# Patient Record
Sex: MALE | Race: WHITE | Employment: OTHER | URBAN - NONMETROPOLITAN AREA
[De-identification: names, ages, dates, MRNs, and addresses within clinical notes are randomized per-mention and may not be internally consistent; named-entity substitution may affect disease eponyms.]

---

## 2017-03-21 PROBLEM — I10 ESSENTIAL HYPERTENSION: Status: ACTIVE | Noted: 2017-03-21

## 2017-03-21 PROBLEM — Z79.01 CHRONIC ANTICOAGULATION: Status: ACTIVE | Noted: 2017-03-21

## 2018-08-02 PROBLEM — Z87.891 HISTORY OF TOBACCO ABUSE: Status: ACTIVE | Noted: 2018-08-02

## 2018-08-02 PROBLEM — G62.9 PERIPHERAL NEUROPATHY: Status: ACTIVE | Noted: 2018-08-02

## 2018-08-02 PROBLEM — G47.33 OSA (OBSTRUCTIVE SLEEP APNEA): Status: ACTIVE | Noted: 2018-08-02

## 2021-01-07 PROBLEM — Z86.0101 HISTORY OF ADENOMATOUS POLYP OF COLON: Status: ACTIVE | Noted: 2021-01-07

## 2022-01-27 PROBLEM — M25.562 CHRONIC PAIN OF LEFT KNEE: Status: ACTIVE | Noted: 2022-01-27

## 2022-01-27 PROBLEM — G89.29 CHRONIC PAIN OF LEFT KNEE: Status: ACTIVE | Noted: 2022-01-27

## 2022-01-27 PROBLEM — M25.462 EFFUSION OF LEFT KNEE JOINT: Status: ACTIVE | Noted: 2022-01-27

## 2022-05-20 PROBLEM — M17.12 PRIMARY OSTEOARTHRITIS OF LEFT KNEE: Status: ACTIVE | Noted: 2022-05-18

## 2022-08-05 PROBLEM — U07.1 COVID-19: Status: ACTIVE | Noted: 2022-08-05

## 2023-02-14 PROBLEM — I10 ESSENTIAL HYPERTENSION: Status: ACTIVE | Noted: 2023-02-14

## 2023-02-14 PROBLEM — R73.9 HYPERGLYCEMIA: Status: ACTIVE | Noted: 2023-02-14

## 2023-02-14 PROBLEM — Z79.01 ANTICOAGULATED ON COUMADIN: Status: ACTIVE | Noted: 2023-02-14

## 2023-03-08 ENCOUNTER — TELEPHONE (OUTPATIENT)
Dept: CARDIOLOGY CLINIC | Age: 83
End: 2023-03-08

## 2023-03-09 ENCOUNTER — OFFICE VISIT (OUTPATIENT)
Dept: CARDIOLOGY CLINIC | Age: 83
End: 2023-03-09
Payer: MEDICARE

## 2023-03-09 VITALS
HEIGHT: 71 IN | DIASTOLIC BLOOD PRESSURE: 82 MMHG | SYSTOLIC BLOOD PRESSURE: 128 MMHG | HEART RATE: 68 BPM | WEIGHT: 186 LBS | BODY MASS INDEX: 26.04 KG/M2

## 2023-03-09 DIAGNOSIS — G47.33 OSA (OBSTRUCTIVE SLEEP APNEA): ICD-10-CM

## 2023-03-09 DIAGNOSIS — I48.20 CHRONIC ATRIAL FIBRILLATION (HCC): Primary | ICD-10-CM

## 2023-03-09 DIAGNOSIS — I50.42 CHRONIC COMBINED SYSTOLIC AND DIASTOLIC HEART FAILURE (HCC): ICD-10-CM

## 2023-03-09 DIAGNOSIS — I35.1 MILD AORTIC INSUFFICIENCY: ICD-10-CM

## 2023-03-09 DIAGNOSIS — I05.1 RHEUMATIC MITRAL REGURGITATION: ICD-10-CM

## 2023-03-09 DIAGNOSIS — I10 PRIMARY HYPERTENSION: ICD-10-CM

## 2023-03-09 PROCEDURE — 3074F SYST BP LT 130 MM HG: CPT | Performed by: CLINICAL NURSE SPECIALIST

## 2023-03-09 PROCEDURE — 3079F DIAST BP 80-89 MM HG: CPT | Performed by: CLINICAL NURSE SPECIALIST

## 2023-03-09 PROCEDURE — 99213 OFFICE O/P EST LOW 20 MIN: CPT | Performed by: CLINICAL NURSE SPECIALIST

## 2023-03-09 PROCEDURE — 1123F ACP DISCUSS/DSCN MKR DOCD: CPT | Performed by: CLINICAL NURSE SPECIALIST

## 2023-03-09 ASSESSMENT — ENCOUNTER SYMPTOMS
VOMITING: 0
SHORTNESS OF BREATH: 1
CHEST TIGHTNESS: 0
ABDOMINAL PAIN: 0
COUGH: 0
NAUSEA: 0
FACIAL SWELLING: 0
WHEEZING: 0
EYE REDNESS: 0

## 2023-03-09 NOTE — PROGRESS NOTES
[facility], [address], [address]  [address]  Phone: [phone]  Fax: [phone]    OFFICE VISIT:  3/9/2023    Miguel Cutler - : 1940    Reason For Visit:  Cecily Rizzo is a 79 y.o. male who is here for 6 Month Follow-Up and Atrial Fibrillation       Diagnosis Orders   1. Chronic atrial fibrillation ([address]        2. Chronic combined systolic and diastolic heart failure ([address]        3. Primary hypertension        4. Rheumatic mitral regurgitation        5. Mild aortic insufficiency        6. SADE (obstructive sleep apnea)                HPI  Pt with a history of dyslipidemia, hypertension, sleep apnea, past tobacco abuse/COPD, chronic atrial fibrillation/Coumadin therapy, systolic heart failure, mitral regurgitation considered to be 2+ via DENA on 2022, and aortic insufficiency      He has some dyspnea that is chronic, but has recently been treated for pneumonia within the past few weeks and is still recovering. No complaints of chest pain, orthopnea, PND, edema, palpitations, or sudden weight gain       Aleida Olivas MD is PCP.   Miguel Cutler has the following history as recorded in [facility]:    Patient Active Problem List    Diagnosis Date Noted    COVID-19 2022    Postoperative anemia 2022    Primary osteoarthritis of left knee 2022    Chronic pain of left knee 2022    Osteoarthritis of left knee 2022    Tendinosis of left knee 2022    Effusion of left knee joint 2022    SADE (obstructive sleep apnea) 2018    Peripheral neuropathy 2018    PUD (peptic ulcer disease) 2018    BPH (benign prostatic hyperplasia) 2018    History of tobacco abuse 2018    Mild aortic insufficiency 2017    Mild mitral regurgitation by prior echocardiogram 2017    Essential hypertension 2017    Chronic anticoagulation 2017    Rheumatic fever     Chronic atrial fibrillation ([address] COPD (chronic obstructive pulmonary disease) (HCC)      Past Medical History:   Diagnosis Date    Arthritis     Bladder cancer ([facility]     BCG tx    Chronic atrial fibrillation (HCC)     Chronic cough     COPD (chronic obstructive pulmonary disease) (HCC)     asbestoes exposure    COVID-19 2022    Fatigue     Hearing deficit     Hyperlipidemia     Hypertension     MVP (mitral valve prolapse)     Osteoarthritis     Pneumonia     Prostatic hypertrophy     Rheumatic fever     Sleep apnea     CPAP    Tobacco use      Past Surgical History:   Procedure Laterality Date    APPENDECTOMY      BLADDER SURGERY      for cancer    MO EXC LESION EYELID W/O CLSR/W/SIMPLE DIR CLOSURE Bilateral 2018    BLEPHAROPLASTY BILATERAL performed by Yanelis Ernandez MD at [address] Left 2022    LEFT KNEE TOTAL ARTHROPLASTY performed by Sean Rosado MD at [facility]       Family History   Problem Relation Age of Onset    Heart Disease Mother      Social History     Tobacco Use    Smoking status: Former     Packs/day: 1.00     Years: 35.00     Pack years: 35.00     Types: Cigarettes     Quit date: 1985     Years since quittin.8    Smokeless tobacco: Never   Substance Use Topics    Alcohol use: Yes     Comment: 3-4 Beers a week      Current Outpatient Medications   Medication Sig Dispense Refill    fluticasone-salmeterol (ADVAIR DISKUS) 250-50 MCG/ACT AEPB diskus inhaler Inhale 1 puff into the lungs in the morning and 1 puff in the evening.  60 each 3    lisinopril (PRINIVIL;ZESTRIL) 10 MG tablet Take 1 tablet by mouth daily 90 tablet 3    ondansetron (ZOFRAN) 4 MG tablet Take 1 tablet by mouth every 8 hours as needed for Nausea or Vomiting 30 tablet 0    tamsulosin (FLOMAX) 0.4 MG capsule Take 0.4 mg by mouth 2 times daily       aspirin 81 MG EC tablet Take 81 mg by mouth daily      Apoaequorin (PREVAGEN EXTRA STRENGTH) 20 MG CAPS Take 20 mg by mouth daily gabapentin (NEURONTIN) 300 MG capsule Take 2 capsules by mouth nightly for 3 days. 6 capsule 0    esomeprazole (NEXIUM) 40 MG delayed release capsule TAKE 1 CAPSULE BY MOUTH EVERY MORNING BEFORE BREAKFAST (Patient taking differently: Take 40 mg by mouth every morning (before breakfast)) 90 capsule 1    warfarin (COUMADIN) 6 MG tablet Take 1 tablet by mouth daily (Patient taking differently: Take 6 mg by mouth daily EXCEPT ON WEDNESDAY'S, HE TAKES 1 1/2 TABLETS FOR A TOTAL OF 9 MG) 90 tablet 1    Misc Natural Products (OSTEO BI-FLEX JOINT SHIELD PO) Take 1 tablet by mouth daily       Multiple Vitamins-Minerals (CENTRUM SILVER) TABS Take 1 tablet by mouth daily       Ascorbic Acid (VITAMIN C) 500 MG tablet Take 500 mg by mouth daily. No current facility-administered medications for this visit. Allergies: Ciprofloxacin    Review of Systems  Review of Systems   Constitutional:  Negative for activity change, diaphoresis, fatigue, fever and unexpected weight change. HENT:  Negative for facial swelling and nosebleeds. Complains of hearing loss   Eyes:  Negative for redness and visual disturbance. Respiratory:  Positive for shortness of breath (Recent pneumonia). Negative for cough, chest tightness and wheezing. Cardiovascular:  Negative for chest pain, palpitations and leg swelling. Gastrointestinal:  Negative for abdominal pain, nausea and vomiting. Endocrine: Negative for cold intolerance and heat intolerance. Genitourinary:  Negative for dysuria and hematuria. Musculoskeletal:  Negative for arthralgias and myalgias. C/o  knee pain, improving since surgery   Skin:  Negative for pallor and rash. Neurological:  Negative for dizziness, seizures, syncope, weakness and light-headedness. Hematological:  Does not bruise/bleed easily. Psychiatric/Behavioral:  Negative for agitation. The patient is not nervous/anxious.       Objective  Vital Signs - /82   Pulse 68   Ht 5' 11\" (1.803 m)   Wt 186 lb (84.4 kg)   BMI 25.94 kg/m²   Physical Exam  Vitals and nursing note reviewed. Constitutional:       General: He is not in acute distress. Appearance: He is well-developed. He is not diaphoretic. HENT:      Head: Normocephalic and atraumatic. Right Ear: Hearing and external ear normal.      Left Ear: Hearing and external ear normal.      Ears:      Comments: Hard of hearing     Nose: Nose normal.   Eyes:      General:         Right eye: No discharge. Left eye: No discharge. Pupils: Pupils are equal, round, and reactive to light. Neck:      Thyroid: No thyromegaly. Vascular: No carotid bruit or JVD. Trachea: No tracheal deviation. Cardiovascular:      Rate and Rhythm: Normal rate. Rhythm irregular. Heart sounds: Murmur (2/6 systolic) heard. No friction rub. No gallop. Pulmonary:      Effort: Pulmonary effort is normal. No respiratory distress. Breath sounds: Rales (left base) present. No wheezing. Abdominal:      Palpations: Abdomen is soft. Tenderness: There is no abdominal tenderness. Musculoskeletal:         General: No swelling or deformity. Cervical back: Neck supple. No muscular tenderness. Right lower leg: No edema. Left lower leg: No edema. Comments: Abnormal gait due to knee pain   Skin:     General: Skin is warm and dry. Findings: No rash. Comments: Left knee incision well-healed without sign of infection   Neurological:      General: No focal deficit present. Mental Status: He is alert and oriented to person, place, and time. Cranial Nerves: No cranial nerve deficit.    Psychiatric:         Mood and Affect: Mood normal.         Behavior: Behavior normal.         Judgment: Judgment normal.       Data:  Lab Results   Component Value Date/Time    WBC 9.5 05/02/2022 01:40 PM    RBC 5.24 05/02/2022 01:40 PM    HGB 11.5 05/20/2022 02:53 AM    HCT 35.0 05/20/2022 02:53 AM     05/02/2022 01:40 PM      No results found for: CHOL, TRIG, HDL, LDLCALC  Lab Results   Component Value Date/Time     05/20/2022 02:53 AM    K 4.3 05/20/2022 02:53 AM     05/20/2022 02:53 AM    CO2 25 05/20/2022 02:53 AM    GLUCOSE 119 05/20/2022 02:53 AM    BUN 16 05/20/2022 02:53 AM    CREATININE 1.1 05/20/2022 02:53 AM    CALCIUM 8.5 05/20/2022 02:53 AM    ALT 25 05/02/2022 01:40 PM    AST 20 05/02/2022 01:40 PM     Lab Results   Component Value Date/Time    TSH 1.091 11/02/2011 09:07 AM     Transesophageal echocardiogram preliminary report 5/12/2022:     Mitral regurgitation appears mild to moderate (2+ at worst). Eccentric posteriorly directed jet with no reversal or blunting of flow in 3 pulmonary veins. EF 45 to 50% with mild to moderate left ventricular enlargement. Massive left atrial enlargement with no thrombus in LA or AROLDO. Intact interatrial septum. Moderate to severe right atrial enlargement. Mild RVE. Plan: Proceed with intermediate risk orthopedic surgery as clinically indicated. Can be monitored going forward. Medical management. Estee 3//30/22  Impression:   There is no obvious infarct or ischemia, with a calculated ejection   fraction of 51 % moderate left ventricular dilatation. Suggest: Clinical correlation is advised. Signed by Dr Bee Shoulder     Echo 3/30/22  Conclusions      Summary   LV is moderate to severely dilated with mild to moderately reduced LV   systolic function. LV ejection fraction estimated at 40 to 45%. Probable grade 3 diastolic dysfunction (rhythm is atrial fibrillation)   RV is mildly dilated with preserved RV systolic function. Massive left atrial enlargement. Moderate to severe right atrial enlargement. Aortic valve is trileaflet with mild leaflet thickening but preserved   leaflet mobility. No stenosis. Mild aortic regurgitation. Mitral valve is mildly thickened with mildly reduced leaflet mobility.    Moderate to severe eccentric posteriorly directed mitral regurgitation. No   stenosis.   Mild tricuspid regurgitation.   RVSP estimated at 30 mmHg.   No significant pericardial effusion.      Signature      ----------------------------------------------------------------   Electronically signed by Salvador Hemphill MD(Interpreting physician)   on 04/25/2022 10:33 PM   ----------------------------------------------------------------    Assessment:     Diagnosis Orders   1. Chronic atrial fibrillation (HCC)        2. Chronic combined systolic and diastolic heart failure (HCC)        3. Primary hypertension        4. Rheumatic mitral regurgitation        5. Mild aortic insufficiency        6. SADE (obstructive sleep apnea)               Chronic atrial fibrillation-rate controlled and anticoagulated with warfarin.  Patient does home INR monitoring, last INR 1.7 and coumadin adjusted.  Recent echo shows severe bilateral atrial enlargement.  No bleeding or falling issues    Chronic combined systolic and diastolic heart failure-EF 45 to 50% per recent DENA.  He appears euvolemic on current regimen with lisinopril.  Instructed to monitor weight, report sudden weight gain, low-sodium diet.  Currently not on a beta-blocker with heart rate of 68 in the office today.  Consideration for beta-blocker in the future.  Discussed monitoring weight, reporting sudden weight gain, low-sodium diet    Hypertension-stable on current regimen with lisinopril    Rheumatic mitral regurgitation- 2+ regurgitation at most per DENA.  Stable, continue to monitor    Aortic regurgitation-mild per recent echo, stable, continue to monitor    Obstructive sleep apnea-stable and wearing sleep equipment regularly    Plan    Return in about 6 months (around 9/9/2023) for APRN.     - Weigh daily every morning after urinating (this is your dry weight).   Report weight gain of 3lbs or more in 24hrs or 5lbs in one week.  - Call for increasing shortness of breath or increasing swelling in feet and  legs.    (This could mean you are retaining too much fluid)  - 2000mg low sodium diet  - Fluid restriction of 1500ml per day (about 6-8 cups (48-64oz) of fluid per day)     Call with any questionsor concerns  Follow up with Ana Milligan MD for non cardiac problems  Report any new problems  Cardiovascular Fitness-Exercise as tolerated. Strive for 15 minutes of exercise most days of the week. Cardiac / HealthyDiet  Continue current medications as directed  Continue plan of treatment  It is always recommended that you bring your medicationsbottles with you to each visit - this is for your safety!        Winsome Ames, CHRISTOPHER

## 2023-03-09 NOTE — PATIENT INSTRUCTIONS
Weigh daily every morning after urinating (this is your dry weight). Report weight gain of 3lbs or more in 24hrs or 5lbs in one week. - Call for increasing shortness of breath or increasing swelling in feet and legs.     (This could mean you are retaining too much fluid)  - 2000mg low sodium diet  - Fluid restriction of 1500ml per day (about 6-8 cups (48-64oz) of fluid per day)

## 2023-03-20 ENCOUNTER — TELEPHONE (OUTPATIENT)
Dept: CARDIOLOGY CLINIC | Age: 83
End: 2023-03-20

## 2023-03-20 NOTE — TELEPHONE ENCOUNTER
Please review patient's recent echo done at [facility] that shows a decrease in EF to 35 to 40%, previously 40 to 45% per TTE and 45 to 50% per T EE last year (to eval MR). Negative Estee scan last year    He is not symptomatic. He is not on a beta-blocker. We will get this restarted. Please advise if you recommend anything else with the decline in his LVEF.     Thank you

## 2023-03-21 NOTE — TELEPHONE ENCOUNTER
Please see Dr. Sheela Marks comments below.   Can you please get a CD with echo images from [facility] on this patient per Dr. Sheela Marks request.

## 2023-03-27 PROBLEM — G47.33 OBSTRUCTIVE SLEEP APNEA: Chronic | Status: ACTIVE | Noted: 2023-03-27

## 2023-03-27 PROBLEM — J96.11 CHRONIC RESPIRATORY FAILURE WITH HYPOXIA: Chronic | Status: ACTIVE | Noted: 2023-03-27

## 2023-03-27 PROBLEM — J96.11 CHRONIC RESPIRATORY FAILURE WITH HYPOXIA: Status: ACTIVE | Noted: 2023-03-27

## 2023-03-27 PROBLEM — R91.8 GROUND GLASS OPACITY PRESENT ON IMAGING OF LUNG: Status: ACTIVE | Noted: 2023-03-27

## 2023-03-27 PROBLEM — J47.9 BRONCHIECTASIS WITHOUT COMPLICATION: Status: ACTIVE | Noted: 2023-03-27

## 2023-03-27 PROBLEM — J47.9 BRONCHIECTASIS WITHOUT COMPLICATION: Chronic | Status: ACTIVE | Noted: 2023-03-27

## 2023-03-27 PROBLEM — J43.8 OTHER EMPHYSEMA: Status: ACTIVE | Noted: 2023-03-27

## 2023-03-27 PROBLEM — R09.02 HYPOXIA: Status: RESOLVED | Noted: 2023-02-11 | Resolved: 2023-03-27

## 2023-03-27 PROBLEM — J30.9 ALLERGIC RHINITIS: Status: ACTIVE | Noted: 2023-03-27

## 2023-03-27 PROBLEM — J30.9 ALLERGIC RHINITIS: Chronic | Status: ACTIVE | Noted: 2023-03-27

## 2023-03-27 PROBLEM — J43.8 OTHER EMPHYSEMA: Chronic | Status: ACTIVE | Noted: 2023-03-27

## 2023-03-27 PROBLEM — K21.9 GASTROESOPHAGEAL REFLUX DISEASE: Chronic | Status: ACTIVE | Noted: 2023-03-27

## 2023-03-27 PROBLEM — R59.9 REACTIVE LYMPHADENOPATHY: Status: ACTIVE | Noted: 2023-03-27

## 2023-03-27 PROBLEM — K21.9 GASTROESOPHAGEAL REFLUX DISEASE: Status: ACTIVE | Noted: 2023-03-27

## 2023-04-19 ENCOUNTER — TELEPHONE (OUTPATIENT)
Dept: CARDIOLOGY CLINIC | Age: 83
End: 2023-04-19

## 2023-04-19 NOTE — TELEPHONE ENCOUNTER
Patient missed his appointments to discuss echo findings and recommendations from Dr. Shaun Andersen. Dr Shaun Andersen recommended starting additional heart failure medications and to a further evaluate mitral valve with a DENA and possibly right left heart cath. I wanted to discuss this with the patient. Please have him either reschedule his appointment or make a telephone appointment. If he is going elsewhere for cardiology, that is fine, but want to be sure that this gets taken care of    .

## 2023-04-19 NOTE — TELEPHONE ENCOUNTER
Reached out to patient just to check and make sure everything is okay. He said that he feels his granddaughter is having him see another doctor but in unsure where. He is going to call us back and let us know if he is staying with this office or if he is switching offices.

## 2023-04-21 NOTE — TELEPHONE ENCOUNTER
Spoke with Giancarlo Trivedi patients granddaughter, she would like you to call her and discuss medication changes since patient gets very confused. He was under the intention that Dr. Maik Sandoval was retiring and they were going to proceed to [facility] if that was the issue. Her number is in the chart under other contact. Please let me know if I can help in any way.

## 2023-04-24 NOTE — PROGRESS NOTES
Spoke with patient's granddaughter, Roberto Morel who helps with his care. Discussed Dr. Gilford Hawk recommendations regarding patient's heart failure and mitral valve. We will start Entresto after a 36-hour washout and discontinue lisinopril. Follow-up in [address] [place] [address] office in a few weeks to see how he is doing.   Consideration for beta-blocker and spironolactone in future    Reyna-please see if we can work this patient in for follow-up in the next 3 to 5 weeks

## 2023-05-01 ENCOUNTER — CARE COORDINATION (OUTPATIENT)
Dept: CARE COORDINATION | Age: 83
End: 2023-05-01

## 2023-05-01 NOTE — CARE COORDINATION
ACC: Joshua Hernandez, RN    Ambulatory Care Nurse contacted the patient via telephone to perform admission intake assessment for ambulatory care management. ACM Verified name and  with family as identifiers. Provided introduction to self, and explanation of the ACM role. Patient reports he is moving providers to [address] [place] [address] were his Granddaughter is employed. ACM verified follow-up appointment on tomorrow at [address] [facility]. Patient requested assistance with canceling appointment on 23 with [facility] ([facility]). ACM verbalized understanding encouraging patient to reach out for future needs Patient verbalized understanding.        Berkley Hernández [address], [address] [place] [address]

## 2023-05-07 NOTE — PROGRESS NOTES
" Ilene Oliveros, MSN, APRN, NP-C, RONNIE, CFRN, EMT-B  [facility]  [address]            [place] [address]  Phone: [phone]  Fax: [phone]          Chief Complaint  Pneumonia    Subjective    History of Present Illness  Riley Foote presents to [facility]   History of Present Illness   The patient presents today for f/u of PNA and bronchiectasis. He is accompanied by his granddaughter.  The patient is using Trelegy, albuterol HFA/neb as needed.  The patient states that he is feeling much better since hospitalization in February.  He states that he played 18 holes of golf today.  No increasing shortness of breath, no fever, no chills, no cough with purulent sputum.    Studies from last OV 3/27/23:   CT of the chest is pending 6/12/23 to follow-up on pneumonia, groundglass opacities, and reactive lymphadenopathy.  3/27/23 alpha 1 MM  3/27/23 CBC + differential with noted metamyelocyte and thrombocytopenia, elevated IgM, decreased IgG subclass 2, CMP with mild hyperglycemia  3/27/23 IgA, IgE, IgG, IgG subclass 1, IgG subclass 3, IgG subclass 4 within normal limits  Compliance report was unable to be obtained as his PAP device is outdated.   Overnight 3/1/23 was reported to be completed on RA and showed 4.6 minutes of time less than 88%, 170 desaturations, AMBROSE 24. (the patient reports that he was wearing his PAP+2L when ON was completed).  No other aggravating or alleviating factors.      Objective   Vital Signs:   /72   Pulse 66   Ht 177.8 cm (70\")   Wt 83.5 kg (184 lb)   SpO2 97%   BMI 26.40 kg/m²     Physical Exam  Vitals reviewed.   Constitutional:       General: He is not in acute distress.     Appearance: He is well-developed and overweight.   HENT:      Head: Normocephalic and atraumatic.   Eyes:      General: No scleral icterus.     Conjunctiva/sclera: Conjunctivae normal.      Pupils: Pupils are equal, round, and " reactive to light.   Cardiovascular:      Rate and Rhythm: Normal rate.   Pulmonary:      Effort: Pulmonary effort is normal. No respiratory distress.      Breath sounds: Normal breath sounds. No wheezing or rales.   Musculoskeletal:         General: Normal range of motion.      Cervical back: Normal range of motion and neck supple.   Skin:     General: Skin is warm and dry.   Neurological:      Mental Status: He is alert and oriented to person, place, and time.   Psychiatric:         Behavior: Behavior normal.         Thought Content: Thought content normal.         Judgment: Judgment normal.        Result Review :  The following data was reviewed by: OLEG Bermeo on 05/08/2023:    Rest/Exercise Pulse Ox Values        3/27/2023    13:30   Rest/Exercise Pulse Ox Results   Rest room air SAT % 98   Exercise room air SAT % 98         Results for orders placed during the hospital encounter of 04/11/23    Pulmonary Function Test    [facility] - Pulmonary Function Test    [address] [facility]  [place]  [address]  [phone]    Patient : Riley Foote  MRN : [id number]  CSN : [id number]  Pulmonologist : Antoine Mayes MD  Date : 4/11/2023    ______________________________________________________________________    Interpretation :  1.  Spirometry is within normal limits and in fact midflows are supranormal.  2.  Lung volumes reveal a decrease in expiratory reserve volume with a coexisting increase in inspiratory capacity and otherwise are within normal limits.  3.  There is a mild diffusion impairment even when corrected for alveolar volume.      Antoine Mayes MD           Assessment and Plan  Diagnoses and all orders for this visit:    1. Pneumonia due to infectious organism, unspecified laterality, unspecified part of lung (Primary)  Overview:  CT chest 2/15/23 - Persistent patchy left lower lobe opacities with new scattered groundglass opacities with bilateral upper and  right lower lobes worrisome for multifocal pneumonia. Similar bronchiectasis with peribronchial thickening. Reactive appearing mediastinal and possibly left hilar lymph node nodes.    CXR 3/9/23 - Previously identified bibasilar pulmonary infiltrates (left greater than right) are much improved on today's exam. No new or worsening infiltrate.    Assessment & Plan:  3/27/23 CBC + differential with noted metamyelocyte and thrombocytopenia-obtain f/u CBC+diff    F/u CT chest pending     Orders:  -     CBC & Differential; Future    2. High total serum IgM  -     VICK,PE and FLC, Serum; Future    3. Chronic respiratory failure with hypoxia  Assessment & Plan:  Overnight 3/1/23 was reported to be completed on RA and showed 4.6 minutes of time less than 88%, 170 desaturations, AMBROSE 24. (the patient reports that he was wearing his PAP+2L when ON was completed).    The patient is in need of a new NIPPV as his is >5 years old. His granddaughter is concerned that he need a Trilogy device.     Obtain f/u ON pulse on current PAP and O2 liter flow. Obtain AM ABG.   Unable to obtain compliance 2' current machine is outdated.     Orders:  -     Overnight Sleep Oximetry Study; Future  -     Blood Gas, Arterial With Co-Ox; Future    4. Bronchiectasis without complication  Comments:  Continue bronchodilators  Overview:  CT chest 2/15/23 - Similar bronchiectasis with peribronchial thickening.    3/27/23 elevated IgM, decreased IgG subclass 2  3/27/23 IgA, IgE, IgG, IgG subclass 1, IgG subclass 3, IgG subclass 4 within normal limits      5. Ground glass opacity present on imaging of lung  Overview:  CT chest 2/15/23 - Persistent patchy left lower lobe opacities with new scattered groundglass opacities with bilateral upper and right lower lobes worrisome for multifocal pneumonia.    Assessment & Plan:  F/u CT chest pending       6. Obstructive sleep apnea  Assessment & Plan:  Overnight 3/1/23 was reported to be completed on RA and showed  4.6 minutes of time less than 88%, 170 desaturations, AMBROSE 24. (the patient reports that he was wearing his PAP+2L when ON was completed).    The patient is in need of a new NIPPV as his is >5 years old. His granddaughter is concerned that he need a Trilogy device.     Obtain f/u ON pulse on current PAP and O2 liter flow. Obtain AM ABG.   Unable to obtain compliance 2' current machine is outdated.         7. Gastroesophageal reflux disease, unspecified whether esophagitis present  Comments:  Continue current treatment regimen.  Overview:  Nexium       8. Reactive lymphadenopathy  Comments:  f/u CT pending   Overview:  CT chest 2/15/23 - Reactive appearing mediastinal and possibly left hilar lymph node nodes.      9. Other emphysema  Overview:  3/27/23 alpha 1 MM    Trelegy-100, albuterol HFA/neb    Assessment & Plan:  Continue current treatment regimen.        Orders:  -     albuterol sulfate  (90 Base) MCG/ACT inhaler; Inhale 2 puffs Every 4 (Four) Hours As Needed for Wheezing or Shortness of Air for up to 30 days.  Dispense: 18 g; Refill: 11      Follow Up  OLEG Bermeo  5/9/2023  16:00 CDT    Return for keep upcoming appt, CT.    Patient was given instructions and counseling regarding his condition or for health maintenance advice. Please see specific information pulled into the AVS if appropriate.     Please note that portions of this note were completed with a voice recognition program.

## 2023-05-08 ENCOUNTER — OFFICE VISIT (OUTPATIENT)
Dept: PULMONOLOGY | Facility: CLINIC | Age: 83
End: 2023-05-08
Payer: MEDICARE

## 2023-05-08 VITALS
OXYGEN SATURATION: 97 % | HEIGHT: 70 IN | WEIGHT: 184 LBS | DIASTOLIC BLOOD PRESSURE: 72 MMHG | BODY MASS INDEX: 26.34 KG/M2 | SYSTOLIC BLOOD PRESSURE: 124 MMHG | HEART RATE: 66 BPM

## 2023-05-08 DIAGNOSIS — R76.8 HIGH TOTAL SERUM IGM: ICD-10-CM

## 2023-05-08 DIAGNOSIS — J47.9 BRONCHIECTASIS WITHOUT COMPLICATION: Chronic | ICD-10-CM

## 2023-05-08 DIAGNOSIS — K21.9 GASTROESOPHAGEAL REFLUX DISEASE, UNSPECIFIED WHETHER ESOPHAGITIS PRESENT: Chronic | ICD-10-CM

## 2023-05-08 DIAGNOSIS — G47.33 OBSTRUCTIVE SLEEP APNEA: Chronic | ICD-10-CM

## 2023-05-08 DIAGNOSIS — R91.8 GROUND GLASS OPACITY PRESENT ON IMAGING OF LUNG: ICD-10-CM

## 2023-05-08 DIAGNOSIS — J43.8 OTHER EMPHYSEMA: Chronic | ICD-10-CM

## 2023-05-08 DIAGNOSIS — R59.9 REACTIVE LYMPHADENOPATHY: ICD-10-CM

## 2023-05-08 DIAGNOSIS — J96.11 CHRONIC RESPIRATORY FAILURE WITH HYPOXIA: ICD-10-CM

## 2023-05-08 DIAGNOSIS — J18.9 PNEUMONIA DUE TO INFECTIOUS ORGANISM, UNSPECIFIED LATERALITY, UNSPECIFIED PART OF LUNG: Primary | ICD-10-CM

## 2023-05-08 PROCEDURE — 1159F MED LIST DOCD IN RCRD: CPT | Performed by: NURSE PRACTITIONER

## 2023-05-08 PROCEDURE — 3078F DIAST BP <80 MM HG: CPT | Performed by: NURSE PRACTITIONER

## 2023-05-08 PROCEDURE — 1160F RVW MEDS BY RX/DR IN RCRD: CPT | Performed by: NURSE PRACTITIONER

## 2023-05-08 PROCEDURE — 3074F SYST BP LT 130 MM HG: CPT | Performed by: NURSE PRACTITIONER

## 2023-05-08 PROCEDURE — 99214 OFFICE O/P EST MOD 30 MIN: CPT | Performed by: NURSE PRACTITIONER

## 2023-05-08 RX ORDER — ALBUTEROL SULFATE 90 UG/1
2 AEROSOL, METERED RESPIRATORY (INHALATION) EVERY 4 HOURS PRN
Qty: 18 G | Refills: 11 | Status: SHIPPED | OUTPATIENT
Start: 2023-05-08 | End: 2023-06-07

## 2023-05-08 RX ORDER — ALBUTEROL SULFATE 2.5 MG/3ML
2.5 SOLUTION RESPIRATORY (INHALATION) EVERY 4 HOURS PRN
COMMUNITY

## 2023-05-09 NOTE — PATIENT INSTRUCTIONS
Bronchiectasis  Bronchiectasis is a condition in which the airways in the lungs (bronchi) are damaged and widened. The condition makes it hard for the lungs to get rid of mucus, and it causes mucus to gather in the bronchi. This condition often leads to lung infections, which can make the condition worse.  What are the causes?  You can be born with this condition, or you can develop it later in life. Common causes of this condition include:  Cystic fibrosis.  Repeated lung infections, such as pneumonia or tuberculosis.  An object or other blockage in the lungs.  Breathing in fluid, food, or other objects (aspiration).  A problem with the body's defense system (immune system) and lung structure that is present at birth (congenital).  Sometimes the cause is not known.  What are the signs or symptoms?  Common symptoms of this condition include:  A daily cough that brings up mucus and lasts for more than 3 weeks.  Lung infections that happen often.  Shortness of breath and wheezing.  Weakness and feeling tired (fatigue).  How is this diagnosed?  This condition is diagnosed with tests, such as:  Chest X-rays or CT scans. These are done to check for changes in the lungs.  Breathing tests. These are done to check how well your lungs are working.  A test of a sample of your saliva (sputum culture). This test is done to check for infection.  Blood tests and other tests. These are done to check for related diseases or causes.  How is this treated?  Treatment for this condition depends on the severity of the illness and its cause. Treatment may include:  Medicines that loosen mucus so it can be coughed up (mucolytics).  Medicines that relax the muscles of the bronchi (bronchodilators).  Antibiotic medicines to prevent or treat infection.  Physical therapy to help clear mucus from the lungs. Techniques may include:  Postural drainage. This is when you sit or lie in certain positions so that mucus can drain by gravity.  Chest  percussion. This involves tapping the chest or back with a cupped hand.  Chest vibration. For this therapy, a hand or special equipment vibrates your chest and back.  Surgery to remove the affected part of the lung. This may be done in severe cases.  Follow these instructions at home:  Medicines  Take over-the-counter and prescription medicines only as told by your health care provider.  If you were prescribed an antibiotic medicine, take it as told by your health care provider. Do not stop taking the antibiotic even if you start to feel better.  Avoid taking sedatives and antihistamines unless your health care provider tells you to take them. These medicines tend to thicken the mucus in the lungs.  Managing symptoms  Do breathing exercises or techniques to clear your lungs as told by your health care provider.  Consider using a cold steam vaporizer or humidifier in your room or home to help loosen secretions.  If you have a cough that gets worse at night, try sleeping in a semi-upright position.  General instructions  Get plenty of rest.  Drink enough fluid to keep your urine pale yellow.  Stay inside when pollution and ozone levels are high.  Stay up to date with vaccinations and immunizations.  Avoid cigarette smoke and other lung irritants.  Do not use any products that contain nicotine or tobacco. These products include cigarettes, chewing tobacco, and vaping devices, such as e-cigarettes. If you need help quitting, ask your health care provider.  Keep all follow-up visits. This is important.  Contact a health care provider if:  You cough up more sputum than before and the sputum is yellow or green in color.  You have a fever or chills.  You cannot control your cough and are losing sleep.  Get help right away if:  You cough up blood.  You have chest pain.  You have increasing shortness of breath.  You have pain that gets worse or is not controlled with medicines.  You have a fever and your symptoms suddenly get  worse.  These symptoms may be an emergency. Get help right away. Call 911.  Do not wait to see if the symptoms will go away.  Do not drive yourself to the hospital.  Summary  Bronchiectasis is a condition in which the airways in the lungs (bronchi) are damaged and widened. The condition makes it hard for the lungs to get rid of mucus, and it causes mucus to gather in the bronchi.  Treatment usually includes therapy to help clear mucus from the lungs.  Avoid cigarette smoke and other lung irritants.  Stay up to date with vaccinations and immunizations.  This information is not intended to replace advice given to you by your health care provider. Make sure you discuss any questions you have with your health care provider.  Document Revised: 08/31/2022 Document Reviewed: 07/19/2022  [employer] Patient Education © 2022 [employer]  Gastroesophageal Reflux Disease, Adult  Gastroesophageal reflux (JULIA) happens when acid from the stomach flows up into the tube that connects the mouth and the stomach (esophagus). Normally, food travels down the esophagus and stays in the stomach to be digested. With JULIA, food and stomach acid sometimes move back up into the esophagus.  You may have a disease called gastroesophageal reflux disease (GERD) if the reflux:  Happens often.  Causes frequent or very bad symptoms.  Causes problems such as damage to the esophagus.  When this happens, the esophagus becomes sore and swollen. Over time, GERD can make small holes (ulcers) in the lining of the esophagus.  What are the causes?  This condition is caused by a problem with the muscle between the esophagus and the stomach. When this muscle is weak or not normal, it does not close properly to keep food and acid from coming back up from the stomach.  The muscle can be weak because of:  Tobacco use.  Pregnancy.  Having a certain type of hernia (hiatal hernia).  Alcohol use.  Certain foods and drinks, such as coffee, chocolate, onions, and  peppermint.  What increases the risk?  Being overweight.  Having a disease that affects your connective tissue.  Taking NSAIDs, such a ibuprofen.  What are the signs or symptoms?  Heartburn.  Difficult or painful swallowing.  The feeling of having a lump in the throat.  A bitter taste in the mouth.  Bad breath.  Having a lot of saliva.  Having an upset or bloated stomach.  Burping.  Chest pain. Different conditions can cause chest pain. Make sure you see your doctor if you have chest pain.  Shortness of breath or wheezing.  A long-term cough or a cough at night.  Wearing away of the surface of teeth (tooth enamel).  Weight loss.  How is this treated?  Making changes to your diet.  Taking medicine.  Having surgery.  Treatment will depend on how bad your symptoms are.  Follow these instructions at home:  Eating and drinking    Follow a diet as told by your doctor. You may need to avoid foods and drinks such as:  Coffee and tea, with or without caffeine.  Drinks that contain alcohol.  Energy drinks and sports drinks.  Bubbly (carbonated) drinks or sodas.  Chocolate and cocoa.  Peppermint and mint flavorings.  Garlic and onions.  Horseradish.  Spicy and acidic foods. These include peppers, chili powder, moore powder, vinegar, hot sauces, and BBQ sauce.  Citrus fruit juices and citrus fruits, such as oranges, mary, and limes.  Tomato-based foods. These include red sauce, chili, salsa, and pizza with red sauce.  Fried and fatty foods. These include donuts, french fries, potato chips, and high-fat dressings.  High-fat meats. These include hot dogs, rib eye steak, sausage, ham, and yang.  High-fat dairy items, such as whole milk, butter, and cream cheese.  Eat small meals often. Avoid eating large meals.  Avoid drinking large amounts of liquid with your meals.  Avoid eating meals during the 2-3 hours before bedtime.  Avoid lying down right after you eat.  Do not exercise right after you eat.  Lifestyle    Do not smoke or  use any products that contain nicotine or tobacco. If you need help quitting, ask your doctor.  Try to lower your stress. If you need help doing this, ask your doctor.  If you are overweight, lose an amount of weight that is healthy for you. Ask your doctor about a safe weight loss goal.  General instructions  Pay attention to any changes in your symptoms.  Take over-the-counter and prescription medicines only as told by your doctor.  Do not take aspirin, ibuprofen, or other NSAIDs unless your doctor says it is okay.  Wear loose clothes. Do not wear anything tight around your waist.  Raise (elevate) the head of your bed about 6 inches (15 cm). You may need to use a wedge to do this.  Avoid bending over if this makes your symptoms worse.  Keep all follow-up visits.  Contact a doctor if:  You have new symptoms.  You lose weight and you do not know why.  You have trouble swallowing or it hurts to swallow.  You have wheezing or a cough that keeps happening.  You have a hoarse voice.  Your symptoms do not get better with treatment.  Get help right away if:  You have sudden pain in your arms, neck, jaw, teeth, or back.  You suddenly feel sweaty, dizzy, or light-headed.  You have chest pain or shortness of breath.  You vomit and the vomit is green, yellow, or black, or it looks like blood or coffee grounds.  You faint.  Your poop (stool) is red, bloody, or black.  You cannot swallow, drink, or eat.  These symptoms may represent a serious problem that is an emergency. Do not wait to see if the symptoms will go away. Get medical help right away. Call your local emergency services (911 in the [place] Do not drive yourself to the hospital.  Summary  If a person has gastroesophageal reflux disease (GERD), food and stomach acid move back up into the esophagus and cause symptoms or problems such as damage to the esophagus.  Treatment will depend on how bad your symptoms are.  Follow a diet as told by your doctor.  Take all  medicines only as told by your doctor.  This information is not intended to replace advice given to you by your health care provider. Make sure you discuss any questions you have with your health care provider.  Document Revised: 06/28/2021 Document Reviewed: 06/28/2021  [employer] Patient Education © 2022 [employer]     No

## 2023-05-09 NOTE — ASSESSMENT & PLAN NOTE
Overnight 3/1/23 was reported to be completed on RA and showed 4.6 minutes of time less than 88%, 170 desaturations, AMBROSE 24. (the patient reports that he was wearing his PAP+2L when ON was completed).    The patient is in need of a new NIPPV as his is >5 years old. His granddaughter is concerned that he need a Trilogy device.     Obtain f/u ON pulse on current PAP and O2 liter flow. Obtain AM ABG.   Unable to obtain compliance 2' current machine is outdated.

## 2023-05-09 NOTE — ASSESSMENT & PLAN NOTE
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

## 2023-05-12 ENCOUNTER — TRANSCRIBE ORDERS (OUTPATIENT)
Dept: ADMINISTRATIVE | Facility: HOSPITAL | Age: 83
End: 2023-05-12
Payer: MEDICARE

## 2023-05-12 ENCOUNTER — LAB (OUTPATIENT)
Dept: LAB | Facility: HOSPITAL | Age: 83
End: 2023-05-12
Payer: MEDICARE

## 2023-05-12 DIAGNOSIS — J18.9 PNEUMONIA DUE TO INFECTIOUS ORGANISM, UNSPECIFIED LATERALITY, UNSPECIFIED PART OF LUNG: ICD-10-CM

## 2023-05-12 DIAGNOSIS — R76.8 HIGH TOTAL SERUM IGM: ICD-10-CM

## 2023-05-12 DIAGNOSIS — R76.8 HIGH TOTAL SERUM IGM: Primary | ICD-10-CM

## 2023-05-12 DIAGNOSIS — J96.11 CHRONIC RESPIRATORY FAILURE WITH HYPOXIA: ICD-10-CM

## 2023-05-12 LAB
A-A DO2: 38.1 MMHG
ARTERIAL PATENCY WRIST A: ABNORMAL
ATMOSPHERIC PRESS: 750 MMHG
BASE EXCESS BLDA CALC-SCNC: 3 MMOL/L (ref 0–2)
BASOPHILS # BLD AUTO: 0.06 10*3/MM3 (ref 0–0.2)
BASOPHILS NFR BLD AUTO: 0.8 % (ref 0–1.5)
BDY SITE: ABNORMAL
BODY TEMPERATURE: 37 C
COHGB MFR BLD: 1.7 % (ref 0–5)
DEPRECATED RDW RBC AUTO: 44.3 FL (ref 37–54)
EOSINOPHIL # BLD AUTO: 0.28 10*3/MM3 (ref 0–0.4)
EOSINOPHIL NFR BLD AUTO: 3.6 % (ref 0.3–6.2)
ERYTHROCYTE [DISTWIDTH] IN BLOOD BY AUTOMATED COUNT: 12.9 % (ref 12.3–15.4)
HCO3 BLDA-SCNC: 27.2 MMOL/L (ref 20–26)
HCT VFR BLD AUTO: 45.6 % (ref 37.5–51)
HCT VFR BLD CALC: 43.4 % (ref 38–51)
HGB BLD-MCNC: 14.3 G/DL (ref 13–17.7)
HGB BLDA-MCNC: 14.1 G/DL (ref 14–18)
IMM GRANULOCYTES # BLD AUTO: 0.05 10*3/MM3 (ref 0–0.05)
IMM GRANULOCYTES NFR BLD AUTO: 0.6 % (ref 0–0.5)
LYMPHOCYTES # BLD AUTO: 1.32 10*3/MM3 (ref 0.7–3.1)
LYMPHOCYTES NFR BLD AUTO: 16.8 % (ref 19.6–45.3)
Lab: ABNORMAL
MCH RBC QN AUTO: 29.4 PG (ref 26.6–33)
MCHC RBC AUTO-ENTMCNC: 31.4 G/DL (ref 31.5–35.7)
MCV RBC AUTO: 93.6 FL (ref 79–97)
METHGB BLD QL: 0.5 % (ref 0–3)
MODALITY: ABNORMAL
MONOCYTES # BLD AUTO: 0.74 10*3/MM3 (ref 0.1–0.9)
MONOCYTES NFR BLD AUTO: 9.4 % (ref 5–12)
NEUTROPHILS NFR BLD AUTO: 5.41 10*3/MM3 (ref 1.7–7)
NEUTROPHILS NFR BLD AUTO: 68.8 % (ref 42.7–76)
NOTE: ABNORMAL
NRBC BLD AUTO-RTO: 0 /100 WBC (ref 0–0.2)
OXYHGB MFR BLDV: 92.6 % (ref 94–99)
PCO2 BLDA: 39.6 MM HG (ref 35–45)
PCO2 TEMP ADJ BLD: 39.6 MM HG (ref 35–45)
PH BLDA: 7.45 PH UNITS (ref 7.35–7.45)
PH, TEMP CORRECTED: 7.45 PH UNITS (ref 7.35–7.45)
PLATELET # BLD AUTO: 171 10*3/MM3 (ref 140–450)
PMV BLD AUTO: 9.8 FL (ref 6–12)
PO2 BLDA: 65 MM HG (ref 83–108)
PO2 TEMP ADJ BLD: 65 MM HG (ref 83–108)
POTASSIUM BLDA-SCNC: 4 MMOL/L (ref 3.5–5.2)
RBC # BLD AUTO: 4.87 10*6/MM3 (ref 4.14–5.8)
SAO2 % BLDCOA: 94.7 % (ref 94–99)
SODIUM BLDA-SCNC: 141 MMOL/L (ref 136–145)
VENTILATOR MODE: ABNORMAL
WBC NRBC COR # BLD: 7.86 10*3/MM3 (ref 3.4–10.8)

## 2023-05-12 PROCEDURE — 82784 ASSAY IGA/IGD/IGG/IGM EACH: CPT

## 2023-05-12 PROCEDURE — 83050 HGB METHEMOGLOBIN QUAN: CPT | Performed by: NURSE PRACTITIONER

## 2023-05-12 PROCEDURE — 36415 COLL VENOUS BLD VENIPUNCTURE: CPT

## 2023-05-12 PROCEDURE — 82375 ASSAY CARBOXYHB QUANT: CPT | Performed by: NURSE PRACTITIONER

## 2023-05-12 PROCEDURE — 83521 IG LIGHT CHAINS FREE EACH: CPT

## 2023-05-12 PROCEDURE — 84165 PROTEIN E-PHORESIS SERUM: CPT

## 2023-05-12 PROCEDURE — 82805 BLOOD GASES W/O2 SATURATION: CPT | Performed by: NURSE PRACTITIONER

## 2023-05-12 PROCEDURE — 86334 IMMUNOFIX E-PHORESIS SERUM: CPT

## 2023-05-12 PROCEDURE — 85025 COMPLETE CBC W/AUTO DIFF WBC: CPT

## 2023-05-12 PROCEDURE — 36600 WITHDRAWAL OF ARTERIAL BLOOD: CPT | Performed by: NURSE PRACTITIONER

## 2023-05-12 PROCEDURE — 84155 ASSAY OF PROTEIN SERUM: CPT

## 2023-05-15 DIAGNOSIS — J96.11 CHRONIC RESPIRATORY FAILURE WITH HYPOXIA: ICD-10-CM

## 2023-05-15 DIAGNOSIS — G47.33 OBSTRUCTIVE SLEEP APNEA: Primary | ICD-10-CM

## 2023-05-15 LAB
ALBUMIN SERPL ELPH-MCNC: 3.6 G/DL (ref 2.9–4.4)
ALBUMIN/GLOB SERPL: 1.1 {RATIO} (ref 0.7–1.7)
ALPHA1 GLOB SERPL ELPH-MCNC: 0.3 G/DL (ref 0–0.4)
ALPHA2 GLOB SERPL ELPH-MCNC: 0.8 G/DL (ref 0.4–1)
B-GLOBULIN SERPL ELPH-MCNC: 1.1 G/DL (ref 0.7–1.3)
GAMMA GLOB SERPL ELPH-MCNC: 1.3 G/DL (ref 0.4–1.8)
GLOBULIN SER-MCNC: 3.5 G/DL (ref 2.2–3.9)
IGA SERPL-MCNC: 330 MG/DL (ref 61–437)
IGG SERPL-MCNC: 1398 MG/DL (ref 603–1613)
IGM SERPL-MCNC: 140 MG/DL (ref 15–143)
INTERPRETATION SERPL IEP-IMP: ABNORMAL
KAPPA LC FREE SER-MCNC: 34.4 MG/L (ref 3.3–19.4)
KAPPA LC FREE/LAMBDA FREE SER: 1.14 {RATIO} (ref 0.26–1.65)
LABORATORY COMMENT REPORT: ABNORMAL
LAMBDA LC FREE SERPL-MCNC: 30.1 MG/L (ref 5.7–26.3)
M PROTEIN SERPL ELPH-MCNC: ABNORMAL G/DL
PROT SERPL-MCNC: 7.1 G/DL (ref 6–8.5)

## 2023-05-15 NOTE — PROGRESS NOTES
Test results discussed with patient.  Patient voiced understanding.  He would like to proceed with new device at [facility].

## 2023-05-25 ENCOUNTER — LAB (OUTPATIENT)
Dept: LAB | Facility: HOSPITAL | Age: 83
End: 2023-05-25
Payer: MEDICARE

## 2023-05-25 ENCOUNTER — OFFICE VISIT (OUTPATIENT)
Dept: CARDIOLOGY | Facility: CLINIC | Age: 83
End: 2023-05-25
Payer: MEDICARE

## 2023-05-25 VITALS
HEIGHT: 70 IN | OXYGEN SATURATION: 98 % | SYSTOLIC BLOOD PRESSURE: 132 MMHG | HEART RATE: 61 BPM | BODY MASS INDEX: 26.2 KG/M2 | DIASTOLIC BLOOD PRESSURE: 80 MMHG | WEIGHT: 183 LBS

## 2023-05-25 DIAGNOSIS — I34.0 NONRHEUMATIC MITRAL VALVE REGURGITATION: ICD-10-CM

## 2023-05-25 DIAGNOSIS — I10 ESSENTIAL HYPERTENSION: ICD-10-CM

## 2023-05-25 DIAGNOSIS — I48.21 PERMANENT ATRIAL FIBRILLATION: ICD-10-CM

## 2023-05-25 DIAGNOSIS — I50.42 CHRONIC COMBINED SYSTOLIC AND DIASTOLIC CHF (CONGESTIVE HEART FAILURE): ICD-10-CM

## 2023-05-25 DIAGNOSIS — Z79.01 CHRONIC ANTICOAGULATION: ICD-10-CM

## 2023-05-25 DIAGNOSIS — I50.42 CHRONIC COMBINED SYSTOLIC AND DIASTOLIC CHF (CONGESTIVE HEART FAILURE): Primary | ICD-10-CM

## 2023-05-25 PROBLEM — I48.91 ATRIAL FIBRILLATION: Chronic | Status: ACTIVE | Noted: 2020-06-18

## 2023-05-25 PROBLEM — I48.91 ATRIAL FIBRILLATION: Status: ACTIVE | Noted: 2020-06-18

## 2023-05-25 LAB
ALBUMIN SERPL-MCNC: 4.3 G/DL (ref 3.5–5.2)
ALBUMIN/GLOB SERPL: 1.3 G/DL
ALP SERPL-CCNC: 79 U/L (ref 39–117)
ALT SERPL W P-5'-P-CCNC: 23 U/L (ref 1–41)
ANION GAP SERPL CALCULATED.3IONS-SCNC: 10 MMOL/L (ref 5–15)
AST SERPL-CCNC: 23 U/L (ref 1–40)
BILIRUB SERPL-MCNC: 0.4 MG/DL (ref 0–1.2)
BUN SERPL-MCNC: 18 MG/DL (ref 8–23)
BUN/CREAT SERPL: 20.2 (ref 7–25)
CALCIUM SPEC-SCNC: 9.4 MG/DL (ref 8.6–10.5)
CHLORIDE SERPL-SCNC: 103 MMOL/L (ref 98–107)
CO2 SERPL-SCNC: 26 MMOL/L (ref 22–29)
CREAT SERPL-MCNC: 0.89 MG/DL (ref 0.76–1.27)
EGFRCR SERPLBLD CKD-EPI 2021: 85 ML/MIN/1.73
GLOBULIN UR ELPH-MCNC: 3.4 GM/DL
GLUCOSE SERPL-MCNC: 106 MG/DL (ref 65–99)
NT-PROBNP SERPL-MCNC: 1048 PG/ML (ref 0–1800)
POTASSIUM SERPL-SCNC: 4.1 MMOL/L (ref 3.5–5.2)
PROT SERPL-MCNC: 7.7 G/DL (ref 6–8.5)
SODIUM SERPL-SCNC: 139 MMOL/L (ref 136–145)
TSH SERPL DL<=0.05 MIU/L-ACNC: 1.76 UIU/ML (ref 0.27–4.2)

## 2023-05-25 PROCEDURE — 36415 COLL VENOUS BLD VENIPUNCTURE: CPT

## 2023-05-25 PROCEDURE — 83880 ASSAY OF NATRIURETIC PEPTIDE: CPT

## 2023-05-25 PROCEDURE — 84443 ASSAY THYROID STIM HORMONE: CPT

## 2023-05-25 PROCEDURE — 80053 COMPREHEN METABOLIC PANEL: CPT

## 2023-05-25 RX ORDER — ASPIRIN 81 MG/1
81 TABLET, CHEWABLE ORAL DAILY
COMMUNITY

## 2023-05-25 RX ORDER — AZELASTINE 1 MG/ML
2 SPRAY, METERED NASAL 2 TIMES DAILY
COMMUNITY

## 2023-05-25 RX ORDER — LANOLIN ALCOHOL/MO/W.PET/CERES
1000 CREAM (GRAM) TOPICAL DAILY
COMMUNITY

## 2023-05-25 RX ORDER — SACUBITRIL AND VALSARTAN 24; 26 MG/1; MG/1
1 TABLET, FILM COATED ORAL 2 TIMES DAILY
Qty: 28 TABLET | Refills: 0 | COMMUNITY
Start: 2023-05-25

## 2023-05-25 RX ORDER — FLUTICASONE PROPIONATE 50 MCG
2 SPRAY, SUSPENSION (ML) NASAL DAILY
COMMUNITY

## 2023-05-25 RX ORDER — SACUBITRIL AND VALSARTAN 24; 26 MG/1; MG/1
1 TABLET, FILM COATED ORAL 2 TIMES DAILY
Qty: 60 TABLET | Refills: 11 | Status: SHIPPED | OUTPATIENT
Start: 2023-05-25

## 2023-05-25 RX ORDER — GUAIFENESIN 600 MG/1
1200 TABLET, EXTENDED RELEASE ORAL 2 TIMES DAILY
COMMUNITY

## 2023-05-25 NOTE — PROGRESS NOTES
[facility] - CARDIOLOGY  New Patient Initial Outpatient Evaluation    Primary Care Physician: Cirilo Sun MD    Subjective     Chief Complaint/Reason for Referral: Transferring care from [facility] for several cardiac issues    History of Present Illness    Mr. Foote is an 82-year-old male who has previously been followed at [facility] for what they have labeled as chronic atrial fibrillation as well as mitral regurgitation. I have reviewed available records from [facility] including what appears to be the last office visit he had in that practice was 03/09/2023 with OLEG Parada. She mentions that he has also had chronic systolic, and diastolic heart failure, and a MAURISIO that was done to evaluate mitral regurgitation in 2022 prior to an orthopedic surgery showed what was reported as being mitral regurgitation that was mild to moderate at worse, though they then commented on it being an eccentric posteriorly directed jet. The EF was reported at 45 to 50%. At that time, they also noted moderate to severe right atrial enlargement, and mild right ventricular enlargement with an intact interatrial septum. Two months prior to that, a Lexiscan was done that was interpreted as no obvious infarct or ischemia with calculated EF of 51%. Most recently, he had another echocardiogram performed here on 03/10/2023. Also of note, he had been admitted to our facility briefly from 02/11/2023 until 02/16/2023 with discharge diagnoses including hypoxia, and pneumonia. Records indicate that symptoms of shortness of breath were occurring with very minimal exertion despite outpatient treatment with steroids, and nebulizers, and antibiotics. He was treated in the hospital with IV antibiotics, steroids, and breathing treatments, and sent home on a prolonged steroid taper.    Mr. Foote presents to the clinic today accompanied by his daughter who contributes to his history. He is doing well. He experiences shortness  of breath when he exerts himself, such as picking up sticks or doing something out in the yard. He reports that it is not more noticeable or bothersome to him now than it was last year. He is having phlegm come out. He has bronchiectasis. He reports that he is active now, and he is getting some exercise, and he is playing golf. He does not feel like his breathing is limited for him. He reports that he has a CPAP, but he does not have any trouble breathing. He does lie flat without the CPAP on. He reports that he does snore a little bit with that. He will stop, and then he will catch his breath while he is sleeping.    He experiences a sharp pain in his chest. He explains that he will sit up, and it goes away in 2 to 3 minutes. He describes that the pain is a sharp pressure. He reports that it does not happen when he is up moving out, playing golf, or working the yard. He notes that it wakes him up at night. He had a Zio patch done in 2020 because he was having some palpitations at nighttime. He has seen the electrophysiologist, Dr. Win. He reports that he was having some little episodes of it, and he was told that they did not think that they needed to do anything with the regimen. He was started on metoprolol, but it had dropped his heart rate down too low, so they discontinued it. He reports that he has not been seen by him since 2020.    He has atrial fibrillation, and mitral valve prolapse. He has had a heart murmur since he was 11 years old. He reports that he had rheumatic fever, and this atrial fibrillation has just come on in the last 10 years or so. He denies being told that he has congestive heart failure. He denies ever being on Lasix. He was told that his heart muscle looks weak a while back, but he did not. He has never had a heart catheterization. He is still on warfarin. He notices whenever it starts going down, he can take half a pill. He takes 6 mg the rest of the week, and on Wednesday he  "takes 9 mg, and whenever they do his blood reading on Thursday, he will turn around and take a half on Friday or Saturday.    He reports that he has not had lisinopril in a while. His daughter notes they were going to switch him over from the Entresto to the lisinopril, but when he found out that they were coming over here, he would not. He is effectively on nothing right now. He has become  \"too slow\" on beta blockers in the past, and down in the 40s, even lower than that.    He has rheumatic fever problems about 11 or 12 years old, and he was told that is what caused the heart murmur. 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 He reports that he did not want to be put on a pacemaker at that time because that is what they wanted to put a pacemaker in. He was put on Lanoxin, and he was on that for several years, and it took him off.      Review of Systems   Constitutional: Negative for malaise/fatigue.   Cardiovascular: Positive for dyspnea on exertion. Negative for chest pain, claudication, leg swelling, near-syncope, orthopnea, palpitations, paroxysmal nocturnal dyspnea and syncope.   Respiratory: Positive for cough. Negative for shortness of breath.    Hematologic/Lymphatic: Does not bruise/bleed easily.        Otherwise complete ROS reviewed and negative except as mentioned in the HPI.      Past Medical History:   Past Medical History:   Diagnosis Date    Anxiety     Arrhythmia     Arthritis     Atrial fibrillation     Bladder cancer     BPH (benign prostatic hyperplasia)     CHF (congestive heart failure)     COPD (chronic obstructive pulmonary disease)     COVID-19     GERD (gastroesophageal reflux disease)     Heart murmur     HLD (hyperlipidemia)     Hypertension     Mild aortic insufficiency     Mild mitral regurgitation by prior echocardiogram     Mitral " valve prolapse     Multinodular non-toxic goiter 12/15/2016    Peptic ulcer     Peripheral neuropathy     Pneumonia     Rheumatic fever     Sleep apnea     cpap at hs       Past Surgical History:  Past Surgical History:   Procedure Laterality Date    APPENDECTOMY      BLADDER SURGERY      COLONOSCOPY  01/26/2016    hyperplastic polyp, diverticulosis    COLONOSCOPY N/A 02/01/2021    Procedure: COLONOSCOPY WITH ANESTHESIA;  Surgeon: Frederick Adamson MD;  Location: [facility] ENDOSCOPY;  Service: Gastroenterology;  Laterality: N/A;  preop; hx of polyps  postop; diverticulosis  PCP Stefano Sun     TONSILLECTOMY      TOTAL KNEE ARTHROPLASTY Left     VASECTOMY         Family History: family history includes Heart disease in his mother; No Known Problems in his father.    Social History:  reports that he quit smoking about 41 years ago. His smoking use included cigarettes. He has a 15.00 pack-year smoking history. He has never used smokeless tobacco. He reports current alcohol use. He reports that he does not currently use drugs.    Medications:  Prior to Admission medications    Medication Sig Start Date End Date Taking? Authorizing Provider   albuterol (PROVENTIL) (2.5 MG/3ML) 0.083% nebulizer solution Take 2.5 mg by nebulization Every 4 (Four) Hours As Needed for Wheezing.   Yes Thalia Garner MD   albuterol sulfate  (90 Base) MCG/ACT inhaler Inhale 2 puffs Every 4 (Four) Hours As Needed for Wheezing or Shortness of Air for up to 30 days. 5/8/23 6/7/23 Yes DomoIlene APRN   Apoaequorin (PREVAGEN PO) Take  by mouth.   Yes Thalia Garner MD   aspirin 81 MG chewable tablet Chew 1 tablet Daily.   Yes Thalia Garner MD   azelastine (ASTELIN) 0.1 % nasal spray 2 sprays into the nostril(s) as directed by provider 2 (Two) Times a Day. Use in each nostril as directed   Yes Thalia Garner MD   esomeprazole (nexIUM) 40 MG capsule Take 1 capsule by mouth Every Morning Before Breakfast.    Yes Thalia Garner MD   FLUoxetine (PROzac) 20 MG capsule Take 1 capsule by mouth Daily.   Yes Thalia Garner MD   fluticasone (FLONASE) 50 MCG/ACT nasal spray 2 sprays into the nostril(s) as directed by provider Daily.   Yes Thalia Garner MD   Fluticasone-Umeclidin-Vilant (Trelegy Ellipta) 200-62.5-25 MCG/ACT aerosol powder  1 puff Daily.   Yes Thalia Garner MD   gabapentin (NEURONTIN) 300 MG capsule Take 2 capsules by mouth Daily. At bedtime   Yes Thalia Garner MD   guaiFENesin (MUCINEX) 600 MG 12 hr tablet Take 2 tablets by mouth 2 (Two) Times a Day.   Yes Thalia Garner MD   Misc Natural Products (OSTEO BI-FLEX ADV JOINT SHIELD PO) Take 1 tablet by mouth Daily.   Yes Thalia Garner MD   Multiple Vitamins-Minerals (CENTRUM SILVER) tablet 1 tablet Daily.   Yes Thalia Garner MD   NON FORMULARY CPAP and 02 gets this from [facility]   Yes Thalia Garner MD   tamsulosin (FLOMAX) 0.4 MG capsule 24 hr capsule Take 1 capsule by mouth 2 (Two) Times a Day.   Yes Thalia Garner MD   vitamin B-12 (CYANOCOBALAMIN) 1000 MCG tablet Take 1 tablet by mouth Daily.   Yes Thalia Garner MD   vitamin C (ASCORBIC ACID) 500 MG tablet Take 1 tablet by mouth Daily.   Yes Thalia Garner MD   warfarin (COUMADIN) 6 MG tablet Take 1 tablet by mouth Daily. 6 mg every day except Wednesday and it is 9 mg   Yes Thalia Garner MD   sacubitril-valsartan (Entresto) 24-26 MG tablet Take 1 tablet by mouth 2 (Two) Times a Day. 5/25/23   Kannan Piña MD   sacubitril-valsartan (Entresto) 24-26 MG tablet Take 1 tablet by mouth 2 (Two) Times a Day. 5/25/23   Kannan Piña MD   lisinopril (PRINIVIL,ZESTRIL) 20 MG tablet Take 1 tablet by mouth Daily.  Patient not taking: Reported on 5/25/2023 5/25/23  Thalia Garner MD     Allergies:  Allergies   Allergen Reactions    Ciprofloxacin Diarrhea       Objective     Vital Signs: /80   Pulse 61   Ht  "177.8 cm (70\")   Wt 83 kg (183 lb)   SpO2 98%   BMI 26.26 kg/m²     Vitals and nursing note reviewed.   Constitutional:       General: Not in acute distress.     Appearance: Not in distress.   Neck:      Vascular: No JVD or JVR. JVD normal.   Pulmonary:      Effort: Pulmonary effort is normal.      Breath sounds: Examination of the left-upper field reveals rales. Examination of the left-middle field reveals rales. Examination of the right-lower field reveals rales. Examination of the left-lower field reveals rales. Rales present.   Cardiovascular:      Normal rate. Irregularly irregular rhythm.      Murmurs: There is a grade 3/6 high frequency blowing holosystolic murmur at the LLSB and ULSB, radiating to the apex.      No gallop. No rub.   Pulses:     Intact distal pulses.   Skin:     General: Skin is warm and dry.   Neurological:      Mental Status: Alert, oriented to person, place, and time and oriented to person, place and time.         Results Reviewed:    Multiple records, both progress notes, and procedural imaging reports through [facility] were reviewed, from which I glean details presented above in the HPI    Results for orders placed during the hospital encounter of 03/10/23    Adult Transthoracic Echo Complete W/ Cont if Necessary Per Protocol    Interpretation Summary    Left ventricular systolic function is moderately decreased. Left ventricular ejection fraction appears to be 36 - 40%.    The left ventricular cavity is mildly dilated.    The following left ventricular wall segments are akinetic: mid anterior, apical anterior, apical septal, apex and mid anteroseptal.    Left atrial volume is severely increased.    The right atrial cavity is severely  dilated.    Mild to moderate mitral valve regurgitation is present with an eccentric jet noted    Mild pulmonary hypertension is present.      Independent review of imaging: Since the echocardiogram reported above was interpreted by another " cardiologist, I did independently review those images, with my conclusions as follows: The eccentric nature (directed posteriorly) of the mitral regurgitation technically constitutes severe mitral regurgitation. I did measure a vena contract manually of 0.6 cm. The posterior mitral valve leaflet appears to have reduced mobility compared to the anterior leaflet, though no obvious prolapse is present. The aortic valve has trace to mild insufficiency. The left ventricle itself is dilated with a diastolic diameter of 6.1 cm. The left atrium itself is severely dilated. The right ventricle appears to have normal size, and function. The right atrium is also severely enlarged. Definity contrast was used to enhance endocardial definition and those images were reviewed, which show the ventricle to be a bit weaker than it appeared on the non-contrasted images. The inferior wall itself appears more thin, and hypokinetic than the other wall segments, but there is global hypokinesis of a mild degree. Based on these images, I do agree with the interpretation provided that his EF is around 35 to 40%, probably closer to 40%.      ECG 12 Lead    Date/Time: 5/25/2023 2:47 PM  Performed by: Kannan Piña MD  Authorized by: Kannan Piña MD   Comparison: compared with previous ECG   Rhythm: atrial fibrillation  BPM: 61  Other findings comments: anterolateral infarct, age undetermined    Clinical impression: abnormal EKG              Lab Results   Component Value Date    CHOL 159 09/26/2016    TRIG 121 09/26/2016    HDL 45 09/26/2016    VLDL 24.2 09/26/2016    LDLHDL 2.00 09/26/2016     Lab Results   Component Value Date    HGBA1C 6.10 (H) 02/12/2023           Assessment / Plan        Problem List Items Addressed This Visit          Cardiac and Vasculature    Essential hypertension (Chronic)    Relevant Orders    BNP (Completed)    Comprehensive Metabolic Panel (Completed)    TSH (Completed)    Atrial fibrillation (Chronic)     Relevant Medications    sacubitril-valsartan (Entresto) 24-26 MG tablet    sacubitril-valsartan (Entresto) 24-26 MG tablet    Other Relevant Orders    BNP (Completed)    Comprehensive Metabolic Panel (Completed)    TSH (Completed)    Chronic combined systolic and diastolic CHF (congestive heart failure) - Primary (Chronic)    Relevant Medications    sacubitril-valsartan (Entresto) 24-26 MG tablet    sacubitril-valsartan (Entresto) 24-26 MG tablet    Other Relevant Orders    BNP (Completed)    Comprehensive Metabolic Panel (Completed)    TSH (Completed)    Nonrheumatic mitral valve regurgitation (Chronic)    Overview     Eccentric (posteriorly directed) and therefore severe         Relevant Medications    sacubitril-valsartan (Entresto) 24-26 MG tablet    sacubitril-valsartan (Entresto) 24-26 MG tablet    Other Relevant Orders    BNP (Completed)    Comprehensive Metabolic Panel (Completed)    TSH (Completed)       Coag and Thromboembolic    Chronic anticoagulation    Overview     Has been on coumadin for CVA prophylaxis with AF - never switched to DOAC because, as family states, he was told his AF was valvular.  This is actually not the case - as 'valvular af' implies rheumatic mitral stenosis (and not regurgitation, which is what he has)                Recommendations/plans:  I spent a great deal of time today reviewing the patient's records, and imaging as well as discussing all of them with the patient, his daughter, and his granddaughter. We reviewed the images of his most recent echocardiogram in the room. Unfortunately, his left ventricle is dilated at 6.1 cm, and appears to have moderate LV systolic dysfunction with an EF of 35 to 40%. In addition to this, he has what I would label as severe mitral regurgitation due to its eccentricity (it is directed distinctly posteriorly, which makes all quantitative assessments of severity invalid, and, by definition, eccentric jets are severe). His left atrium is also  severely dilated, and he has permanent atrial fibrillation    Given all those findings, it is rather difficult at this point in time to say which of the issues came first, and led to the others, or whether they are connected at all. Unfortunately, a principal concern of mine is that his mitral regurgitation has been under-appreciated (with regards to severity) for many years due to its eccentric nature -- his previous cardiology providers labeled it as mild to moderate, even though acknowledging at the time that it was eccentric.      If this eccentric mitral regurgitation has now been present for as long as it sounds like it has been, it certainly that could have induced LV dilatation and dysfunction, and it also could have led to the development of now permanent nature of his atrial fibrillation with elevated pulmonary pressures as well. However, he has not ever had any other sort of invasive investigation to rule out ischemic etiologies for his LV dysfunction.     At this point, of most concern given all the above is the fact that he is currently on no medical therapy for systolic heart failure. He is NYHA 2 by description, and euvolemic on exam. Therefore, I do not think he needs to be diuresed at present, but we need to aggressively implement guideline-directed medical therapy for systolic failure. We will start by checking labs today with his CMP, and a BNP, and then pending any unforeseen abnormalities there, advised that he start taking low-dose Entresto. I did provide him with 2 weeks of samples today, and sent in a prescription to his pharmacy. If we are able to start that, I would like to see him back in the office in 1 to 2 weeks for reassessment along with repeat labs, and at that point, we will likely consider starting SGLT2 inhibitor.     Of note, he has previously been on a beta-blocker in the past, but this resulted in symptomatic slow ventricular response to his atrial fibrillation, so we will  avoid beta-blocker for the time being. We will instead work on optimizing ARNI, SGLT2 inhibitor, and MRA first.      I would like to have him on optimally tolerated guideline-directed medical therapy after a series of frequent visits over the next couple of months, and plan on repeating an echocardiogram in about 3 months.  If at that point in time, his ventricular dysfunction is significantly improved -  that it would likely suggest the valve itself is not the original etiology for LV dysfunction, and if he is doing well, then no other therapy or investigation may be warranted. However, if his LV still appears essentially the same or without significant improvement, then I will recommend an invasive left and right heart catheterization with coronary angiography, and also invasive hemodynamic assessment of cardiac output and valvular dysfunction.    Lastly, we did discuss the fact that he should remain protected against stroke because of permanent atrial fibrillation.  Since he has mitral regurgitation (not stenosis), I did educate the patient, and his family that his atrial fibrillation, in fact, would be classified as nonvalvular, and therefore, other options besides warfarin would be on the table in terms of stroke protection (i.e. Eliquis or Xarelto). Valvular atrial fibrillation implies rheumatic mitral stenosis as the source, and this is not his valvular lesion. However, to his credit, he has done remarkably well with home INR monitoring, and Coumadin adjustment to keep his INR in range, so for now, in light of the need for multiple other medication changes as alluded to above, I recommended that he simply continue Coumadin, but we can certainly address this further in the future as noted above. We will plan to follow up in a few weeks to assess for response to initiation of Entresto.    69 minutes was spent on day of service.          Transcribed from ambient dictation for Kannan Piña MD by Angie  Betty.  05/25/23   19:44 CDT    Patient or patient representative verbalized consent to the visit recording.    I Kannan Piña MD have personally performed the services described in this document as scribed by the above individual, and it is both accurate and complete.   I have edited each component as needed.    Kannan Piña MD  5/28/2023  19:48 CDT

## 2023-05-26 DIAGNOSIS — I50.42 CHRONIC COMBINED SYSTOLIC AND DIASTOLIC CHF (CONGESTIVE HEART FAILURE): Primary | Chronic | ICD-10-CM

## 2023-06-08 NOTE — PROGRESS NOTES
" OLEG Granda  [facility]  [address]            [place] [address]  Phone: [phone]  Fax: [phone]          Chief Complaint  Pneumonia due to infectious organism, unspecified    Subjective    History of Present Illness  Riley Foote presents to [facility]   The patient presents today for f/u of PNA and bronchiectasis. The patient's granddaughter is on the phone per patient request.  The patient has known bronchiectasis, chronic respiratory failure with hypoxia-nocturnal, GGO, SIMÓN, and emphysema.  The patient is using Trelegy, albuterol HFA/neb as needed.  The patient reports that he has received new PAP device.  He is benefiting from it and wishes to continue it.  Compliance report shows good compliance and AHI within normal limits.  Patient also reports that he is utilizing chest percussion therapy.  He is benefiting from it and wishes to continue it.  CT of the chest was completed shortly after office visit and discussed with his granddaughter via telephone. The report shows almost complete resolution of left lower lobe pneumonia.  The patient continues to golf multiple times a week.  He denies fevers, chills, cough with purulent sputum.  He has no new pulmonary complaints today.  Objective   Vital Signs:   /82   Pulse 69   Ht 177.8 cm (70\")   Wt 82.1 kg (181 lb)   SpO2 98% Comment: RA  BMI 25.97 kg/m²     Physical Exam  Vitals reviewed.   Constitutional:       General: He is not in acute distress.     Appearance: He is well-developed.   HENT:      Head: Normocephalic and atraumatic.   Eyes:      General: No scleral icterus.     Conjunctiva/sclera: Conjunctivae normal.      Pupils: Pupils are equal, round, and reactive to light.   Cardiovascular:      Rate and Rhythm: Normal rate.   Pulmonary:      Effort: Pulmonary effort is normal. No respiratory distress.      Breath sounds: Normal breath sounds. No " wheezing or rales.   Musculoskeletal:         General: Normal range of motion.      Cervical back: Normal range of motion and neck supple.   Skin:     General: Skin is warm and dry.   Neurological:      Mental Status: He is alert and oriented to person, place, and time.   Psychiatric:         Behavior: Behavior normal.         Thought Content: Thought content normal.         Judgment: Judgment normal.      Result Review :  The following data was reviewed by: OLEG Bermeo on 06/12/2023:    CT Chest Without Contrast Diagnostic (06/12/2023 15:47)   Summary:  1. Almost complete resolution of left lower lobe pneumonia.  2. Underlying chronic lung changes.    Rest/Exercise Pulse Ox Values          3/27/2023    13:30   Rest/Exercise Pulse Ox Results   Rest room air SAT % 98   Exercise room air SAT % 98         Results for orders placed during the hospital encounter of 04/11/23    Pulmonary Function Test    [facility] - Pulmonary Function Test    [address] [place] [address]  [place]  [address]  [phone]    Patient : Riley Foote  MRN : [id number]  CSN : [id number]  Pulmonologist : Antoine Mayes MD  Date : 4/11/2023    ______________________________________________________________________    Interpretation :  1.  Spirometry is within normal limits and in fact midflows are supranormal.  2.  Lung volumes reveal a decrease in expiratory reserve volume with a coexisting increase in inspiratory capacity and otherwise are within normal limits.  3.  There is a mild diffusion impairment even when corrected for alveolar volume.      Antoine Mayes MD           Assessment and Plan  Diagnoses and all orders for this visit:    1. Pneumonia due to infectious organism, unspecified laterality, unspecified part of lung (Primary)  Overview:  CT chest 2/15/23 - Persistent patchy left lower lobe opacities with new scattered groundglass opacities with bilateral upper and right lower lobes worrisome for  multifocal pneumonia. Similar bronchiectasis with peribronchial thickening. Reactive appearing mediastinal and possibly left hilar lymph node nodes.    CXR 3/9/23 - Previously identified bibasilar pulmonary infiltrates (left greater than right) are much improved on today's exam. No new or worsening infiltrate.    CT chest 6/12/23 - Almost complete resolution of left lower lobe pneumonia.    Assessment & Plan:  Resolving per imaging. Clinically resolved.       2. Allergic rhinitis, unspecified seasonality, unspecified trigger  Overview:  Astelin, Flonase    Assessment & Plan:  Continue current treatment regimen.        3. Bronchiectasis without complication  Overview:  CT chest 2/15/23 - Similar bronchiectasis with peribronchial thickening.    3/27/23 elevated IgM, decreased IgG subclass 2  3/27/23 IgA, IgE, IgG, IgG subclass 1, IgG subclass 3, IgG subclass 4 WNL  5/12/23 VICK, PE, FLC, Serum - no M spike, Kappa/Lambda ratio WNL     Trelegy, albuterol HFA/neb    CPT, Mucinex    Assessment & Plan:  Continue current treatment regimen.        4. Chronic respiratory failure with hypoxia  Overview:  Noc O2 2L    Assessment & Plan:  The patient reports that he has received his new PAP device.  He is using his new device plus O2, benefiting from it and wishes to continue it.  Compliance report shows compliance and AHI within normal limits.      5. Obstructive sleep apnea  Overview:  PAP+2L     Assessment & Plan:  The patient reports that he has received his new PAP device.  He is using his new device plus O2, benefiting from it and wishes to continue it.  Compliance report shows compliance and AHI within normal limits.      6. Other emphysema  Overview:  3/27/23 alpha 1 MM    Trelegy-100, albuterol HFA/neb      7. Gastroesophageal reflux disease, unspecified whether esophagitis present  Overview:  Nexium     Assessment & Plan:  Continue current treatment regimen.            Follow Up  Ilene Oliveros,  APRN  6/13/2023  15:01 CDT    Return for CT, will notify patient of next appt after CT.    Patient was given instructions and counseling regarding his condition or for health maintenance advice. Please see specific information pulled into the AVS if appropriate.     Please note that portions of this note were completed with a voice recognition program.

## 2023-06-09 ENCOUNTER — LAB (OUTPATIENT)
Dept: LAB | Facility: HOSPITAL | Age: 83
End: 2023-06-09
Payer: MEDICARE

## 2023-06-09 ENCOUNTER — OFFICE VISIT (OUTPATIENT)
Dept: CARDIOLOGY | Facility: CLINIC | Age: 83
End: 2023-06-09
Payer: MEDICARE

## 2023-06-09 VITALS
HEIGHT: 70 IN | OXYGEN SATURATION: 98 % | DIASTOLIC BLOOD PRESSURE: 68 MMHG | SYSTOLIC BLOOD PRESSURE: 117 MMHG | BODY MASS INDEX: 25.77 KG/M2 | WEIGHT: 180 LBS | HEART RATE: 61 BPM

## 2023-06-09 DIAGNOSIS — I50.42 CHRONIC COMBINED SYSTOLIC AND DIASTOLIC CHF (CONGESTIVE HEART FAILURE): Chronic | ICD-10-CM

## 2023-06-09 DIAGNOSIS — Z79.01 CHRONIC ANTICOAGULATION: ICD-10-CM

## 2023-06-09 DIAGNOSIS — I50.42 CHRONIC COMBINED SYSTOLIC AND DIASTOLIC CHF (CONGESTIVE HEART FAILURE): Primary | Chronic | ICD-10-CM

## 2023-06-09 DIAGNOSIS — I48.21 PERMANENT ATRIAL FIBRILLATION: Chronic | ICD-10-CM

## 2023-06-09 DIAGNOSIS — I10 ESSENTIAL HYPERTENSION: Chronic | ICD-10-CM

## 2023-06-09 DIAGNOSIS — I34.0 NONRHEUMATIC MITRAL VALVE REGURGITATION: Chronic | ICD-10-CM

## 2023-06-09 LAB
ANION GAP SERPL CALCULATED.3IONS-SCNC: 11 MMOL/L (ref 5–15)
BUN SERPL-MCNC: 20 MG/DL (ref 8–23)
BUN/CREAT SERPL: 17.4 (ref 7–25)
CALCIUM SPEC-SCNC: 9.7 MG/DL (ref 8.6–10.5)
CHLORIDE SERPL-SCNC: 103 MMOL/L (ref 98–107)
CO2 SERPL-SCNC: 26 MMOL/L (ref 22–29)
CREAT SERPL-MCNC: 1.15 MG/DL (ref 0.76–1.27)
EGFRCR SERPLBLD CKD-EPI 2021: 63.1 ML/MIN/1.73
GLUCOSE SERPL-MCNC: 187 MG/DL (ref 65–99)
POTASSIUM SERPL-SCNC: 4.9 MMOL/L (ref 3.5–5.2)
SODIUM SERPL-SCNC: 140 MMOL/L (ref 136–145)

## 2023-06-09 PROCEDURE — 80048 BASIC METABOLIC PNL TOTAL CA: CPT

## 2023-06-09 PROCEDURE — 36415 COLL VENOUS BLD VENIPUNCTURE: CPT

## 2023-06-09 RX ORDER — DAPAGLIFLOZIN 10 MG/1
10 TABLET, FILM COATED ORAL DAILY
Qty: 30 TABLET | Refills: 11 | Status: SHIPPED | OUTPATIENT
Start: 2023-06-09

## 2023-06-09 RX ORDER — DAPAGLIFLOZIN 10 MG/1
10 TABLET, FILM COATED ORAL DAILY
Qty: 14 TABLET | Refills: 0 | COMMUNITY
Start: 2023-06-09

## 2023-06-09 NOTE — PROGRESS NOTES
Subjective:     Encounter Date:06/09/2023      Patient ID: Riley Foote is a 82 y.o. male.    Chief Complaint: Follow-up initiation of medical therapy for CHF.  History of Present Illness     Mr. Foote returns today after initially being evaluated here on 05/25/2023. Please see note there for full details regarding his prior cardiac care at [facility]. I was concerned when meeting him that his mitral regurgitation was likely more severe than ever, having previously been acknowledged, and had led to left ventricular dilation, and dysfunction with an EF of 35 to 40 percent, as well as permanent atrial fibrillation, and pulmonary hypertension. He was not on any medical therapy at the time, so we checked labs, and then have started therapy with Entresto. He returns today for re-evaluation of the above.    Mr. Foote returns to the clinic today, accompanied on the phone by his granddaughter (Dayami Luongtreet, OLEG), who contributes to his history. He is feeling well, and he can tell a difference. He started Entresto 2 times per day. He denies any issues with low blood pressures or dizziness when he stands. He feels like his energy is already a little better since starting the Entresto. He has been playing 36 holes of golf a day, and he feels good. He has not seen any concerns or issues.    Dayami explains that he has had some issues with urinary tract infections in the past because of BPH issues, but he has not had one in almost 1 year. He reports that he has a history of bladder cancer.         The following portions of the patient's history were reviewed and updated as appropriate: allergies, current medications, past family history, past medical history, past social history, past surgical history, and problem list.    Review of Systems   Constitutional: Negative for malaise/fatigue.   Cardiovascular:  Negative for chest pain, claudication, dyspnea on exertion, leg swelling, near-syncope, orthopnea,  palpitations, paroxysmal nocturnal dyspnea and syncope.   Respiratory:  Negative for shortness of breath.    Hematologic/Lymphatic: Does not bruise/bleed easily.         Current Outpatient Medications:     albuterol (PROVENTIL) (2.5 MG/3ML) 0.083% nebulizer solution, Take 2.5 mg by nebulization Every 4 (Four) Hours As Needed for Wheezing., Disp: , Rfl:     Apoaequorin (PREVAGEN PO), Take  by mouth., Disp: , Rfl:     aspirin 81 MG chewable tablet, Chew 1 tablet Daily., Disp: , Rfl:     azelastine (ASTELIN) 0.1 % nasal spray, 2 sprays into the nostril(s) as directed by provider 2 (Two) Times a Day. Use in each nostril as directed, Disp: , Rfl:     esomeprazole (nexIUM) 40 MG capsule, Take 1 capsule by mouth Every Morning Before Breakfast., Disp: , Rfl:     FLUoxetine (PROzac) 20 MG capsule, Take 1 capsule by mouth Daily., Disp: , Rfl:     fluticasone (FLONASE) 50 MCG/ACT nasal spray, 2 sprays into the nostril(s) as directed by provider Daily., Disp: , Rfl:     Fluticasone-Umeclidin-Vilant (Trelegy Ellipta) 200-62.5-25 MCG/ACT aerosol powder , 1 puff Daily., Disp: , Rfl:     gabapentin (NEURONTIN) 300 MG capsule, Take 2 capsules by mouth Daily. At bedtime, Disp: , Rfl:     guaiFENesin (MUCINEX) 600 MG 12 hr tablet, Take 2 tablets by mouth 2 (Two) Times a Day., Disp: , Rfl:     Misc Natural Products (OSTEO BI-FLEX ADV JOINT SHIELD PO), Take 1 tablet by mouth Daily., Disp: , Rfl:     Multiple Vitamins-Minerals (CENTRUM SILVER) tablet, 1 tablet Daily., Disp: , Rfl:     NON FORMULARY, CPAP and 02 gets this from [facility], Disp: , Rfl:     sacubitril-valsartan (Entresto) 24-26 MG tablet, Take 1 tablet by mouth 2 (Two) Times a Day., Disp: 60 tablet, Rfl: 11    tamsulosin (FLOMAX) 0.4 MG capsule 24 hr capsule, Take 1 capsule by mouth 2 (Two) Times a Day., Disp: , Rfl:     vitamin B-12 (CYANOCOBALAMIN) 1000 MCG tablet, Take 1 tablet by mouth Daily., Disp: , Rfl:     vitamin C (ASCORBIC ACID) 500 MG tablet, Take 1 tablet by mouth  Daily., Disp: , Rfl:     warfarin (COUMADIN) 6 MG tablet, Take 1 tablet by mouth Daily. 6 mg every day except Wednesday and it is 9 mg, Disp: , Rfl:     dapagliflozin Propanediol (Farxiga) 10 MG tablet, Take 10 mg by mouth Daily., Disp: 30 tablet, Rfl: 11    dapagliflozin Propanediol (Farxiga) 10 MG tablet, Take 10 mg by mouth Daily., Disp: 14 tablet, Rfl: 0       Objective:      Vitals:    06/09/23 1143   BP: 117/68   Pulse: 61   SpO2: 98%     Vitals and nursing note reviewed.   Constitutional:       General: Not in acute distress.     Appearance: Not in distress.   Neck:      Vascular: No JVD or JVR. JVD normal.   Pulmonary:      Effort: Pulmonary effort is normal.      Breath sounds: Normal breath sounds.   Cardiovascular:      Normal rate. Irregularly irregular rhythm.      Murmurs: There is a grade 2/6 systolic murmur.      No gallop.  No rub.   Pulses:     Intact distal pulses.   Skin:     General: Skin is warm and dry.   Neurological:      Mental Status: Alert, oriented to person, place, and time and oriented to person, place and time.       Lab Review:         ECG 12 Lead    Date/Time: 6/9/2023 11:53 AM  Performed by: Kannan Piña MD  Authorized by: Kannan Piña MD   Comparison: compared with previous ECG from 5/25/2023  Comparison to previous ECG: No  change  Rhythm: atrial fibrillation  BPM: 61  Q waves: aVL and I      Clinical impression: abnormal EKG      Basic Metabolic Panel    Sodium Sodium   Date Value Ref Range Status   06/09/2023 140 136 - 145 mmol/L Final      Potassium Potassium   Date Value Ref Range Status   06/09/2023 4.9 3.5 - 5.2 mmol/L Final      Chloride Chloride   Date Value Ref Range Status   06/09/2023 103 98 - 107 mmol/L Final      Bicarbonate No results found for: PLASMABICARB   BUN BUN   Date Value Ref Range Status   06/09/2023 20 8 - 23 mg/dL Final      Creatinine Creatinine   Date Value Ref Range Status   06/09/2023 1.15 0.76 - 1.27 mg/dL Final      Calcium Calcium   Date  Value Ref Range Status   06/09/2023 9.7 8.6 - 10.5 mg/dL Final      Glucose      No components found for: GLUCOSE.*         Assessment/Plan:     Problem List Items Addressed This Visit          Cardiac and Vasculature    Essential hypertension (Chronic)    Atrial fibrillation (Chronic): Permanent, remains rate controlled and otherwise asymptomatic.  Appropriately anticoagulated (though previously discussed that this in fact is nonvalvular atrial fibrillation, and he could be considered to transition to NOAC instead of Coumadin)    Chronic combined systolic and diastolic CHF (congestive heart failure) - Primary (Chronic)    Nonrheumatic mitral valve regurgitation (Chronic)    Overview     Eccentric (posteriorly directed) and therefore severe            Coag and Thromboembolic    Chronic anticoagulation    Overview     Has been on coumadin for CVA prophylaxis with AF - never switched to DOAC because, as family states, he was told his AF was valvular.  This is actually not the case - as 'valvular af' implies rheumatic mitral stenosis (and not regurgitation, which is what he has)              Recommendations/plans:    Thus far, Mr. Foote symptomatically tolerated the addition of Entresto very well, and actually noted some improved energy. His vitals look great today, and his blood work also looks fine. Therefore, we will continue our previously stated plans of implementing guideline directed medical therapy by adding Farxiga 10 mg daily. I provided him with 2 weeks of samples, and we will send a prescription to his pharmacy once Epic will allow me to do so. We also need to recheck a BMP in 1 week, and see him back in office for follow-up thereafter.    Otherwise, we will continue frequent follow-ups and medication adjustments as we attempt a 3-month window of initiation and optimization of medical therapy for congestive heart failure, before re-analyzing his LV function.     Transcribed from ambient dictation for Kannan  CANDIS Piña MD by Angie Hernández.  06/09/23   13:10 CDT    Patient or patient representative verbalized consent to the visit recording.    I Kannan Piña MD have personally performed the services described in this document as scribed by the above individual, and it is both accurate and complete.   I have edited each component as needed.    Kannan Piña MD  6/9/2023  23:18 CDT

## 2023-06-12 ENCOUNTER — OFFICE VISIT (OUTPATIENT)
Dept: PULMONOLOGY | Facility: CLINIC | Age: 83
End: 2023-06-12
Payer: MEDICARE

## 2023-06-12 ENCOUNTER — HOSPITAL ENCOUNTER (OUTPATIENT)
Dept: CT IMAGING | Facility: HOSPITAL | Age: 83
Discharge: HOME | End: 2023-06-12
Admitting: NURSE PRACTITIONER
Payer: MEDICARE

## 2023-06-12 VITALS
BODY MASS INDEX: 25.91 KG/M2 | HEIGHT: 70 IN | HEART RATE: 69 BPM | OXYGEN SATURATION: 98 % | DIASTOLIC BLOOD PRESSURE: 82 MMHG | WEIGHT: 181 LBS | SYSTOLIC BLOOD PRESSURE: 136 MMHG

## 2023-06-12 DIAGNOSIS — R59.9 REACTIVE LYMPHADENOPATHY: ICD-10-CM

## 2023-06-12 DIAGNOSIS — J30.9 ALLERGIC RHINITIS, UNSPECIFIED SEASONALITY, UNSPECIFIED TRIGGER: Chronic | ICD-10-CM

## 2023-06-12 DIAGNOSIS — J43.8 OTHER EMPHYSEMA: Chronic | ICD-10-CM

## 2023-06-12 DIAGNOSIS — J18.9 PNEUMONIA DUE TO INFECTIOUS ORGANISM, UNSPECIFIED LATERALITY, UNSPECIFIED PART OF LUNG: Primary | ICD-10-CM

## 2023-06-12 DIAGNOSIS — J47.9 BRONCHIECTASIS WITHOUT COMPLICATION: Chronic | ICD-10-CM

## 2023-06-12 DIAGNOSIS — J96.11 CHRONIC RESPIRATORY FAILURE WITH HYPOXIA: Chronic | ICD-10-CM

## 2023-06-12 DIAGNOSIS — J18.9 PNEUMONIA DUE TO INFECTIOUS ORGANISM, UNSPECIFIED LATERALITY, UNSPECIFIED PART OF LUNG: ICD-10-CM

## 2023-06-12 DIAGNOSIS — K21.9 GASTROESOPHAGEAL REFLUX DISEASE, UNSPECIFIED WHETHER ESOPHAGITIS PRESENT: Chronic | ICD-10-CM

## 2023-06-12 DIAGNOSIS — G47.33 OBSTRUCTIVE SLEEP APNEA: Chronic | ICD-10-CM

## 2023-06-12 DIAGNOSIS — R91.8 GROUND GLASS OPACITY PRESENT ON IMAGING OF LUNG: ICD-10-CM

## 2023-06-12 PROCEDURE — 1160F RVW MEDS BY RX/DR IN RCRD: CPT | Performed by: NURSE PRACTITIONER

## 2023-06-12 PROCEDURE — 3079F DIAST BP 80-89 MM HG: CPT | Performed by: NURSE PRACTITIONER

## 2023-06-12 PROCEDURE — 99214 OFFICE O/P EST MOD 30 MIN: CPT | Performed by: NURSE PRACTITIONER

## 2023-06-12 PROCEDURE — 71250 CT THORAX DX C-: CPT

## 2023-06-12 PROCEDURE — 3075F SYST BP GE 130 - 139MM HG: CPT | Performed by: NURSE PRACTITIONER

## 2023-06-12 PROCEDURE — 1159F MED LIST DOCD IN RCRD: CPT | Performed by: NURSE PRACTITIONER

## 2023-06-13 ENCOUNTER — TELEPHONE (OUTPATIENT)
Dept: PULMONOLOGY | Facility: CLINIC | Age: 83
End: 2023-06-13
Payer: MEDICARE

## 2023-06-13 PROBLEM — R59.9 REACTIVE LYMPHADENOPATHY: Status: RESOLVED | Noted: 2023-03-27 | Resolved: 2023-06-13

## 2023-06-13 PROBLEM — R91.8 GROUND GLASS OPACITY PRESENT ON IMAGING OF LUNG: Status: RESOLVED | Noted: 2023-03-27 | Resolved: 2023-06-13

## 2023-06-13 NOTE — PATIENT INSTRUCTIONS
Bronchiectasis  Bronchiectasis is a condition in which the airways in the lungs (bronchi) are damaged and widened. The condition makes it hard for the lungs to get rid of mucus, and it causes mucus to gather in the bronchi. This condition often leads to lung infections, which can make the condition worse.  What are the causes?  You can be born with this condition, or you can develop it later in life. Common causes of this condition include:  Cystic fibrosis.  Repeated lung infections, such as pneumonia or tuberculosis.  An object or other blockage in the lungs.  Breathing in fluid, food, or other objects (aspiration).  A problem with the body's defense system (immune system) and lung structure that is present at birth (congenital).  Sometimes the cause is not known.  What are the signs or symptoms?  Common symptoms of this condition include:  A daily cough that brings up mucus and lasts for more than 3 weeks.  Lung infections that happen often.  Shortness of breath and wheezing.  Weakness and feeling tired (fatigue).  How is this diagnosed?  This condition is diagnosed with tests, such as:  Chest X-rays or CT scans. These are done to check for changes in the lungs.  Breathing tests. These are done to check how well your lungs are working.  A test of a sample of your saliva (sputum culture). This test is done to check for infection.  Blood tests and other tests. These are done to check for related diseases or causes.  How is this treated?  Treatment for this condition depends on the severity of the illness and its cause. Treatment may include:  Medicines that loosen mucus so it can be coughed up (mucolytics).  Medicines that relax the muscles of the bronchi (bronchodilators).  Antibiotic medicines to prevent or treat infection.  Physical therapy to help clear mucus from the lungs. Techniques may include:  Postural drainage. This is when you sit or lie in certain positions so that mucus can drain by gravity.  Chest  percussion. This involves tapping the chest or back with a cupped hand.  Chest vibration. For this therapy, a hand or special equipment vibrates your chest and back.  Surgery to remove the affected part of the lung. This may be done in severe cases.  Follow these instructions at home:  Medicines  Take over-the-counter and prescription medicines only as told by your health care provider.  If you were prescribed an antibiotic medicine, take it as told by your health care provider. Do not stop taking the antibiotic even if you start to feel better.  Avoid taking sedatives and antihistamines unless your health care provider tells you to take them. These medicines tend to thicken the mucus in the lungs.  Managing symptoms  Do breathing exercises or techniques to clear your lungs as told by your health care provider.  Consider using a cold steam vaporizer or humidifier in your room or home to help loosen secretions.  If you have a cough that gets worse at night, try sleeping in a semi-upright position.  General instructions  Get plenty of rest.  Drink enough fluid to keep your urine pale yellow.  Stay inside when pollution and ozone levels are high.  Stay up to date with vaccinations and immunizations.  Avoid cigarette smoke and other lung irritants.  Do not use any products that contain nicotine or tobacco. These products include cigarettes, chewing tobacco, and vaping devices, such as e-cigarettes. If you need help quitting, ask your health care provider.  Keep all follow-up visits. This is important.  Contact a health care provider if:  You cough up more sputum than before and the sputum is yellow or green in color.  You have a fever or chills.  You cannot control your cough and are losing sleep.  Get help right away if:  You cough up blood.  You have chest pain.  You have increasing shortness of breath.  You have pain that gets worse or is not controlled with medicines.  You have a fever and your symptoms suddenly get  worse.  These symptoms may be an emergency. Get help right away. Call 911.  Do not wait to see if the symptoms will go away.  Do not drive yourself to the hospital.  Summary  Bronchiectasis is a condition in which the airways in the lungs (bronchi) are damaged and widened. The condition makes it hard for the lungs to get rid of mucus, and it causes mucus to gather in the bronchi.  Treatment usually includes therapy to help clear mucus from the lungs.  Avoid cigarette smoke and other lung irritants.  Stay up to date with vaccinations and immunizations.  This information is not intended to replace advice given to you by your health care provider. Make sure you discuss any questions you have with your health care provider.  Document Revised: 08/31/2022 Document Reviewed: 07/19/2022  [employer]) Patient Education © 2022 [employer]  Gastroesophageal Reflux Disease, Adult  Gastroesophageal reflux (JULIA) happens when acid from the stomach flows up into the tube that connects the mouth and the stomach (esophagus). Normally, food travels down the esophagus and stays in the stomach to be digested. With JULIA, food and stomach acid sometimes move back up into the esophagus.  You may have a disease called gastroesophageal reflux disease (GERD) if the reflux:  Happens often.  Causes frequent or very bad symptoms.  Causes problems such as damage to the esophagus.  When this happens, the esophagus becomes sore and swollen. Over time, GERD can make small holes (ulcers) in the lining of the esophagus.  What are the causes?  This condition is caused by a problem with the muscle between the esophagus and the stomach. When this muscle is weak or not normal, it does not close properly to keep food and acid from coming back up from the stomach.  The muscle can be weak because of:  Tobacco use.  Pregnancy.  Having a certain type of hernia (hiatal hernia).  Alcohol use.  Certain foods and drinks, such as coffee, chocolate, onions, and  peppermint.  What increases the risk?  Being overweight.  Having a disease that affects your connective tissue.  Taking NSAIDs, such a ibuprofen.  What are the signs or symptoms?  Heartburn.  Difficult or painful swallowing.  The feeling of having a lump in the throat.  A bitter taste in the mouth.  Bad breath.  Having a lot of saliva.  Having an upset or bloated stomach.  Burping.  Chest pain. Different conditions can cause chest pain. Make sure you see your doctor if you have chest pain.  Shortness of breath or wheezing.  A long-term cough or a cough at night.  Wearing away of the surface of teeth (tooth enamel).  Weight loss.  How is this treated?  Making changes to your diet.  Taking medicine.  Having surgery.  Treatment will depend on how bad your symptoms are.  Follow these instructions at home:  Eating and drinking    Follow a diet as told by your doctor. You may need to avoid foods and drinks such as:  Coffee and tea, with or without caffeine.  Drinks that contain alcohol.  Energy drinks and sports drinks.  Bubbly (carbonated) drinks or sodas.  Chocolate and cocoa.  Peppermint and mint flavorings.  Garlic and onions.  Horseradish.  Spicy and acidic foods. These include peppers, chili powder, moore powder, vinegar, hot sauces, and BBQ sauce.  Citrus fruit juices and citrus fruits, such as oranges, mary, and limes.  Tomato-based foods. These include red sauce, chili, salsa, and pizza with red sauce.  Fried and fatty foods. These include donuts, french fries, potato chips, and high-fat dressings.  High-fat meats. These include hot dogs, rib eye steak, sausage, ham, and yang.  High-fat dairy items, such as whole milk, butter, and cream cheese.  Eat small meals often. Avoid eating large meals.  Avoid drinking large amounts of liquid with your meals.  Avoid eating meals during the 2-3 hours before bedtime.  Avoid lying down right after you eat.  Do not exercise right after you eat.  Lifestyle    Do not smoke or  use any products that contain nicotine or tobacco. If you need help quitting, ask your doctor.  Try to lower your stress. If you need help doing this, ask your doctor.  If you are overweight, lose an amount of weight that is healthy for you. Ask your doctor about a safe weight loss goal.  General instructions  Pay attention to any changes in your symptoms.  Take over-the-counter and prescription medicines only as told by your doctor.  Do not take aspirin, ibuprofen, or other NSAIDs unless your doctor says it is okay.  Wear loose clothes. Do not wear anything tight around your waist.  Raise (elevate) the head of your bed about 6 inches (15 cm). You may need to use a wedge to do this.  Avoid bending over if this makes your symptoms worse.  Keep all follow-up visits.  Contact a doctor if:  You have new symptoms.  You lose weight and you do not know why.  You have trouble swallowing or it hurts to swallow.  You have wheezing or a cough that keeps happening.  You have a hoarse voice.  Your symptoms do not get better with treatment.  Get help right away if:  You have sudden pain in your arms, neck, jaw, teeth, or back.  You suddenly feel sweaty, dizzy, or light-headed.  You have chest pain or shortness of breath.  You vomit and the vomit is green, yellow, or black, or it looks like blood or coffee grounds.  You faint.  Your poop (stool) is red, bloody, or black.  You cannot swallow, drink, or eat.  These symptoms may represent a serious problem that is an emergency. Do not wait to see if the symptoms will go away. Get medical help right away. Call your local emergency services (911 in the [place] Do not drive yourself to the hospital.  Summary  If a person has gastroesophageal reflux disease (GERD), food and stomach acid move back up into the esophagus and cause symptoms or problems such as damage to the esophagus.  Treatment will depend on how bad your symptoms are.  Follow a diet as told by your doctor.  Take all  medicines only as told by your doctor.  This information is not intended to replace advice given to you by your health care provider. Make sure you discuss any questions you have with your health care provider.  Document Revised: 06/28/2021 Document Reviewed: 06/28/2021  Elsevier Patient Education © 2022 [employer]  Allergic Rhinitis, Adult  Allergic rhinitis is an allergic reaction that affects the mucous membrane inside the nose. The mucous membrane is the tissue that produces mucus.  There are two types of allergic rhinitis:  Seasonal. This type is also called hay fever and happens only during certain seasons.  Perennial. This type can happen at any time of the year.  Allergic rhinitis cannot be spread from person to person. This condition can be mild, moderate, or severe. It can develop at any age and may be outgrown.  What are the causes?  This condition is caused by allergens. These are things that can cause an allergic reaction. Allergens may differ for seasonal allergic rhinitis and perennial allergic rhinitis.  Seasonal allergic rhinitis is triggered by pollen. Pollen can come from grasses, trees, and weeds.  Perennial allergic rhinitis may be triggered by:  Dust mites.  Proteins in a pet's urine, saliva, or dander. Dander is dead skin cells from a pet.  Smoke, mold, or car fumes.  What increases the risk?  You are more likely to develop this condition if you have a family history of allergies or other conditions related to allergies, including:  Allergic conjunctivitis. This is inflammation of parts of the eyes and eyelids.  Asthma. This condition affects the lungs and makes it hard to breathe.  Atopic dermatitis or eczema. This is long term (chronic) inflammation of the skin.  Food allergies.  What are the signs or symptoms?  Symptoms of this condition include:  Sneezing or coughing.  A stuffy nose (nasal congestion), itchy nose, or nasal discharge.  Itchy eyes and tearing of the eyes.  A feeling of  mucus dripping down the back of your throat (postnasal drip).  Trouble sleeping.  Tiredness or fatigue.  Headache.  Sore throat.  How is this diagnosed?  This condition may be diagnosed with your symptoms, medical history, and physical exam. Your health care provider may check for related conditions, such as:  Asthma.  Pink eye. This is eye inflammation caused by infection (conjunctivitis).  Ear infection.  Upper respiratory infection. This is an infection in the nose, throat, or upper airways.  You may also have tests to find out which allergens trigger your symptoms. These may include skin tests or blood tests.  How is this treated?  There is no cure for this condition, but treatment can help control symptoms. Treatment may include:  Taking medicines that block allergy symptoms, such as corticosteroids and antihistamines. Medicine may be given as a shot, nasal spray, or pill.  Avoiding any allergens.  Being exposed again and again to tiny amounts of allergens to help you build a defense against allergens (immunotherapy). This is done if other treatments have not helped. It may include:  Allergy shots. These are injected medicines that have small amounts of allergen in them.  Sublingual immunotherapy. This involves taking small doses of a medicine with allergen in it under your tongue.  If these treatments do not work, your health care provider may prescribe newer, stronger medicines.  Follow these instructions at home:  Avoiding allergens  Find out what you are allergic to and avoid those allergens. These are some things you can do to help avoid allergens:  If you have perennial allergies:  Replace carpet with wood, tile, or vinyl filomena. Carpet can trap dander and dust.  Do not smoke. Do not allow smoking in your home.  Change your heating and air conditioning filters at least once a month.  If you have seasonal allergies, take these steps during allergy season:  Keep windows closed as much as possible.  Plan  outdoor activities when pollen counts are lowest. Check pollen counts before you plan outdoor activities.  When coming indoors, change clothing and shower before sitting on furniture or bedding.  If you have a pet in the house that produces allergens:  Keep the pet out of the bedroom.  Vacuum, sweep, and dust regularly.  General instructions  Take over-the-counter and prescription medicines only as told by your health care provider.  Drink enough fluid to keep your urine pale yellow.  Keep all follow-up visits as told by your health care provider. This is important.  Where to find more information  American Academy of Allergy, Asthma & Immunology: www.aaaai.org  Contact a health care provider if:  You have a fever.  You develop a cough that does not go away.  You make whistling sounds when you breathe (wheeze).  Your symptoms slow you down or stop you from doing your normal activities each day.  Get help right away if:  You have shortness of breath.  This symptom may represent a serious problem that is an emergency. Do not wait to see if the symptom will go away. Get medical help right away. Call your local emergency services (911 in the [place] Do not drive yourself to the hospital.  Summary  Allergic rhinitis may be managed by taking medicines as directed and avoiding allergens.  If you have seasonal allergies, keep windows closed as much as possible during allergy season.  Contact your health care provider if you develop a fever or a cough that does not go away.  This information is not intended to replace advice given to you by your health care provider. Make sure you discuss any questions you have with your health care provider.  Document Revised: 02/05/2021 Document Reviewed: 12/15/2020  Elsevier Patient Education © 2022 [employer]

## 2023-06-13 NOTE — ASSESSMENT & PLAN NOTE
The patient reports that he has received his new PAP device.  He is using his new device plus O2, benefiting from it and wishes to continue it.  Compliance report shows compliance and AHI within normal limits.

## 2023-06-13 NOTE — TELEPHONE ENCOUNTER
----- Message from OLEG Bermeo sent at 6/12/2023  5:38 PM CDT -----  Notified patients granddaughter of results.      Kika, will you please call patient and scheduled f/u appt in 4 months.

## 2023-08-14 ENCOUNTER — LAB (OUTPATIENT)
Dept: LAB | Facility: HOSPITAL | Age: 83
End: 2023-08-14
Payer: MEDICARE

## 2023-08-14 ENCOUNTER — OFFICE VISIT (OUTPATIENT)
Dept: CARDIOLOGY | Facility: CLINIC | Age: 83
End: 2023-08-14
Payer: MEDICARE

## 2023-08-14 VITALS
HEART RATE: 75 BPM | WEIGHT: 175 LBS | BODY MASS INDEX: 25.05 KG/M2 | DIASTOLIC BLOOD PRESSURE: 88 MMHG | OXYGEN SATURATION: 96 % | HEIGHT: 70 IN | SYSTOLIC BLOOD PRESSURE: 106 MMHG

## 2023-08-14 DIAGNOSIS — E87.5 HYPERKALEMIA: Primary | ICD-10-CM

## 2023-08-14 DIAGNOSIS — I48.21 PERMANENT ATRIAL FIBRILLATION: Chronic | ICD-10-CM

## 2023-08-14 DIAGNOSIS — I50.42 CHRONIC COMBINED SYSTOLIC AND DIASTOLIC CHF (CONGESTIVE HEART FAILURE): Chronic | ICD-10-CM

## 2023-08-14 DIAGNOSIS — I34.0 NONRHEUMATIC MITRAL VALVE REGURGITATION: Chronic | ICD-10-CM

## 2023-08-14 DIAGNOSIS — I10 ESSENTIAL HYPERTENSION: Primary | Chronic | ICD-10-CM

## 2023-08-14 LAB
ANION GAP SERPL CALCULATED.3IONS-SCNC: 10 MMOL/L (ref 5–15)
BUN SERPL-MCNC: 16 MG/DL (ref 8–23)
BUN/CREAT SERPL: 14.3 (ref 7–25)
CALCIUM SPEC-SCNC: 10 MG/DL (ref 8.6–10.5)
CHLORIDE SERPL-SCNC: 103 MMOL/L (ref 98–107)
CO2 SERPL-SCNC: 26 MMOL/L (ref 22–29)
CREAT SERPL-MCNC: 1.12 MG/DL (ref 0.76–1.27)
EGFRCR SERPLBLD CKD-EPI 2021: 65.2 ML/MIN/1.73
GLUCOSE SERPL-MCNC: 96 MG/DL (ref 65–99)
POTASSIUM SERPL-SCNC: 5.4 MMOL/L (ref 3.5–5.2)
SODIUM SERPL-SCNC: 139 MMOL/L (ref 136–145)

## 2023-08-14 PROCEDURE — 99214 OFFICE O/P EST MOD 30 MIN: CPT | Performed by: NURSE PRACTITIONER

## 2023-08-14 PROCEDURE — 93000 ELECTROCARDIOGRAM COMPLETE: CPT | Performed by: NURSE PRACTITIONER

## 2023-08-14 PROCEDURE — 1160F RVW MEDS BY RX/DR IN RCRD: CPT | Performed by: NURSE PRACTITIONER

## 2023-08-14 PROCEDURE — 1159F MED LIST DOCD IN RCRD: CPT | Performed by: NURSE PRACTITIONER

## 2023-08-14 PROCEDURE — 3079F DIAST BP 80-89 MM HG: CPT | Performed by: NURSE PRACTITIONER

## 2023-08-14 PROCEDURE — 80048 BASIC METABOLIC PNL TOTAL CA: CPT

## 2023-08-14 PROCEDURE — 3074F SYST BP LT 130 MM HG: CPT | Performed by: NURSE PRACTITIONER

## 2023-08-14 PROCEDURE — 36415 COLL VENOUS BLD VENIPUNCTURE: CPT

## 2023-08-14 NOTE — PROGRESS NOTES
Subjective:     Encounter Date: 8/14/2023      Patient ID: Riley Foote is a 82 y.o. male.    Chief Complaint:  Follow-up CHF, and mitral regurgitation    History of Present Illness    The patient presents to follow-up regarding his chronic systolic CHF and mitral regurgitation.  At his last several visits his guideline directed medical therapy has been titrated for his cardiomyopathy.  He initially had improvement in his energy level with Entresto.  He was playing 36 holes of golf a day and feeling well.  He later Farxiga was added.  He did well with this as well.  He had mild dizziness when standing up that had been occurring for years.  He had further slight improvement in his energy level.    By way of review, he was initially referred in May 2023 after deciding to transfer his care from [facility].  Echocardiogram prior to that revealed an LVEF of 36 to 40%, left ventricular dilatation and mitral regurgitation that was felt to be more severe than had been reported as did was an eccentric jet and therefore likely severe.  He also has permanent atrial fibrillation and is anticoagulated with warfarin.    Following his visit on 6/29 labs were checked and Aldactone was added to his medical therapy.    Today the patient reports he has been feeling very well.  It appears he has lost approximately 8 pounds since his last visit.  He believes this was a combination of fluid and due to making better diet choices.  He continues to play golf regularly without any shortness of breath.  He denies chest pain, orthopnea, PND.  He believes his breathing and energy level have improved further since his last visit.  He also believes he has been having less palpitations since his last visit.  He denies syncope.  He continues to have transient dizziness when he first stands up that has not necessarily worsened.        The following portions of the patient's history were reviewed and updated as appropriate: allergies, current  medications, past family history, past medical history, past social history, past surgical history, and problem list.    Review of Systems   Constitutional: Positive for weight loss. Negative for malaise/fatigue.   Cardiovascular:  Negative for chest pain, claudication, dyspnea on exertion, leg swelling, near-syncope, orthopnea, palpitations, paroxysmal nocturnal dyspnea and syncope.   Respiratory:  Negative for cough and shortness of breath.    Hematologic/Lymphatic: Does not bruise/bleed easily.   Musculoskeletal:  Negative for falls.   Gastrointestinal:  Negative for bloating.   Neurological:  Negative for dizziness, light-headedness and weakness.       Current Outpatient Medications:     albuterol (PROVENTIL) (2.5 MG/3ML) 0.083% nebulizer solution, Take 2.5 mg by nebulization Every 4 (Four) Hours As Needed for Wheezing., Disp: , Rfl:     Apoaequorin (PREVAGEN PO), Take  by mouth., Disp: , Rfl:     aspirin 81 MG chewable tablet, Chew 1 tablet Daily., Disp: , Rfl:     azelastine (ASTELIN) 0.1 % nasal spray, 2 sprays into the nostril(s) as directed by provider 2 (Two) Times a Day. Use in each nostril as directed, Disp: , Rfl:     dapagliflozin Propanediol (Farxiga) 10 MG tablet, Take 10 mg by mouth Daily., Disp: 30 tablet, Rfl: 11    esomeprazole (nexIUM) 40 MG capsule, Take 1 capsule by mouth Every Morning Before Breakfast., Disp: , Rfl:     FLUoxetine (PROzac) 20 MG capsule, Take 1 capsule by mouth Daily., Disp: , Rfl:     fluticasone (FLONASE) 50 MCG/ACT nasal spray, 2 sprays into the nostril(s) as directed by provider Daily., Disp: , Rfl:     gabapentin (NEURONTIN) 300 MG capsule, Take 2 capsules by mouth Daily. At bedtime, Disp: , Rfl:     guaiFENesin (MUCINEX) 600 MG 12 hr tablet, Take 2 tablets by mouth 2 (Two) Times a Day., Disp: , Rfl:     Misc Natural Products (OSTEO BI-FLEX ADV JOINT SHIELD PO), Take 1 tablet by mouth Daily., Disp: , Rfl:     Multiple Vitamins-Minerals (CENTRUM SILVER) tablet, 1 tablet  Daily., Disp: , Rfl:     NON FORMULARY, CPAP and 02 gets this from [facility], Disp: , Rfl:     sacubitril-valsartan (Entresto) 24-26 MG tablet, Take 1 tablet by mouth 2 (Two) Times a Day., Disp: 60 tablet, Rfl: 11    spironolactone (ALDACTONE) 25 MG tablet, Take 1 tablet by mouth Daily., Disp: 30 tablet, Rfl: 11    tamsulosin (FLOMAX) 0.4 MG capsule 24 hr capsule, Take 1 capsule by mouth 2 (Two) Times a Day., Disp: , Rfl:     vitamin B-12 (CYANOCOBALAMIN) 1000 MCG tablet, Take 1 tablet by mouth Daily., Disp: , Rfl:     vitamin C (ASCORBIC ACID) 500 MG tablet, Take 1 tablet by mouth Daily., Disp: , Rfl:     warfarin (COUMADIN) 6 MG tablet, Take 1 tablet by mouth Daily. 6 mg every day except Wednesday and it is 9 mg, Disp: , Rfl:     Fluticasone-Umeclidin-Vilant (Trelegy Ellipta) 200-62.5-25 MCG/ACT aerosol powder , 1 puff Daily. (Patient not taking: Reported on 8/14/2023), Disp: , Rfl:        Objective:      Vitals:    08/14/23 1417   BP: 106/88   Pulse: 75   SpO2: 96%   Weight - 175 lbs      Vitals and nursing note reviewed.   Constitutional:       General: Not in acute distress.     Appearance: Not in distress.   Neck:      Vascular: No JVD or JVR. JVD normal.   Pulmonary:      Breath sounds: Normal breath sounds.   Cardiovascular:      Normal rate. Irregularly irregular rhythm.      Murmurs: There is a grade 2/6 systolic murmur.      No gallop.    Edema:     Peripheral edema absent.   Skin:     General: Skin is warm and dry.   Neurological:      Mental Status: Alert, oriented to person, place, and time and oriented to person, place and time.       Lab Review:       Lab Results   Component Value Date    GLUCOSE 100 (H) 06/29/2023    BUN 16 06/29/2023    CREATININE 1.02 06/29/2023    EGFRRESULT 77 03/27/2023    EGFR 72.9 06/29/2023    BCR 15.7 06/29/2023    K 4.4 06/29/2023    CO2 25.0 06/29/2023    CALCIUM 8.7 06/29/2023    PROTENTOTREF 7.1 05/12/2023    ALBUMIN 4.1 06/29/2023    BILITOT 0.7 06/29/2023    AST 20  06/29/2023    ALT 22 06/29/2023      Lab Results   Component Value Date    CHOL 159 09/26/2016    TRIG 121 09/26/2016    HDL 45 09/26/2016    LDL 90 09/26/2016        Lab Results   Component Value Date    HGBA1C 6.10 (H) 02/12/2023            ECG 12 Lead    Date/Time: 8/14/2023 2:46 PM  Performed by: Blank Russo APRN  Authorized by: Blank Russo APRN   Comparison: compared with previous ECG from 6/29/2023  Similar to previous ECG  Comparison to previous ECG: Q waves V1, V2, I, aVL  Rhythm: atrial fibrillation  Ectopy: unifocal PVCs  BPM: 85          Assessment/Plan:     Problem List Items Addressed This Visit (all established)         Cardiac and Vasculature    Essential hypertension (Chronic): Stable/well-controlled    Atrial fibrillation (Chronic): Stable, remains rate controlled    Chronic combined systolic and diastolic CHF (congestive heart failure) - Primary (Chronic): Improving.  Etiology still unclear (could be valvular, versus ischemic-though less likely)    Relevant Orders    Comprehensive Metabolic Panel (Completed)    BNP (Completed)    Nonrheumatic mitral valve regurgitation (Chronic): Stable    Overview     Eccentric (posteriorly directed) and therefore severe              Recommendations/plans:    He continues to do well with gradual titration of nondirected medical therapy for cardiomyopathy.  Continue Farxiga, Aldactone and low-dose Entresto.  He will be kept off of a beta-blocker as previously noted by Dr. Piña due to his ventricular response tending to run in the lower 60s and history of symptomatic bradycardia on beta-blockers in the past.    He has not yet had his repeat BMP after starting Aldactone a few weeks ago.  He will go downstairs and have this completed today.  Pending results, we may uptitrate his Entresto to middle dose with follow-up labs 1 to 2 weeks later.    Repeat echocardiogram later this month.  If ejection fraction has improved significantly, no further testing.  If  ejection fraction is still reduced, will pursue ischemic evaluation and ultimate consideration for valvular intervention as previously outlined by Dr. Piña.    Continue warfarin for anticoagulation for stroke prophylaxis in the setting of permanent atrial fibrillation.  His PCP monitors his PT/INR    Follow-up 6 weeks; we will be in touch sooner pending lab results and echo results    I spent 33 minutes caring for Riley on this date of service. This time includes time spent by me in the following activities: preparing for the visit, reviewing tests, obtaining and/or reviewing a separately obtained history, performing a medically appropriate examination and/or evaluation, counseling and educating the patient/family/caregiver, and documenting information in the medical record

## 2023-08-17 ENCOUNTER — TELEPHONE (OUTPATIENT)
Dept: CARDIOLOGY | Facility: CLINIC | Age: 83
End: 2023-08-17
Payer: MEDICARE

## 2023-08-17 NOTE — TELEPHONE ENCOUNTER
Please see note under result notes.  Patient is contacted and informed.  He will get his repeat labs done in a couple of weeks.

## 2023-08-17 NOTE — TELEPHONE ENCOUNTER
----- Message from OLEG Benítez sent at 8/14/2023  3:55 PM CDT -----  Please let him know I reviewed his labs and everything looks good with the exception of his potassium being mildly elevated at 5.4.  Therefore, no changes at this time but please advise him he should avoid any potassium supplements and foods high in potassium and we should recheck his labs in a couple of weeks.  Please save this note to the chart.  Thank you.

## 2023-08-24 ENCOUNTER — HOSPITAL ENCOUNTER (OUTPATIENT)
Dept: CARDIOLOGY | Facility: HOSPITAL | Age: 83
Discharge: HOME | End: 2023-08-24
Admitting: NURSE PRACTITIONER
Payer: MEDICARE

## 2023-08-24 VITALS
WEIGHT: 175 LBS | SYSTOLIC BLOOD PRESSURE: 106 MMHG | BODY MASS INDEX: 25.05 KG/M2 | HEIGHT: 70 IN | DIASTOLIC BLOOD PRESSURE: 88 MMHG

## 2023-08-24 DIAGNOSIS — I50.42 CHRONIC COMBINED SYSTOLIC AND DIASTOLIC CHF (CONGESTIVE HEART FAILURE): Chronic | ICD-10-CM

## 2023-08-24 DIAGNOSIS — I48.21 PERMANENT ATRIAL FIBRILLATION: Chronic | ICD-10-CM

## 2023-08-24 PROCEDURE — 93306 TTE W/DOPPLER COMPLETE: CPT

## 2023-08-25 LAB
AV VENA CONTRACTA: 0.38 CM
BH CV ECHO MEAS - AO MAX PG: 9.6 MMHG
BH CV ECHO MEAS - AO MEAN PG: 4 MMHG
BH CV ECHO MEAS - AO ROOT DIAM: 3.3 CM
BH CV ECHO MEAS - AO V2 MAX: 155 CM/SEC
BH CV ECHO MEAS - AO V2 VTI: 26.4 CM
BH CV ECHO MEAS - AVA(I,D): 1.63 CM2
BH CV ECHO MEAS - EDV(CUBED): 216 ML
BH CV ECHO MEAS - EDV(MOD-SP2): 129 ML
BH CV ECHO MEAS - EDV(MOD-SP4): 121 ML
BH CV ECHO MEAS - EF(MOD-BP): 37.2 %
BH CV ECHO MEAS - EF(MOD-SP2): 44.7 %
BH CV ECHO MEAS - EF(MOD-SP4): 26.8 %
BH CV ECHO MEAS - ESV(CUBED): 85.2 ML
BH CV ECHO MEAS - ESV(MOD-SP2): 71.4 ML
BH CV ECHO MEAS - ESV(MOD-SP4): 88.6 ML
BH CV ECHO MEAS - FS: 26.7 %
BH CV ECHO MEAS - IVS/LVPW: 1.1 CM
BH CV ECHO MEAS - IVSD: 1.1 CM
BH CV ECHO MEAS - LA DIMENSION: 6.4 CM
BH CV ECHO MEAS - LAT PEAK E' VEL: 9.1 CM/SEC
BH CV ECHO MEAS - LV DIASTOLIC VOL/BSA (35-75): 61.4 CM2
BH CV ECHO MEAS - LV MASS(C)D: 263 GRAMS
BH CV ECHO MEAS - LV MAX PG: 1.69 MMHG
BH CV ECHO MEAS - LV MEAN PG: 1 MMHG
BH CV ECHO MEAS - LV SYSTOLIC VOL/BSA (12-30): 44.9 CM2
BH CV ECHO MEAS - LV V1 MAX: 65 CM/SEC
BH CV ECHO MEAS - LV V1 VTI: 13.7 CM
BH CV ECHO MEAS - LVIDD: 6 CM
BH CV ECHO MEAS - LVIDS: 4.4 CM
BH CV ECHO MEAS - LVOT AREA: 3.1 CM2
BH CV ECHO MEAS - LVOT DIAM: 2 CM
BH CV ECHO MEAS - LVPWD: 1 CM
BH CV ECHO MEAS - MED PEAK E' VEL: 6 CM/SEC
BH CV ECHO MEAS - MR MAX PG: 110.3 MMHG
BH CV ECHO MEAS - MR MAX VEL: 525 CM/SEC
BH CV ECHO MEAS - MR MEAN PG: 74 MMHG
BH CV ECHO MEAS - MR MEAN VEL: 409 CM/SEC
BH CV ECHO MEAS - MR VTI: 174 CM
BH CV ECHO MEAS - MV DEC SLOPE: 518 CM/SEC2
BH CV ECHO MEAS - MV DEC TIME: 0.23 MSEC
BH CV ECHO MEAS - MV E MAX VEL: 117.5 CM/SEC
BH CV ECHO MEAS - MV MAX PG: 8.4 MMHG
BH CV ECHO MEAS - MV MEAN PG: 2 MMHG
BH CV ECHO MEAS - MV V2 VTI: 29.8 CM
BH CV ECHO MEAS - MVA(VTI): 1.44 CM2
BH CV ECHO MEAS - PA V2 MAX: 94.6 CM/SEC
BH CV ECHO MEAS - RAP SYSTOLE: 5 MMHG
BH CV ECHO MEAS - RV MAX PG: 2.23 MMHG
BH CV ECHO MEAS - RV V1 MAX: 74.6 CM/SEC
BH CV ECHO MEAS - RV V1 VTI: 14.3 CM
BH CV ECHO MEAS - RVDD: 3.8 CM
BH CV ECHO MEAS - RVSP: 26 MMHG
BH CV ECHO MEAS - SI(MOD-SP2): 29.2 ML/M2
BH CV ECHO MEAS - SI(MOD-SP4): 16.4 ML/M2
BH CV ECHO MEAS - SV(LVOT): 43 ML
BH CV ECHO MEAS - SV(MOD-SP2): 57.6 ML
BH CV ECHO MEAS - SV(MOD-SP4): 32.4 ML
BH CV ECHO MEAS - TAPSE (>1.6): 1.71 CM
BH CV ECHO MEAS - TR MAX PG: 21 MMHG
BH CV ECHO MEAS - TR MAX VEL: 229 CM/SEC
BH CV ECHO MEASUREMENTS AVERAGE E/E' RATIO: 15.56
BH CV XLRA - TDI S': 8.2 CM/SEC
LEFT ATRIUM VOLUME INDEX: 137.6 ML/M2
LEFT ATRIUM VOLUME: 289 ML
MV VENA CONTRACTA: 0.6 CM

## 2023-09-12 ENCOUNTER — OFFICE VISIT (OUTPATIENT)
Dept: CARDIOLOGY CLINIC | Age: 83
End: 2023-09-12
Payer: MEDICARE

## 2023-09-12 VITALS
HEART RATE: 53 BPM | BODY MASS INDEX: 25.62 KG/M2 | WEIGHT: 179 LBS | DIASTOLIC BLOOD PRESSURE: 68 MMHG | SYSTOLIC BLOOD PRESSURE: 106 MMHG | HEIGHT: 70 IN

## 2023-09-12 DIAGNOSIS — I48.20 CHRONIC ATRIAL FIBRILLATION (HCC): Primary | ICD-10-CM

## 2023-09-12 PROCEDURE — 93000 ELECTROCARDIOGRAM COMPLETE: CPT | Performed by: CLINICAL NURSE SPECIALIST

## 2023-09-12 PROCEDURE — 99214 OFFICE O/P EST MOD 30 MIN: CPT | Performed by: CLINICAL NURSE SPECIALIST

## 2023-09-12 ASSESSMENT — ENCOUNTER SYMPTOMS
NAUSEA: 0
EYE REDNESS: 0
FACIAL SWELLING: 0
VOMITING: 0
COUGH: 0
SHORTNESS OF BREATH: 1
WHEEZING: 0
ABDOMINAL PAIN: 0
CHEST TIGHTNESS: 0

## 2023-09-12 NOTE — PROGRESS NOTES
[facility], [address] [place] [address]  Phone: [phone]  Fax: [phone]    OFFICE VISIT:  2023    Shaun Newman - : 1940    Reason For Visit:  Clarice Damon is a 79 y.o. male who is here for Follow-up       Diagnosis Orders   1. Chronic atrial fibrillation (HCC)  EKG 12 lead              HPI  Pt with a history of dyslipidemia, hypertension, sleep apnea, past tobacco abuse/COPD, chronic atrial fibrillation/Coumadin therapy, systolic heart failure, mitral regurgitation considered to be 2+ via [facility] on 2022, and aortic insufficiency      He has some dyspnea that is chronic, but has recently been treated for pneumonia within the past few weeks and is still recovering. No complaints of chest pain, orthopnea, PND, edema, palpitations, or sudden weight gain       Concepción Gutierrez MD is PCP.   Shaun Lakerow has the following history as recorded in [facility]:    Patient Active Problem List    Diagnosis Date Noted    COVID-19 2022    Postoperative anemia 2022    Primary osteoarthritis of left knee 2022    Chronic pain of left knee 2022    Osteoarthritis of left knee 2022    Tendinosis of left knee 2022    Effusion of left knee joint 2022    SADE (obstructive sleep apnea) 2018    Peripheral neuropathy 2018    PUD (peptic ulcer disease) 2018    BPH (benign prostatic hyperplasia) 2018    History of tobacco abuse 2018    Mild aortic insufficiency 2017    Mild mitral regurgitation by prior echocardiogram 2017    Essential hypertension 2017    Chronic anticoagulation 2017    Rheumatic fever     Chronic atrial fibrillation (HCC)     COPD (chronic obstructive pulmonary disease) ([address])      Past Medical History:   Diagnosis Date    Arthritis     Bladder cancer ([address])     BCG tx    Chronic atrial fibrillation (HCC)     Chronic cough     COPD (chronic obstructive pulmonary

## 2023-09-12 NOTE — PROGRESS NOTES
Patient is now being seen at [facility] cardiology and did not cancel his further appointments at our office. This was confirmed by his granddaughter who oversees his care.   Further appointments at [facility] cardiology will be canceled

## 2023-09-20 ENCOUNTER — LAB (OUTPATIENT)
Dept: LAB | Facility: HOSPITAL | Age: 83
End: 2023-09-20
Payer: MEDICARE

## 2023-09-20 ENCOUNTER — OFFICE VISIT (OUTPATIENT)
Dept: CARDIOLOGY | Facility: CLINIC | Age: 83
End: 2023-09-20
Payer: MEDICARE

## 2023-09-20 VITALS
HEART RATE: 77 BPM | WEIGHT: 180 LBS | DIASTOLIC BLOOD PRESSURE: 70 MMHG | SYSTOLIC BLOOD PRESSURE: 120 MMHG | HEIGHT: 70 IN | BODY MASS INDEX: 25.77 KG/M2 | OXYGEN SATURATION: 98 %

## 2023-09-20 DIAGNOSIS — E87.5 HYPERKALEMIA: ICD-10-CM

## 2023-09-20 DIAGNOSIS — I48.21 PERMANENT ATRIAL FIBRILLATION: Chronic | ICD-10-CM

## 2023-09-20 DIAGNOSIS — I50.42 CHRONIC COMBINED SYSTOLIC AND DIASTOLIC CHF (CONGESTIVE HEART FAILURE): Chronic | ICD-10-CM

## 2023-09-20 DIAGNOSIS — I50.42 CHRONIC COMBINED SYSTOLIC AND DIASTOLIC CHF (CONGESTIVE HEART FAILURE): Primary | Chronic | ICD-10-CM

## 2023-09-20 DIAGNOSIS — I34.0 NONRHEUMATIC MITRAL VALVE REGURGITATION: Chronic | ICD-10-CM

## 2023-09-20 DIAGNOSIS — I10 ESSENTIAL HYPERTENSION: Chronic | ICD-10-CM

## 2023-09-20 LAB
ANION GAP SERPL CALCULATED.3IONS-SCNC: 9 MMOL/L (ref 5–15)
BUN SERPL-MCNC: 19 MG/DL (ref 8–23)
BUN/CREAT SERPL: 19.6 (ref 7–25)
CALCIUM SPEC-SCNC: 9.5 MG/DL (ref 8.6–10.5)
CHLORIDE SERPL-SCNC: 103 MMOL/L (ref 98–107)
CO2 SERPL-SCNC: 26 MMOL/L (ref 22–29)
CREAT SERPL-MCNC: 0.97 MG/DL (ref 0.76–1.27)
EGFRCR SERPLBLD CKD-EPI 2021: 77.5 ML/MIN/1.73
GLUCOSE SERPL-MCNC: 97 MG/DL (ref 65–99)
NT-PROBNP SERPL-MCNC: 288.1 PG/ML (ref 0–1800)
POTASSIUM SERPL-SCNC: 4.6 MMOL/L (ref 3.5–5.2)
SODIUM SERPL-SCNC: 138 MMOL/L (ref 136–145)

## 2023-09-20 PROCEDURE — 36415 COLL VENOUS BLD VENIPUNCTURE: CPT

## 2023-09-20 PROCEDURE — 80048 BASIC METABOLIC PNL TOTAL CA: CPT

## 2023-09-20 PROCEDURE — 83880 ASSAY OF NATRIURETIC PEPTIDE: CPT

## 2023-09-20 NOTE — H&P (VIEW-ONLY)
Subjective:     Encounter Date:09/20/2023      Patient ID: Riley Foote is a 82 y.o. male.    Chief Complaint: Follow-up mitral regurgitation, chronic systolic heart failure, and atrial fibrillation    History of Present Illness    Mr. Foote was last seen here on 08/14/2023 by OLEG Eddy. We have been working on initiating, and adjusting guideline directed medical therapy for chronic systolic heart failure. He was noted to have severe mitral regurgitation, prompting his initial referral to us. We have got him on Farxiga, Aldactone, and low-dose Entresto, but had refrained from beta blockers because he has noted bradycardia on these in the past. When he saw OLEG Eddy, she ordered a repeat echocardiogram, and repeated labs. The echocardiogram unfortunately did not show any improvement in his LV function, though clinically, he has responded wonderfully. He returns today to discuss further work-up.    The patient comes to the clinic today accompanied by an adult female who contributes to his history. He reports that he is doing well. He denies any changes in how he has been feeling. He reports that he has been golfing all summer. The patient denies feeling slowed up or limited by anything. The adult female reports that she has noticed that he occasionally has to catch his balance.    The patient denies having heart catheterization in the past. The adult female reports that he had a MAURISIO in 05/2022 at [facility] with Dr. Booth.    He denies chest pain, tightness, or pressure. He reports that he can tell when he is in atrial fibrillation, but he is not bothered by it.      The following portions of the patient's history were reviewed and updated as appropriate: allergies, current medications, past family history, past medical history, past social history, past surgical history, and problem list.    Review of Systems   Constitutional: Negative for malaise/fatigue.   Cardiovascular:  Negative for chest pain,  claudication, dyspnea on exertion, leg swelling, near-syncope, orthopnea, palpitations, paroxysmal nocturnal dyspnea and syncope.   Respiratory:  Negative for shortness of breath.    Hematologic/Lymphatic: Does not bruise/bleed easily.         Current Outpatient Medications:     albuterol (PROVENTIL) (2.5 MG/3ML) 0.083% nebulizer solution, Take 2.5 mg by nebulization Every 4 (Four) Hours As Needed for Wheezing., Disp: , Rfl:     Apoaequorin (PREVAGEN PO), Take  by mouth., Disp: , Rfl:     aspirin 81 MG chewable tablet, Chew 1 tablet Daily., Disp: , Rfl:     azelastine (ASTELIN) 0.1 % nasal spray, 2 sprays into the nostril(s) as directed by provider 2 (Two) Times a Day. Use in each nostril as directed, Disp: , Rfl:     dapagliflozin Propanediol (Farxiga) 10 MG tablet, Take 10 mg by mouth Daily., Disp: 30 tablet, Rfl: 11    esomeprazole (nexIUM) 40 MG capsule, Take 1 capsule by mouth Every Morning Before Breakfast., Disp: , Rfl:     FLUoxetine (PROzac) 20 MG capsule, Take 1 capsule by mouth Daily., Disp: , Rfl:     fluticasone (FLONASE) 50 MCG/ACT nasal spray, 2 sprays into the nostril(s) as directed by provider Daily., Disp: , Rfl:     Fluticasone-Umeclidin-Vilant (Trelegy Ellipta) 200-62.5-25 MCG/ACT aerosol powder , 1 puff Daily., Disp: , Rfl:     gabapentin (NEURONTIN) 300 MG capsule, Take 2 capsules by mouth Daily. At bedtime, Disp: , Rfl:     guaiFENesin (MUCINEX) 600 MG 12 hr tablet, Take 2 tablets by mouth 2 (Two) Times a Day., Disp: , Rfl:     Misc Natural Products (OSTEO BI-FLEX ADV JOINT SHIELD PO), Take 1 tablet by mouth Daily., Disp: , Rfl:     Multiple Vitamins-Minerals (CENTRUM SILVER) tablet, 1 tablet Daily., Disp: , Rfl:     NON FORMULARY, CPAP and 02 gets this from [facility], Disp: , Rfl:     sacubitril-valsartan (Entresto) 24-26 MG tablet, Take 1 tablet by mouth 2 (Two) Times a Day., Disp: 60 tablet, Rfl: 11    spironolactone (ALDACTONE) 25 MG tablet, Take 1 tablet by mouth Daily., Disp: 30 tablet,  Rfl: 11    tamsulosin (FLOMAX) 0.4 MG capsule 24 hr capsule, Take 1 capsule by mouth 2 (Two) Times a Day., Disp: , Rfl:     vitamin B-12 (CYANOCOBALAMIN) 1000 MCG tablet, Take 1 tablet by mouth Daily., Disp: , Rfl:     vitamin C (ASCORBIC ACID) 500 MG tablet, Take 1 tablet by mouth Daily., Disp: , Rfl:     warfarin (COUMADIN) 6 MG tablet, Take 1 tablet by mouth Daily. 6 mg every day except Wednesday and it is 9 mg, Disp: , Rfl:        Objective:      Vitals:    09/20/23 1549   BP: 120/70   Pulse: 77   SpO2: 98%     Vitals and nursing note reviewed.   Constitutional:       General: Not in acute distress.     Appearance: Not in distress.   Neck:      Vascular: No JVD or JVR. JVD normal.   Pulmonary:      Effort: Pulmonary effort is normal.      Breath sounds: Normal breath sounds.   Cardiovascular:      Normal rate. Irregularly irregular rhythm.      Murmurs: There is a grade 3/6 high frequency blowing holosystolic murmur at the apex.      No gallop.  No rub.   Pulses:     Intact distal pulses.   Edema:     Peripheral edema absent.   Skin:     General: Skin is warm and dry.   Neurological:      Mental Status: Alert, oriented to person, place, and time and oriented to person, place and time.     Lab Review:         ECG 12 Lead    Date/Time: 9/20/2023 4:01 PM  Performed by: Kannan Piña MD  Authorized by: Kannan Piña MD   Comparison: compared with previous ECG from 8/14/2023  Similar to previous ECG  Rhythm: atrial fibrillation  Rhythm comments: premature ventricular or aberrantly conducted complexes  Conduction: left anterior fascicular block  Other findings: non-specific ST-T wave changes    Clinical impression: abnormal EKG        Results for orders placed during the hospital encounter of 08/24/23    Adult Transthoracic Echo Complete w/ Color, Spectral and Contrast if necessary per protocol    Interpretation Summary    Left ventricular systolic function is moderately decreased. Left ventricular ejection  fraction appears to be 36 - 40%.    The left ventricular cavity is mildly dilated (LVIDd 6.0cm).    Severe mitral valve regurgitation is present with a posteriorly-directed jet noted.    Moderate aortic valve regurgitation is present.    Estimated right ventricular systolic pressure from tricuspid regurgitation is normal (<35 mmHg).    Normal size right ventricle, with mildly reduced systolic function noted.    Compared to prior exam from 3/10/2023, no significant change.  The mitral regurgitation, by definition, severe due to its eccentric nature (posteriorly directed).        Assessment/Plan:     Problem List Items Addressed This Visit (all established, stable)         Cardiac and Vasculature    Essential hypertension (Chronic)    Atrial fibrillation (Chronic)    Chronic combined systolic and diastolic CHF (congestive heart failure) - Primary (Chronic)    Relevant Orders    BNP (Completed)    Basic Metabolic Panel    Nonrheumatic mitral valve regurgitation (Chronic)    Overview     Eccentric (posteriorly directed) and therefore severe              Recommendations/plans:    Mr. Foote continues to do exceptionally well given his LV systolic dysfunction, and severe mitral regurgitation. He has tolerated the implementation of 3 classes of guideline directed therapies, though he is still only on low-dose Entresto, and starting dose spironolactone because potassium was elevated at last lab check. The lab appears to have been hemolyzed, so we will start by having him recheck those today on his way out. If, in fact, his potassium is normal, and kidney function still looks okay, then we will consider increasing Entresto to the moderate dose.     Since his EF has not recovered yet though, I do think we must pursue an ischemic evaluation, and we will make plans to do a left, and right heart catheterization the second or third week in 10/2023.    Otherwise, no changes in therapy recommended at this time, and follow-up will be  after the cardiac catheterization.      Transcribed from ambient dictation for Kannan Piña MD by Temo Jasmine.  09/20/23   17:53 CDT    Patient or patient representative verbalized consent to the visit recording.

## 2023-09-20 NOTE — PROGRESS NOTES
Subjective:     Encounter Date:09/20/2023      Patient ID: Riley Foote is a 82 y.o. male.    Chief Complaint: Follow-up mitral regurgitation, chronic systolic heart failure, and atrial fibrillation    History of Present Illness    Mr. Foote was last seen here on 08/14/2023 by OLEG Eddy. We have been working on initiating, and adjusting guideline directed medical therapy for chronic systolic heart failure. He was noted to have severe mitral regurgitation, prompting his initial referral to us. We have got him on Farxiga, Aldactone, and low-dose Entresto, but had refrained from beta blockers because he has noted bradycardia on these in the past. When he saw OLEG Eddy, she ordered a repeat echocardiogram, and repeated labs. The echocardiogram unfortunately did not show any improvement in his LV function, though clinically, he has responded wonderfully. He returns today to discuss further work-up.    The patient comes to the clinic today accompanied by an adult female who contributes to his history. He reports that he is doing well. He denies any changes in how he has been feeling. He reports that he has been golfing all summer. The patient denies feeling slowed up or limited by anything. The adult female reports that she has noticed that he occasionally has to catch his balance.    The patient denies having heart catheterization in the past. The adult female reports that he had a MAURISIO in 05/2022 at [facility] with Dr. Booth.    He denies chest pain, tightness, or pressure. He reports that he can tell when he is in atrial fibrillation, but he is not bothered by it.      The following portions of the patient's history were reviewed and updated as appropriate: allergies, current medications, past family history, past medical history, past social history, past surgical history, and problem list.    Review of Systems   Constitutional: Negative for malaise/fatigue.   Cardiovascular:  Negative for chest pain,  claudication, dyspnea on exertion, leg swelling, near-syncope, orthopnea, palpitations, paroxysmal nocturnal dyspnea and syncope.   Respiratory:  Negative for shortness of breath.    Hematologic/Lymphatic: Does not bruise/bleed easily.         Current Outpatient Medications:     albuterol (PROVENTIL) (2.5 MG/3ML) 0.083% nebulizer solution, Take 2.5 mg by nebulization Every 4 (Four) Hours As Needed for Wheezing., Disp: , Rfl:     Apoaequorin (PREVAGEN PO), Take  by mouth., Disp: , Rfl:     aspirin 81 MG chewable tablet, Chew 1 tablet Daily., Disp: , Rfl:     azelastine (ASTELIN) 0.1 % nasal spray, 2 sprays into the nostril(s) as directed by provider 2 (Two) Times a Day. Use in each nostril as directed, Disp: , Rfl:     dapagliflozin Propanediol (Farxiga) 10 MG tablet, Take 10 mg by mouth Daily., Disp: 30 tablet, Rfl: 11    esomeprazole (nexIUM) 40 MG capsule, Take 1 capsule by mouth Every Morning Before Breakfast., Disp: , Rfl:     FLUoxetine (PROzac) 20 MG capsule, Take 1 capsule by mouth Daily., Disp: , Rfl:     fluticasone (FLONASE) 50 MCG/ACT nasal spray, 2 sprays into the nostril(s) as directed by provider Daily., Disp: , Rfl:     Fluticasone-Umeclidin-Vilant (Trelegy Ellipta) 200-62.5-25 MCG/ACT aerosol powder , 1 puff Daily., Disp: , Rfl:     gabapentin (NEURONTIN) 300 MG capsule, Take 2 capsules by mouth Daily. At bedtime, Disp: , Rfl:     guaiFENesin (MUCINEX) 600 MG 12 hr tablet, Take 2 tablets by mouth 2 (Two) Times a Day., Disp: , Rfl:     Misc Natural Products (OSTEO BI-FLEX ADV JOINT SHIELD PO), Take 1 tablet by mouth Daily., Disp: , Rfl:     Multiple Vitamins-Minerals (CENTRUM SILVER) tablet, 1 tablet Daily., Disp: , Rfl:     NON FORMULARY, CPAP and 02 gets this from [facility], Disp: , Rfl:     sacubitril-valsartan (Entresto) 24-26 MG tablet, Take 1 tablet by mouth 2 (Two) Times a Day., Disp: 60 tablet, Rfl: 11    spironolactone (ALDACTONE) 25 MG tablet, Take 1 tablet by mouth Daily., Disp: 30 tablet,  Rfl: 11    tamsulosin (FLOMAX) 0.4 MG capsule 24 hr capsule, Take 1 capsule by mouth 2 (Two) Times a Day., Disp: , Rfl:     vitamin B-12 (CYANOCOBALAMIN) 1000 MCG tablet, Take 1 tablet by mouth Daily., Disp: , Rfl:     vitamin C (ASCORBIC ACID) 500 MG tablet, Take 1 tablet by mouth Daily., Disp: , Rfl:     warfarin (COUMADIN) 6 MG tablet, Take 1 tablet by mouth Daily. 6 mg every day except Wednesday and it is 9 mg, Disp: , Rfl:        Objective:      Vitals:    09/20/23 1549   BP: 120/70   Pulse: 77   SpO2: 98%     Vitals and nursing note reviewed.   Constitutional:       General: Not in acute distress.     Appearance: Not in distress.   Neck:      Vascular: No JVD or JVR. JVD normal.   Pulmonary:      Effort: Pulmonary effort is normal.      Breath sounds: Normal breath sounds.   Cardiovascular:      Normal rate. Irregularly irregular rhythm.      Murmurs: There is a grade 3/6 high frequency blowing holosystolic murmur at the apex.      No gallop.  No rub.   Pulses:     Intact distal pulses.   Edema:     Peripheral edema absent.   Skin:     General: Skin is warm and dry.   Neurological:      Mental Status: Alert, oriented to person, place, and time and oriented to person, place and time.     Lab Review:         ECG 12 Lead    Date/Time: 9/20/2023 4:01 PM  Performed by: Kannan Piña MD  Authorized by: Kannan Piña MD   Comparison: compared with previous ECG from 8/14/2023  Similar to previous ECG  Rhythm: atrial fibrillation  Rhythm comments: premature ventricular or aberrantly conducted complexes  Conduction: left anterior fascicular block  Other findings: non-specific ST-T wave changes    Clinical impression: abnormal EKG        Results for orders placed during the hospital encounter of 08/24/23    Adult Transthoracic Echo Complete w/ Color, Spectral and Contrast if necessary per protocol    Interpretation Summary    Left ventricular systolic function is moderately decreased. Left ventricular ejection  fraction appears to be 36 - 40%.    The left ventricular cavity is mildly dilated (LVIDd 6.0cm).    Severe mitral valve regurgitation is present with a posteriorly-directed jet noted.    Moderate aortic valve regurgitation is present.    Estimated right ventricular systolic pressure from tricuspid regurgitation is normal (<35 mmHg).    Normal size right ventricle, with mildly reduced systolic function noted.    Compared to prior exam from 3/10/2023, no significant change.  The mitral regurgitation, by definition, severe due to its eccentric nature (posteriorly directed).        Assessment/Plan:     Problem List Items Addressed This Visit (all established, stable)         Cardiac and Vasculature    Essential hypertension (Chronic)    Atrial fibrillation (Chronic)    Chronic combined systolic and diastolic CHF (congestive heart failure) - Primary (Chronic)    Relevant Orders    BNP (Completed)    Basic Metabolic Panel    Nonrheumatic mitral valve regurgitation (Chronic)    Overview     Eccentric (posteriorly directed) and therefore severe              Recommendations/plans:    Mr. Foote continues to do exceptionally well given his LV systolic dysfunction, and severe mitral regurgitation. He has tolerated the implementation of 3 classes of guideline directed therapies, though he is still only on low-dose Entresto, and starting dose spironolactone because potassium was elevated at last lab check. The lab appears to have been hemolyzed, so we will start by having him recheck those today on his way out. If, in fact, his potassium is normal, and kidney function still looks okay, then we will consider increasing Entresto to the moderate dose.     Since his EF has not recovered yet though, I do think we must pursue an ischemic evaluation, and we will make plans to do a left, and right heart catheterization the second or third week in 10/2023.    Otherwise, no changes in therapy recommended at this time, and follow-up will be  after the cardiac catheterization.      Transcribed from ambient dictation for Kannan Piña MD by Temo Jasmine.  09/20/23   17:53 CDT    Patient or patient representative verbalized consent to the visit recording.

## 2023-09-22 ENCOUNTER — PREP FOR SURGERY (OUTPATIENT)
Dept: OTHER | Facility: HOSPITAL | Age: 83
End: 2023-09-22
Payer: MEDICARE

## 2023-09-22 DIAGNOSIS — I34.0 NONRHEUMATIC MITRAL VALVE REGURGITATION: Primary | Chronic | ICD-10-CM

## 2023-09-22 DIAGNOSIS — I50.42 CHRONIC COMBINED SYSTOLIC AND DIASTOLIC CHF (CONGESTIVE HEART FAILURE): Chronic | ICD-10-CM

## 2023-09-22 RX ORDER — SODIUM CHLORIDE 0.9 % (FLUSH) 0.9 %
10 SYRINGE (ML) INJECTION AS NEEDED
OUTPATIENT
Start: 2023-09-22

## 2023-09-22 RX ORDER — SODIUM CHLORIDE 9 MG/ML
40 INJECTION, SOLUTION INTRAVENOUS AS NEEDED
OUTPATIENT
Start: 2023-09-22

## 2023-09-22 RX ORDER — ASPIRIN 81 MG/1
324 TABLET, CHEWABLE ORAL ONCE
OUTPATIENT
Start: 2023-09-22 | End: 2023-09-22

## 2023-09-22 RX ORDER — ASPIRIN 81 MG/1
81 TABLET ORAL DAILY
OUTPATIENT
Start: 2023-09-23

## 2023-09-22 RX ORDER — SODIUM CHLORIDE 0.9 % (FLUSH) 0.9 %
3 SYRINGE (ML) INJECTION EVERY 12 HOURS SCHEDULED
OUTPATIENT
Start: 2023-09-22

## 2023-09-24 RX ORDER — SACUBITRIL AND VALSARTAN 49; 51 MG/1; MG/1
1 TABLET, FILM COATED ORAL 2 TIMES DAILY
Qty: 60 TABLET | Refills: 11 | Status: SHIPPED | OUTPATIENT
Start: 2023-09-24

## 2023-10-11 ENCOUNTER — HOSPITAL ENCOUNTER (OUTPATIENT)
Facility: HOSPITAL | Age: 83
Setting detail: HOSPITAL OUTPATIENT SURGERY
Discharge: HOME | End: 2023-10-11
Attending: INTERNAL MEDICINE | Admitting: INTERNAL MEDICINE
Payer: MEDICARE

## 2023-10-11 VITALS
SYSTOLIC BLOOD PRESSURE: 124 MMHG | HEART RATE: 58 BPM | RESPIRATION RATE: 18 BRPM | DIASTOLIC BLOOD PRESSURE: 72 MMHG | WEIGHT: 181 LBS | BODY MASS INDEX: 25.91 KG/M2 | TEMPERATURE: 97.1 F | OXYGEN SATURATION: 99 % | HEIGHT: 70 IN

## 2023-10-11 DIAGNOSIS — I50.42 CHRONIC COMBINED SYSTOLIC AND DIASTOLIC CHF (CONGESTIVE HEART FAILURE): ICD-10-CM

## 2023-10-11 DIAGNOSIS — I34.0 NONRHEUMATIC MITRAL VALVE REGURGITATION: ICD-10-CM

## 2023-10-11 LAB
A-A DO2: 34.4 MMHG
ANION GAP SERPL CALCULATED.3IONS-SCNC: 10 MMOL/L (ref 5–15)
ARTERIAL PATENCY WRIST A: ABNORMAL
ATMOSPHERIC PRESS: 746 MMHG
ATMOSPHERIC PRESS: 746 MMHG
BASE EXCESS BLDA CALC-SCNC: -0.4 MMOL/L (ref 0–2)
BASE EXCESS BLDV CALC-SCNC: 0.7 MMOL/L (ref 0–2)
BDY SITE: ABNORMAL
BDY SITE: NORMAL
BODY TEMPERATURE: 37 C
BODY TEMPERATURE: 37 C
BUN SERPL-MCNC: 18 MG/DL (ref 8–23)
BUN/CREAT SERPL: 18.6 (ref 7–25)
CALCIUM SPEC-SCNC: 9.6 MG/DL (ref 8.6–10.5)
CHLORIDE SERPL-SCNC: 104 MMOL/L (ref 98–107)
CO2 SERPL-SCNC: 24 MMOL/L (ref 22–29)
COHGB MFR BLD: 0.8 % (ref 0–5)
COHGB MFR BLD: 1.6 % (ref 0–5)
CREAT SERPL-MCNC: 0.97 MG/DL (ref 0.76–1.27)
DEPRECATED RDW RBC AUTO: 45.8 FL (ref 37–54)
EGFRCR SERPLBLD CKD-EPI 2021: 77.5 ML/MIN/1.73
ERYTHROCYTE [DISTWIDTH] IN BLOOD BY AUTOMATED COUNT: 13.3 % (ref 12.3–15.4)
GLUCOSE SERPL-MCNC: 116 MG/DL (ref 65–99)
HCO3 BLDA-SCNC: 22.9 MMOL/L (ref 20–26)
HCO3 BLDV-SCNC: 26.4 MMOL/L (ref 22–28)
HCT VFR BLD AUTO: 47.6 % (ref 37.5–51)
HCT VFR BLD CALC: 44.4 % (ref 38–51)
HGB BLD-MCNC: 15.3 G/DL (ref 13–17.7)
HGB BLDA-MCNC: 14.5 G/DL (ref 14–18)
HGB BLDA-MCNC: 14.6 G/DL (ref 14–18)
Lab: ABNORMAL
Lab: NORMAL
MCH RBC QN AUTO: 30.1 PG (ref 26.6–33)
MCHC RBC AUTO-ENTMCNC: 32.1 G/DL (ref 31.5–35.7)
MCV RBC AUTO: 93.5 FL (ref 79–97)
METHGB BLD QL: 0.8 % (ref 0–3)
METHGB BLD QL: 1.1 % (ref 0–3)
MODALITY: ABNORMAL
MODALITY: NORMAL
OXYHGB MFR BLDV: 69.6 % (ref 60–85)
OXYHGB MFR BLDV: 93.2 % (ref 94–99)
PCO2 BLDA: 33 MM HG (ref 35–45)
PCO2 BLDV: 45.3 MM HG (ref 41–51)
PCO2 TEMP ADJ BLD: 33 MM HG (ref 35–45)
PH BLDA: 7.45 PH UNITS (ref 7.35–7.45)
PH BLDV: 7.37 PH UNITS (ref 7.32–7.42)
PH, TEMP CORRECTED: 7.45 PH UNITS (ref 7.35–7.45)
PLATELET # BLD AUTO: 153 10*3/MM3 (ref 140–450)
PMV BLD AUTO: 10.2 FL (ref 6–12)
PO2 BLDA: 75.1 MM HG (ref 83–108)
PO2 BLDV: 39.1 MM HG (ref 27–53)
PO2 TEMP ADJ BLD: 75.1 MM HG (ref 83–108)
POTASSIUM BLDA-SCNC: 3.9 MMOL/L (ref 3.5–5.2)
POTASSIUM BLDV-SCNC: 3.9 MMOL/L (ref 3.5–5.2)
POTASSIUM SERPL-SCNC: 4.2 MMOL/L (ref 3.5–5.2)
RBC # BLD AUTO: 5.09 10*6/MM3 (ref 4.14–5.8)
SAO2 % BLDCOA: 94.7 % (ref 94–99)
SAO2 % BLDCOV: 71.5 % (ref 45–75)
SODIUM BLDA-SCNC: 140 MMOL/L (ref 136–145)
SODIUM BLDV-SCNC: 140 MMOL/L (ref 136–145)
SODIUM SERPL-SCNC: 138 MMOL/L (ref 136–145)
VENTILATOR MODE: ABNORMAL
VENTILATOR MODE: NORMAL
WBC NRBC COR # BLD: 7.12 10*3/MM3 (ref 3.4–10.8)

## 2023-10-11 PROCEDURE — C1894 INTRO/SHEATH, NON-LASER: HCPCS | Performed by: INTERNAL MEDICINE

## 2023-10-11 PROCEDURE — 25010000002 FENTANYL CITRATE (PF) 50 MCG/ML SOLUTION: Performed by: INTERNAL MEDICINE

## 2023-10-11 PROCEDURE — 25010000002 HEPARIN (PORCINE) PER 1000 UNITS: Performed by: INTERNAL MEDICINE

## 2023-10-11 PROCEDURE — 25510000001 IOPAMIDOL PER 1 ML: Performed by: INTERNAL MEDICINE

## 2023-10-11 PROCEDURE — 25010000002 HEPARIN (PORCINE) 2000-0.9 UNIT/L-% SOLUTION: Performed by: INTERNAL MEDICINE

## 2023-10-11 PROCEDURE — 36600 WITHDRAWAL OF ARTERIAL BLOOD: CPT

## 2023-10-11 PROCEDURE — 82805 BLOOD GASES W/O2 SATURATION: CPT

## 2023-10-11 PROCEDURE — 25010000002 MIDAZOLAM PER 1 MG: Performed by: INTERNAL MEDICINE

## 2023-10-11 PROCEDURE — 99152 MOD SED SAME PHYS/QHP 5/>YRS: CPT | Performed by: INTERNAL MEDICINE

## 2023-10-11 PROCEDURE — 25810000003 SODIUM CHLORIDE 0.9 % SOLUTION 500 ML FLEX CONT: Performed by: INTERNAL MEDICINE

## 2023-10-11 PROCEDURE — C1751 CATH, INF, PER/CENT/MIDLINE: HCPCS | Performed by: INTERNAL MEDICINE

## 2023-10-11 PROCEDURE — 25010000002 DIPHENHYDRAMINE PER 50 MG: Performed by: INTERNAL MEDICINE

## 2023-10-11 PROCEDURE — 83050 HGB METHEMOGLOBIN QUAN: CPT

## 2023-10-11 PROCEDURE — 82375 ASSAY CARBOXYHB QUANT: CPT

## 2023-10-11 PROCEDURE — 25010000002 HEPARIN (PORCINE) 1000-0.9 UT/500ML-% SOLUTION: Performed by: INTERNAL MEDICINE

## 2023-10-11 PROCEDURE — 99153 MOD SED SAME PHYS/QHP EA: CPT | Performed by: INTERNAL MEDICINE

## 2023-10-11 PROCEDURE — 80048 BASIC METABOLIC PNL TOTAL CA: CPT | Performed by: INTERNAL MEDICINE

## 2023-10-11 PROCEDURE — 85027 COMPLETE CBC AUTOMATED: CPT | Performed by: INTERNAL MEDICINE

## 2023-10-11 PROCEDURE — 82820 HEMOGLOBIN-OXYGEN AFFINITY: CPT

## 2023-10-11 PROCEDURE — 93460 R&L HRT ART/VENTRICLE ANGIO: CPT | Performed by: INTERNAL MEDICINE

## 2023-10-11 RX ORDER — HEPARIN SODIUM 200 [USP'U]/100ML
INJECTION, SOLUTION INTRAVENOUS
Status: DISCONTINUED | OUTPATIENT
Start: 2023-10-11 | End: 2023-10-11 | Stop reason: HOSPADM

## 2023-10-11 RX ORDER — SODIUM CHLORIDE 0.9 % (FLUSH) 0.9 %
10 SYRINGE (ML) INJECTION AS NEEDED
Status: DISCONTINUED | OUTPATIENT
Start: 2023-10-11 | End: 2023-10-11 | Stop reason: HOSPADM

## 2023-10-11 RX ORDER — SODIUM CHLORIDE 9 MG/ML
100 INJECTION, SOLUTION INTRAVENOUS CONTINUOUS
Status: CANCELLED | OUTPATIENT
Start: 2023-10-11 | End: 2023-10-11

## 2023-10-11 RX ORDER — SODIUM CHLORIDE 0.9 % (FLUSH) 0.9 %
3 SYRINGE (ML) INJECTION EVERY 12 HOURS SCHEDULED
Status: DISCONTINUED | OUTPATIENT
Start: 2023-10-11 | End: 2023-10-11 | Stop reason: HOSPADM

## 2023-10-11 RX ORDER — ASPIRIN 81 MG/1
324 TABLET, CHEWABLE ORAL ONCE
Status: COMPLETED | OUTPATIENT
Start: 2023-10-11 | End: 2023-10-11

## 2023-10-11 RX ORDER — ASPIRIN 81 MG/1
81 TABLET ORAL DAILY
Status: DISCONTINUED | OUTPATIENT
Start: 2023-10-12 | End: 2023-10-11 | Stop reason: HOSPADM

## 2023-10-11 RX ORDER — LIDOCAINE HYDROCHLORIDE 20 MG/ML
INJECTION, SOLUTION INFILTRATION; PERINEURAL
Status: DISCONTINUED | OUTPATIENT
Start: 2023-10-11 | End: 2023-10-11 | Stop reason: HOSPADM

## 2023-10-11 RX ORDER — ASPIRIN 81 MG/1
TABLET, CHEWABLE ORAL
Status: COMPLETED
Start: 2023-10-11 | End: 2023-10-11

## 2023-10-11 RX ORDER — SODIUM CHLORIDE 9 MG/ML
40 INJECTION, SOLUTION INTRAVENOUS AS NEEDED
Status: DISCONTINUED | OUTPATIENT
Start: 2023-10-11 | End: 2023-10-11 | Stop reason: HOSPADM

## 2023-10-11 RX ORDER — MIDAZOLAM HYDROCHLORIDE 1 MG/ML
INJECTION INTRAMUSCULAR; INTRAVENOUS
Status: DISCONTINUED | OUTPATIENT
Start: 2023-10-11 | End: 2023-10-11 | Stop reason: HOSPADM

## 2023-10-11 RX ORDER — ASPIRIN 81 MG/1
TABLET, CHEWABLE ORAL
Status: DISCONTINUED
Start: 2023-10-11 | End: 2023-10-11 | Stop reason: HOSPADM

## 2023-10-11 RX ORDER — ACETAMINOPHEN 325 MG/1
650 TABLET ORAL EVERY 4 HOURS PRN
Status: CANCELLED | OUTPATIENT
Start: 2023-10-11

## 2023-10-11 RX ORDER — DIPHENHYDRAMINE HYDROCHLORIDE 50 MG/ML
INJECTION INTRAMUSCULAR; INTRAVENOUS
Status: DISCONTINUED | OUTPATIENT
Start: 2023-10-11 | End: 2023-10-11 | Stop reason: HOSPADM

## 2023-10-11 RX ORDER — NITROGLYCERIN 0.4 MG/1
0.4 TABLET SUBLINGUAL
Status: CANCELLED | OUTPATIENT
Start: 2023-10-11

## 2023-10-11 RX ORDER — VERAPAMIL HYDROCHLORIDE 2.5 MG/ML
INJECTION, SOLUTION INTRAVENOUS
Status: DISCONTINUED | OUTPATIENT
Start: 2023-10-11 | End: 2023-10-11 | Stop reason: HOSPADM

## 2023-10-11 RX ORDER — HEPARIN SODIUM 1000 [USP'U]/ML
INJECTION, SOLUTION INTRAVENOUS; SUBCUTANEOUS
Status: DISCONTINUED | OUTPATIENT
Start: 2023-10-11 | End: 2023-10-11 | Stop reason: HOSPADM

## 2023-10-11 RX ORDER — FENTANYL CITRATE 50 UG/ML
INJECTION, SOLUTION INTRAMUSCULAR; INTRAVENOUS
Status: DISCONTINUED | OUTPATIENT
Start: 2023-10-11 | End: 2023-10-11 | Stop reason: HOSPADM

## 2023-10-11 RX ADMIN — ASPIRIN 324 MG: 81 TABLET, CHEWABLE ORAL at 08:21

## 2023-10-11 RX ADMIN — SODIUM CHLORIDE 40 ML: 9 INJECTION, SOLUTION INTRAVENOUS at 08:24

## 2023-10-11 NOTE — INTERVAL H&P NOTE
H&P reviewed. The patient was examined and there are no changes to the H&P.      I discussed cardiac catheterization, the procedure, risks (including bleeding, infection, vascular damage [including minor oozing, bruising, bleeding, and up to and including the need for vascular surgery], contrast reaction, renal failure, respiratory failure, heart attack, stroke, arrhythmia and even death), benefits, and alternatives and the patient has voiced understanding and is willing to proceed.

## 2023-10-11 NOTE — Clinical Note
A 7 fr sheath was  inserted with ultrasound guidance into the right subclavian artery. Sheath insertion not delayed.

## 2023-10-11 NOTE — Clinical Note
Hemostasis started on the right radial artery. R-Band was used in achieving hemostasis. Radial compression device applied to vessel. Hemostasis achieved successfully. Closure device additional comment: 14

## 2023-10-11 NOTE — Clinical Note
Prepped: right groin, right neck and Right Wrist. Prepped with: ChloraPrep. The patient was draped in a sterile fashion.

## 2023-10-11 NOTE — Clinical Note
Manual pressure applied to vessel. Manual pressure was held by KM. Manual pressure was held for 15 min. Hemostasis achieved successfully.

## 2023-10-12 PROBLEM — I50.22 CHRONIC HFREF (HEART FAILURE WITH REDUCED EJECTION FRACTION): Status: ACTIVE | Noted: 2023-10-12

## 2023-10-16 NOTE — PROGRESS NOTES
" Ilene Oliveros, OLEG  [facility]  [address]            [place] [address]  Phone: [phone]  Fax: [phone]          Chief Complaint  Pneumonia    Subjective    History of Present Illness  Riley Foote presents to [facility] for appointment.  The patient has known bronchiectasis (CPT, mucinex), chronic respiratory failure with hypoxia-nocturnal O2, GGO, SIMÓN, and emphysema.  The patient is using Trelegy, albuterol HFA/neb as needed. He follows with Dr. Piña for cardiology services. He reports recent heart cath 10/12/23. Patient states he tolerate procedure well. Results reviewed as noted below. He has no new pulmonary complaints today. He states that he received the RSV vaccine 2 weeks ago. He will receive flu vaccine today. Will obtain f/u CT chest to ensure the LLL PNA has completely resolved. No increasing shortness of breath, no fever, no chills, no cough with purulent sputum.     Objective   Vital Signs:   /68   Pulse 64   Ht 177.8 cm (70\")   Wt 81.6 kg (179 lb 12.8 oz)   SpO2 94% Comment: RA  BMI 25.80 kg/m²     Physical Exam  Vitals reviewed.   Constitutional:       General: He is not in acute distress.     Appearance: He is well-developed and overweight.   HENT:      Head: Normocephalic and atraumatic.   Eyes:      General: No scleral icterus.     Conjunctiva/sclera: Conjunctivae normal.      Pupils: Pupils are equal, round, and reactive to light.   Cardiovascular:      Rate and Rhythm: Normal rate.   Pulmonary:      Effort: Pulmonary effort is normal. No respiratory distress.      Breath sounds: Normal breath sounds. No wheezing or rales.   Musculoskeletal:         General: Normal range of motion.      Cervical back: Normal range of motion and neck supple.   Skin:     General: Skin is warm and dry.   Neurological:      Mental Status: He is alert and oriented to person, place, and time.   Psychiatric:         " Behavior: Behavior normal.         Thought Content: Thought content normal.         Judgment: Judgment normal.        Result Review :  The following data was reviewed by: OLEG Bermeo on 10/17/2023:    Cardiac Catheterization/Vascular Study (10/11/2023 11:53)   Impression:  1. Near normal coronary arteries  2. Moderate LV global systolic dysfunction, with LVEF calculated at 38.4% based on left ventriculography  3. Severe mitral regurgitation on left ventriculography (4+) with severely dilated left atrium  4. Normal right-left-sided filling pressures, with normal cardiac output and normal pulmonary vascular resistance     Plan:   -Continue guideline directed medical therapy for systolic heart failure. Today's findings suggest patient is well compensated with normal cardiac output despite depressed LVEF, which is likely secondary to longstanding (unfortunately, previously unrecognized by prior providers) mitral regurgitation.  -We will review his case with structural heart committee, but do not anticipate imminent need for valvular intervention     CT Chest Without Contrast Diagnostic (06/12/2023 15:47)   Summary:  1. Almost complete resolution of left lower lobe pneumonia.  2. Underlying chronic lung changes.  Rest/Exercise Pulse Ox Values          3/27/2023    13:30   Rest/Exercise Pulse Ox Results   Rest room air SAT % 98   Exercise room air SAT % 98         Results for orders placed during the hospital encounter of 04/11/23    Pulmonary Function Test    [facility] - Pulmonary Function Test    [address] [place] [address]  [id number]  [phone]    Patient : Riley Foote  MRN : [id number]  CSN : [id number]  Pulmonologist : Antoine Mayes MD  Date : 4/11/2023    ______________________________________________________________________    Interpretation :  1.  Spirometry is within normal limits and in fact midflows are supranormal.  2.  Lung volumes reveal a decrease in expiratory  reserve volume with a coexisting increase in inspiratory capacity and otherwise are within normal limits.  3.  There is a mild diffusion impairment even when corrected for alveolar volume.      Antoine Mayes MD           Assessment and Plan  Diagnoses and all orders for this visit:    1. Bronchiectasis without complication (Primary)  Overview:  CT chest 2/15/23 - Similar bronchiectasis with peribronchial thickening.    3/27/23 elevated IgM, decreased IgG subclass 2  3/27/23 IgA, IgE, IgG, IgG subclass 1, IgG subclass 3, IgG subclass 4 WNL  5/12/23 VICK, PE, FLC, Serum - no M spike, Kappa/Lambda ratio WNL     Trelegy, albuterol neb    CPT, Mucinex    Assessment & Plan:  Continue current treatment regimen.      Orders:  -     albuterol (PROVENTIL) (2.5 MG/3ML) 0.083% nebulizer solution; Take 2.5 mg by nebulization Every 4 (Four) Hours As Needed for Wheezing or Shortness of Air.  Dispense: 120 each; Refill: 11    2. Allergic rhinitis, unspecified seasonality, unspecified trigger  Overview:  Astelin, Flonase    Assessment & Plan:  Continue current treatment regimen.        3. Pneumonia due to infectious organism, unspecified laterality, unspecified part of lung  Overview:  CT chest 2/15/23 - Persistent patchy left lower lobe opacities with new scattered groundglass opacities with bilateral upper and right lower lobes worrisome for multifocal pneumonia. Similar bronchiectasis with peribronchial thickening. Reactive appearing mediastinal and possibly left hilar lymph node nodes.    CXR 3/9/23 - Previously identified bibasilar pulmonary infiltrates (left greater than right) are much improved on today's exam. No new or worsening infiltrate.    CT chest 6/12/23 - Almost complete resolution of left lower lobe pneumonia.    Assessment & Plan:  Obtain f/u CT chest to ensure complete resolution.     Orders:  -     CT Chest Without Contrast; Future    4. Need for immunization against influenza  Comments:  flu vaccine  given  Orders:  -     Fluzone High-Dose 65+yrs    5. Chronic respiratory failure with hypoxia  Overview:  Noc O2 2L    Assessment & Plan:  Continue chronic oxygen therapy.  The patient is benefiting from it and wishes to continue it.        6. Obstructive sleep apnea  Overview:  PAP+2L     Assessment & Plan:  Continue home PAP device. The patient is benefiting from it and wishes to continue it.   Continue chronic oxygen therapy.  The patient is benefiting from it and wishes to continue it.        7. Other emphysema  Overview:  3/27/23 alpha 1 MM    Trelegy-100, albuterol HFA/neb    Assessment & Plan:  Continue current treatment regimen.  Continue to monitor DLCO.               Follow Up  OLEG Bermeo  10/17/2023  16:04 CDT    Return in about 6 months (around 4/17/2024) for FVL+DLCO.    Patient was given instructions and counseling regarding his condition or for health maintenance advice. Please see specific information pulled into the AVS if appropriate.     Please note that portions of this note were completed with a voice recognition program.

## 2023-10-17 ENCOUNTER — OFFICE VISIT (OUTPATIENT)
Dept: PULMONOLOGY | Facility: CLINIC | Age: 83
End: 2023-10-17
Payer: MEDICARE

## 2023-10-17 VITALS
HEART RATE: 64 BPM | OXYGEN SATURATION: 94 % | HEIGHT: 70 IN | BODY MASS INDEX: 25.74 KG/M2 | WEIGHT: 179.8 LBS | SYSTOLIC BLOOD PRESSURE: 112 MMHG | DIASTOLIC BLOOD PRESSURE: 68 MMHG

## 2023-10-17 DIAGNOSIS — J43.8 OTHER EMPHYSEMA: Chronic | ICD-10-CM

## 2023-10-17 DIAGNOSIS — J30.9 ALLERGIC RHINITIS, UNSPECIFIED SEASONALITY, UNSPECIFIED TRIGGER: Chronic | ICD-10-CM

## 2023-10-17 DIAGNOSIS — J96.11 CHRONIC RESPIRATORY FAILURE WITH HYPOXIA: Chronic | ICD-10-CM

## 2023-10-17 DIAGNOSIS — J47.9 BRONCHIECTASIS WITHOUT COMPLICATION: Primary | ICD-10-CM

## 2023-10-17 DIAGNOSIS — Z23 NEED FOR IMMUNIZATION AGAINST INFLUENZA: ICD-10-CM

## 2023-10-17 DIAGNOSIS — J18.9 PNEUMONIA DUE TO INFECTIOUS ORGANISM, UNSPECIFIED LATERALITY, UNSPECIFIED PART OF LUNG: ICD-10-CM

## 2023-10-17 DIAGNOSIS — G47.33 OBSTRUCTIVE SLEEP APNEA: Chronic | ICD-10-CM

## 2023-10-17 PROCEDURE — 1160F RVW MEDS BY RX/DR IN RCRD: CPT | Performed by: NURSE PRACTITIONER

## 2023-10-17 PROCEDURE — 3074F SYST BP LT 130 MM HG: CPT | Performed by: NURSE PRACTITIONER

## 2023-10-17 PROCEDURE — 99214 OFFICE O/P EST MOD 30 MIN: CPT | Performed by: NURSE PRACTITIONER

## 2023-10-17 PROCEDURE — 3078F DIAST BP <80 MM HG: CPT | Performed by: NURSE PRACTITIONER

## 2023-10-17 PROCEDURE — 1159F MED LIST DOCD IN RCRD: CPT | Performed by: NURSE PRACTITIONER

## 2023-10-17 PROCEDURE — G0008 ADMIN INFLUENZA VIRUS VAC: HCPCS | Performed by: NURSE PRACTITIONER

## 2023-10-17 PROCEDURE — 90662 IIV NO PRSV INCREASED AG IM: CPT | Performed by: NURSE PRACTITIONER

## 2023-10-17 RX ORDER — ALBUTEROL SULFATE 2.5 MG/3ML
2.5 SOLUTION RESPIRATORY (INHALATION) EVERY 4 HOURS PRN
Qty: 120 EACH | Refills: 11 | Status: SHIPPED | OUTPATIENT
Start: 2023-10-17

## 2023-10-17 NOTE — ASSESSMENT & PLAN NOTE
Continue home PAP device. The patient is benefiting from it and wishes to continue it.   Continue chronic oxygen therapy.  The patient is benefiting from it and wishes to continue it.

## 2023-10-17 NOTE — PATIENT INSTRUCTIONS
Bronchiectasis    Bronchiectasis is a condition in which the airways in the lungs (bronchi) are damaged and widened. The condition makes it hard for the lungs to get rid of mucus, and it causes mucus to gather in the bronchi. This condition often leads to lung infections, which can make the condition worse.  What are the causes?  You can be born with this condition, or you can develop it later in life. Common causes of this condition include:  Cystic fibrosis.  Repeated lung infections, such as pneumonia or tuberculosis.  An object or other blockage in the lungs.  Breathing in fluid, food, or other objects (aspiration).  A problem with the body's defense system (immune system) and lung structure that is present at birth (congenital).  Sometimes the cause is not known.  What are the signs or symptoms?  Common symptoms of this condition include:  A daily cough that brings up mucus and lasts for more than 3 weeks.  Lung infections that happen often.  Shortness of breath and wheezing.  Weakness and feeling tired (fatigue).  How is this diagnosed?  This condition is diagnosed with tests, such as:  Chest X-rays or CT scans. These are done to check for changes in the lungs.  Breathing tests. These are done to check how well your lungs are working.  A test of a sample of your saliva (sputum culture). This test is done to check for infection.  Blood tests and other tests. These are done to check for related diseases or causes.  How is this treated?  Treatment for this condition depends on the severity of the illness and its cause. Treatment may include:  Medicines that loosen mucus so it can be coughed up (mucolytics).  Medicines that relax the muscles of the bronchi (bronchodilators).  Antibiotic medicines to prevent or treat infection.  Physical therapy to help clear mucus from the lungs. Techniques may include:  Postural drainage. This is when you sit or lie in certain positions so that mucus can drain by gravity.  Chest  percussion. This involves tapping the chest or back with a cupped hand.  Chest vibration. For this therapy, a hand or special equipment vibrates your chest and back.  Surgery to remove the affected part of the lung. This may be done in severe cases.  Follow these instructions at home:  Medicines  Take over-the-counter and prescription medicines only as told by your health care provider.  If you were prescribed an antibiotic medicine, take it as told by your health care provider. Do not stop taking the antibiotic even if you start to feel better.  Avoid taking sedatives and antihistamines unless your health care provider tells you to take them. These medicines tend to thicken the mucus in the lungs.  Managing symptoms  Do breathing exercises or techniques to clear your lungs as told by your health care provider.  Consider using a cold steam vaporizer or humidifier in your room or home to help loosen secretions.  If you have a cough that gets worse at night, try sleeping in a semi-upright position.  General instructions  Get plenty of rest.  Drink enough fluid to keep your urine pale yellow.  Stay inside when pollution and ozone levels are high.  Stay up to date with vaccinations and immunizations.  Avoid cigarette smoke and other lung irritants.  Do not use any products that contain nicotine or tobacco. These products include cigarettes, chewing tobacco, and vaping devices, such as e-cigarettes. If you need help quitting, ask your health care provider.  Keep all follow-up visits. This is important.  Contact a health care provider if:  You cough up more sputum than before and the sputum is yellow or green in color.  You have a fever or chills.  You cannot control your cough and are losing sleep.  Get help right away if:  You cough up blood.  You have chest pain.  You have increasing shortness of breath.  You have pain that gets worse or is not controlled with medicines.  You have a fever and your symptoms suddenly get  worse.  These symptoms may be an emergency. Get help right away. Call 911.  Do not wait to see if the symptoms will go away.  Do not drive yourself to the hospital.  Summary  Bronchiectasis is a condition in which the airways in the lungs (bronchi) are damaged and widened. The condition makes it hard for the lungs to get rid of mucus, and it causes mucus to gather in the bronchi.  Treatment usually includes therapy to help clear mucus from the lungs.  Avoid cigarette smoke and other lung irritants.  Stay up to date with vaccinations and immunizations.  This information is not intended to replace advice given to you by your health care provider. Make sure you discuss any questions you have with your health care provider.  Document Revised: 08/31/2022 Document Reviewed: 07/19/2022  Elsevier Patient Education © 2023 [employer]

## 2023-11-02 ENCOUNTER — HOSPITAL ENCOUNTER (OUTPATIENT)
Dept: CT IMAGING | Facility: HOSPITAL | Age: 83
Discharge: HOME | End: 2023-11-02
Admitting: NURSE PRACTITIONER
Payer: MEDICARE

## 2023-11-02 DIAGNOSIS — J18.9 PNEUMONIA DUE TO INFECTIOUS ORGANISM, UNSPECIFIED LATERALITY, UNSPECIFIED PART OF LUNG: ICD-10-CM

## 2023-11-02 PROCEDURE — 71250 CT THORAX DX C-: CPT

## 2023-11-22 ENCOUNTER — OFFICE VISIT (OUTPATIENT)
Dept: CARDIOLOGY | Facility: CLINIC | Age: 83
End: 2023-11-22
Payer: MEDICARE

## 2023-11-22 VITALS
WEIGHT: 179 LBS | HEART RATE: 68 BPM | OXYGEN SATURATION: 99 % | BODY MASS INDEX: 25.62 KG/M2 | HEIGHT: 70 IN | DIASTOLIC BLOOD PRESSURE: 62 MMHG | SYSTOLIC BLOOD PRESSURE: 112 MMHG

## 2023-11-22 DIAGNOSIS — I50.42 CHRONIC COMBINED SYSTOLIC AND DIASTOLIC CHF (CONGESTIVE HEART FAILURE): Primary | Chronic | ICD-10-CM

## 2023-11-22 DIAGNOSIS — I34.0 NONRHEUMATIC MITRAL VALVE REGURGITATION: Chronic | ICD-10-CM

## 2023-11-22 DIAGNOSIS — I10 ESSENTIAL HYPERTENSION: Chronic | ICD-10-CM

## 2023-11-22 DIAGNOSIS — I48.21 PERMANENT ATRIAL FIBRILLATION: Chronic | ICD-10-CM

## 2023-11-22 DIAGNOSIS — Z79.01 CHRONIC ANTICOAGULATION: ICD-10-CM

## 2023-11-22 NOTE — PROGRESS NOTES
Subjective:     Encounter Date:11/22/2023      Patient ID: Riley Foote is a 82 y.o. male.    Chief Complaint: Follow-up CHF, and mitral regurgitation    History of Present Illness    Mr. Foote returns today for his first outpatient evaluation after I last saw him when performing diagnostic coronary angiography as well as right heart catheterization, and left heart catheterization on 10/11/2023. By way of review, he was initially referred to me with depressed ejection fraction, and severe mitral regurgitation, and we have worked on implementing guideline directed medical therapy for these ever since. Unfortunately, it appears as though the mitral regurgitation has been severe for years as he was followed at another facility with a dilated ventricle, and ventricular dysfunction. He has remarkably not really been manifesting many symptoms of any of the above, and has tolerated implementation guideline directed medical therapy very well thus far. He also has permanent atrial fibrillation.    Mr. Foote comes to the clinic today stating he is doing well. He denies any changes from his perspective in the last 6 weeks. He reports he is still playing golf. The patient denies any trouble breathing. He reports that when he really exerts himself, he gets short of breath, but it has not worsened in the last few months. He denies any orthopnea or swelling.    The patient reports that he is still on warfarin. He reports that he checks his INR himself. He reports that he last checked it this morning.    The following portions of the patient's history were reviewed and updated as appropriate: allergies, current medications, past family history, past medical history, past social history, past surgical history, and problem list.    Review of Systems   Constitutional: Negative for malaise/fatigue.   Cardiovascular:  Negative for chest pain, claudication, dyspnea on exertion, leg swelling, near-syncope, orthopnea, palpitations,  paroxysmal nocturnal dyspnea and syncope.   Respiratory:  Negative for shortness of breath.    Hematologic/Lymphatic: Does not bruise/bleed easily.         Current Outpatient Medications:     albuterol (PROVENTIL) (2.5 MG/3ML) 0.083% nebulizer solution, Take 2.5 mg by nebulization Every 4 (Four) Hours As Needed for Wheezing or Shortness of Air., Disp: 120 each, Rfl: 11    apixaban (ELIQUIS) 5 MG tablet tablet, Take 1 tablet by mouth 2 (Two) Times a Day. *CAN START TAKING THIS TOMORROW NIGHT (11/23 PM), AND TAKE IT TWICE A DAY EVERY DAY THEREAFTER, Disp: 60 tablet, Rfl: 11    Apoaequorin (PREVAGEN PO), Take  by mouth., Disp: , Rfl:     aspirin 81 MG chewable tablet, Chew 1 tablet Daily., Disp: , Rfl:     azelastine (ASTELIN) 0.1 % nasal spray, 2 sprays into the nostril(s) as directed by provider 2 (Two) Times a Day. Use in each nostril as directed, Disp: , Rfl:     dapagliflozin Propanediol (Farxiga) 10 MG tablet, Take 10 mg by mouth Daily., Disp: 30 tablet, Rfl: 11    esomeprazole (nexIUM) 40 MG capsule, Take 1 capsule by mouth Every Morning Before Breakfast., Disp: , Rfl:     FLUoxetine (PROzac) 20 MG capsule, Take 1 capsule by mouth Daily., Disp: , Rfl:     fluticasone (FLONASE) 50 MCG/ACT nasal spray, 2 sprays into the nostril(s) as directed by provider Daily., Disp: , Rfl:     Fluticasone-Umeclidin-Vilant (Trelegy Ellipta) 200-62.5-25 MCG/ACT aerosol powder , 1 puff Daily., Disp: , Rfl:     gabapentin (NEURONTIN) 300 MG capsule, Take 2 capsules by mouth Daily. At bedtime, Disp: , Rfl:     guaiFENesin (MUCINEX) 600 MG 12 hr tablet, Take 2 tablets by mouth 2 (Two) Times a Day., Disp: , Rfl:     Misc Natural Products (OSTEO BI-FLEX ADV JOINT SHIELD PO), Take 1 tablet by mouth Daily., Disp: , Rfl:     Multiple Vitamins-Minerals (CENTRUM SILVER) tablet, 1 tablet Daily., Disp: , Rfl:     NON FORMULARY, CPAP and 02 gets this from [facility], Disp: , Rfl:     sacubitril-valsartan (Entresto) 49-51 MG tablet, Take 1 tablet by  mouth 2 (Two) Times a Day., Disp: 60 tablet, Rfl: 11    spironolactone (ALDACTONE) 25 MG tablet, Take 1 tablet by mouth Daily., Disp: 30 tablet, Rfl: 11    tamsulosin (FLOMAX) 0.4 MG capsule 24 hr capsule, Take 1 capsule by mouth 2 (Two) Times a Day., Disp: , Rfl:     vitamin B-12 (CYANOCOBALAMIN) 1000 MCG tablet, Take 1 tablet by mouth Daily., Disp: , Rfl:     vitamin C (ASCORBIC ACID) 500 MG tablet, Take 1 tablet by mouth Daily., Disp: , Rfl:        Objective:      Vitals:    11/22/23 1451   BP: 112/62   Pulse: 68   SpO2: 99%     Vitals and nursing note reviewed.   Constitutional:       General: Not in acute distress.     Appearance: Not in distress.   Neck:      Vascular: No JVD or JVR. JVD normal.   Pulmonary:      Effort: Pulmonary effort is normal.      Breath sounds: Normal breath sounds.   Cardiovascular:      Normal rate. Irregularly irregular rhythm.      Murmurs: There is a grade 2/6 holosystolic murmur. Located at the apex and left axilla.      No gallop.  No rub.   Pulses:     Intact distal pulses.   Edema:     Peripheral edema absent.   Skin:     General: Skin is warm and dry.   Neurological:      Mental Status: Alert, oriented to person, place, and time and oriented to person, place and time.     Lab Review:       Results from his right, and left heart catheterization: RA pressure was 6 with wedge pressure of 13, and mean pulmonary arterial pressure of 20 (all normal). Cardiac output was normal at 5.6 L/min, and normal pulmonary vascular resistance. The coronary arteries were near normal. The left ventriculography showed EF of 38.4% with severe mitral regurgitation.      ECG 12 Lead    Date/Time: 11/22/2023 2:57 PM  Performed by: Kannan Piña MD    Authorized by: Kannan Piña MD  Comparison: compared with previous ECG from 9/20/2023  Comparison to previous ECG: Axis shift, likely due to limb lead reversal on current ECG.  Rhythm: atrial fibrillation  QRS axis: right (extreme right axis  deviation, likely secondary to limb lead reversal.)    Clinical impression: abnormal EKG        Assessment/Plan:     Problem List Items Addressed This Visit (all established, stable)         Cardiac and Vasculature    Essential hypertension (Chronic)    Atrial fibrillation (Chronic)    Chronic combined systolic and diastolic CHF (congestive heart failure) - Primary (Chronic)    Nonrheumatic mitral valve regurgitation (Chronic)    Overview     Eccentric (posteriorly directed) and therefore severe            Coag and Thromboembolic    Chronic anticoagulation    Overview     Has been on coumadin for CVA prophylaxis with AF - never switched to DOAC because, as family states, he was told his AF was valvular.  This is actually not the case - as 'valvular af' implies rheumatic mitral stenosis (and not regurgitation, which is what he has)              Recommendations/plans:    Mr. Foote continues to do wonderfully despite mild to moderately depressed LV in the setting of severe mitral regurgitation, and permanent atrial fibrillation. Given the risk of surgery at his age, despite his overall wonderful physical condition, I do not think mitral valve repair, particularly given the uncertainty with regards to whether his LV function would recover (since we he met me with severe mitral regurgitation that was many years in the making, along with a depressed LVEF, often times LVEF does not recover despite mitral valve correction), I do not think it is worth the risk of open heart procedure. However, it may be worth taking on the risk of MitraClip when that is available, and he is interested in hearing about this when we are offering here locally; likely to come in the coming months.     In the meantime, continue all current medical therapies for chronic systolic heart failure as he is currently NYHA 1.    Lastly, as we have discussed previously, today he is ready to transition his chronic anticoagulation. He has remained on  "Coumadin for many years for stroke protection in light of his permanent atrial fibrillation under the faulty assumption that his atrial fibrillation was \"valvular.\" As I had previously advised him, and his family, the moniker \"valvular atrial fibrillation\" only pertains to mitral stenosis (rheumatic), and not mitral regurgitation. Therefore, he is a candidate for DOAC therapy, and has agreed today to start Eliquis, which appears to be covered by his insurance plan. His appropriate dose will be 5 mg twice daily, so I have sent this prescription to his pharmacy, and advised that he stop taking Coumadin effective now. He did check his INR this morning, and says it was 2.0, so advised that, with no more Coumadin being taken, he can start taking Eliquis tomorrow night, and continue taking it twice daily every day thereafter.    Otherwise, we will plan for follow-up with him in 6 months, or sooner with any new or worsening symptoms.      Transcribed from ambient dictation for Kannan Piña MD by Temo Jasmine.  11/22/23   16:57 CST    Patient or patient representative verbalized consent to the visit recording.  I Kannan Piña MD have personally performed the services described in this document as scribed by the above individual, and it is both accurate and complete.   I have edited each component as needed.    Kannan Piña MD  11/23/2023  13:34 CST        "

## 2023-11-22 NOTE — PATIENT INSTRUCTIONS
If you are able to fill prescription for apixaban (Eliquis), then stop taking coumadin as of now    Start taking eliquis tomorrow night - it will then be twice a day (one in morning and one in the night)

## 2023-12-13 ENCOUNTER — TRANSCRIBE ORDERS (OUTPATIENT)
Dept: ADMINISTRATIVE | Facility: HOSPITAL | Age: 83
End: 2023-12-13
Payer: MEDICARE

## 2023-12-13 DIAGNOSIS — N64.4 BREAST PAIN: Primary | ICD-10-CM

## 2023-12-20 ENCOUNTER — TRANSCRIBE ORDERS (OUTPATIENT)
Dept: ADMINISTRATIVE | Facility: HOSPITAL | Age: 83
End: 2023-12-20
Payer: MEDICARE

## 2023-12-20 DIAGNOSIS — N64.4 BREAST PAIN: Primary | ICD-10-CM

## 2023-12-31 DIAGNOSIS — Z20.828 EXPOSURE TO INFLUENZA: ICD-10-CM

## 2023-12-31 DIAGNOSIS — J06.9 UPPER RESPIRATORY TRACT INFECTION, UNSPECIFIED TYPE: Primary | ICD-10-CM

## 2023-12-31 RX ORDER — DOXYCYCLINE HYCLATE 100 MG/1
100 CAPSULE ORAL 2 TIMES DAILY
Qty: 20 CAPSULE | Refills: 0 | Status: SHIPPED | OUTPATIENT
Start: 2023-12-31 | End: 2024-01-10

## 2023-12-31 RX ORDER — METHYLPREDNISOLONE 4 MG/1
TABLET ORAL
Qty: 21 TABLET | Refills: 0 | Status: SHIPPED | OUTPATIENT
Start: 2023-12-31

## 2023-12-31 RX ORDER — OSELTAMIVIR PHOSPHATE 75 MG/1
75 CAPSULE ORAL DAILY
Qty: 10 CAPSULE | Refills: 0 | Status: SHIPPED | OUTPATIENT
Start: 2023-12-31 | End: 2024-01-10

## 2024-03-23 DIAGNOSIS — K04.7 DENTAL ABSCESS: Primary | ICD-10-CM

## 2024-03-23 RX ORDER — AMOXICILLIN AND CLAVULANATE POTASSIUM 875; 125 MG/1; MG/1
1 TABLET, FILM COATED ORAL 2 TIMES DAILY
Qty: 20 TABLET | Refills: 0 | Status: SHIPPED | OUTPATIENT
Start: 2024-03-23 | End: 2024-04-02

## 2024-05-31 ENCOUNTER — OFFICE VISIT (OUTPATIENT)
Dept: PULMONOLOGY | Facility: CLINIC | Age: 84
End: 2024-05-31
Payer: MEDICARE

## 2024-05-31 ENCOUNTER — PROCEDURE VISIT (OUTPATIENT)
Dept: PULMONOLOGY | Facility: CLINIC | Age: 84
End: 2024-05-31
Payer: MEDICARE

## 2024-05-31 VITALS
HEIGHT: 69 IN | SYSTOLIC BLOOD PRESSURE: 120 MMHG | BODY MASS INDEX: 26.07 KG/M2 | DIASTOLIC BLOOD PRESSURE: 72 MMHG | WEIGHT: 176 LBS | HEART RATE: 58 BPM | OXYGEN SATURATION: 95 %

## 2024-05-31 DIAGNOSIS — J43.8 OTHER EMPHYSEMA: Chronic | ICD-10-CM

## 2024-05-31 DIAGNOSIS — J30.9 ALLERGIC RHINITIS, UNSPECIFIED SEASONALITY, UNSPECIFIED TRIGGER: Chronic | ICD-10-CM

## 2024-05-31 DIAGNOSIS — G47.33 OBSTRUCTIVE SLEEP APNEA: Chronic | ICD-10-CM

## 2024-05-31 DIAGNOSIS — R91.8 LUNG NODULES: Primary | ICD-10-CM

## 2024-05-31 DIAGNOSIS — J96.11 CHRONIC RESPIRATORY FAILURE WITH HYPOXIA: Chronic | ICD-10-CM

## 2024-05-31 DIAGNOSIS — R91.8 LUNG NODULES: ICD-10-CM

## 2024-05-31 DIAGNOSIS — J47.9 BRONCHIECTASIS WITHOUT COMPLICATION: Primary | Chronic | ICD-10-CM

## 2024-05-31 PROBLEM — J18.9 PNEUMONIA DUE TO INFECTIOUS ORGANISM: Status: RESOLVED | Noted: 2023-02-14 | Resolved: 2024-05-31

## 2024-05-31 NOTE — PATIENT INSTRUCTIONS
Bronchiectasis    Bronchiectasis is a condition in which the airways in the lungs (bronchi) are damaged and widened. The condition makes it hard for the lungs to get rid of mucus, and it causes mucus to gather in the bronchi. This condition often leads to lung infections, which can make the condition worse.  What are the causes?  You can be born with this condition, or you can develop it later in life. Common causes of this condition include:  Cystic fibrosis.  Repeated lung infections, such as pneumonia or tuberculosis.  An object or other blockage in the lungs.  Breathing in fluid, food, or other objects (aspiration).  A problem with the body's defense system (immune system) and lung structure that is present at birth (congenital).  Sometimes the cause is not known.  What are the signs or symptoms?  Common symptoms of this condition include:  A daily cough that brings up mucus and lasts for more than 3 weeks.  Lung infections that happen often.  Shortness of breath and wheezing.  Weakness and feeling tired (fatigue).  How is this diagnosed?  This condition is diagnosed with tests, such as:  Chest X-rays or CT scans. These are done to check for changes in the lungs.  Breathing tests. These are done to check how well your lungs are working.  A test of a sample of your saliva (sputum culture). This test is done to check for infection.  Blood tests and other tests. These are done to check for related diseases or causes.  How is this treated?  Treatment for this condition depends on the severity of the illness and its cause. Treatment may include:  Medicines that loosen mucus so it can be coughed up (mucolytics).  Medicines that relax the muscles of the bronchi (bronchodilators).  Antibiotic medicines to prevent or treat infection.  Physical therapy to help clear mucus from the lungs. Techniques may include:  Postural drainage. This is when you sit or lie in certain positions so that mucus can drain by gravity.  Chest  percussion. This involves tapping the chest or back with a cupped hand.  Chest vibration. For this therapy, a hand or special equipment vibrates your chest and back.  Surgery to remove the affected part of the lung. This may be done in severe cases.  Follow these instructions at home:  Medicines  Take over-the-counter and prescription medicines only as told by your health care provider.  If you were prescribed an antibiotic medicine, take it as told by your health care provider. Do not stop taking the antibiotic even if you start to feel better.  Avoid taking sedatives and antihistamines unless your health care provider tells you to take them. These medicines tend to thicken the mucus in the lungs.  Managing symptoms  Do breathing exercises or techniques to clear your lungs as told by your health care provider.  Consider using a cold steam vaporizer or humidifier in your room or home to help loosen secretions.  If you have a cough that gets worse at night, try sleeping in a semi-upright position.  General instructions  Get plenty of rest.  Drink enough fluid to keep your urine pale yellow.  Stay inside when pollution and ozone levels are high.  Stay up to date with vaccinations and immunizations.  Avoid cigarette smoke and other lung irritants.  Do not use any products that contain nicotine or tobacco. These products include cigarettes, chewing tobacco, and vaping devices, such as e-cigarettes. If you need help quitting, ask your health care provider.  Keep all follow-up visits. This is important.  Contact a health care provider if:  You cough up more sputum than before and the sputum is yellow or green in color.  You have a fever or chills.  You cannot control your cough and are losing sleep.  Get help right away if:  You cough up blood.  You have chest pain.  You have increasing shortness of breath.  You have pain that gets worse or is not controlled with medicines.  You have a fever and your symptoms suddenly get  worse.  These symptoms may be an emergency. Get help right away. Call 911.  Do not wait to see if the symptoms will go away.  Do not drive yourself to the hospital.  Summary  Bronchiectasis is a condition in which the airways in the lungs (bronchi) are damaged and widened. The condition makes it hard for the lungs to get rid of mucus, and it causes mucus to gather in the bronchi.  Treatment usually includes therapy to help clear mucus from the lungs.  Avoid cigarette smoke and other lung irritants.  Stay up to date with vaccinations and immunizations.  This information is not intended to replace advice given to you by your health care provider. Make sure you discuss any questions you have with your health care provider.  Document Revised: 08/31/2022 Document Reviewed: 07/19/2022  [employer] Patient Education © 2024 [employer]  Sleep Apnea  Sleep apnea affects breathing during sleep. It causes breathing to stop for 10 seconds or more, or to become shallow. People with sleep apnea usually snore loudly.  It can also increase the risk of:  Heart attack.  Stroke.  Being very overweight (obese).  Diabetes.  Heart failure.  Irregular heartbeat.  High blood pressure.  The goal of treatment is to help you breathe normally again.  What are the causes?    The most common cause of this condition is a collapsed or blocked airway.  There are three kinds of sleep apnea:  Obstructive sleep apnea. This is caused by a blocked or collapsed airway.  Central sleep apnea. This happens when the brain does not send the right signals to the muscles that control breathing.  Mixed sleep apnea. This is a combination of obstructive and central sleep apnea.  What increases the risk?  Being overweight.  Smoking.  Having a small airway.  Being older.  Being male.  Drinking alcohol.  Taking medicines to calm yourself (sedatives or tranquilizers).  Having family members with the condition.  Having a tongue or tonsils that are larger than  normal.  What are the signs or symptoms?  Trouble staying asleep.  Loud snoring.  Headaches in the morning.  Waking up gasping.  Dry mouth or sore throat in the morning.  Being sleepy or tired during the day.  If you are sleepy or tired during the day, you may also:  Not be able to focus your mind (concentrate).  Forget things.  Get angry a lot and have mood swings.  Feel sad (depressed).  Have changes in your personality.  Have less interest in sex, if you are female.  Be unable to have an erection, if you are male.  How is this treated?    Sleeping on your side.  Using a medicine to get rid of mucus in your nose (decongestant).  Avoiding the use of alcohol, medicines to help you relax, or certain pain medicines (narcotics).  Losing weight, if needed.  Changing your diet.  Quitting smoking.  Using a machine to open your airway while you sleep, such as:  An oral appliance. This is a mouthpiece that shifts your lower jaw forward.  A CPAP device. This device blows air through a mask when you breathe out (exhale).  An EPAP device. This has valves that you put in each nostril.  A BIPAP device. This device blows air through a mask when you breathe in (inhale) and breathe out.  Having surgery if other treatments do not work.  Follow these instructions at home:  Lifestyle  Make changes that your doctor recommends.  Eat a healthy diet.  Lose weight if needed.  Avoid alcohol, medicines to help you relax, and some pain medicines.  Do not smoke or use any products that contain nicotine or tobacco. If you need help quitting, ask your doctor.  General instructions  Take over-the-counter and prescription medicines only as told by your doctor.  If you were given a machine to use while you sleep, use it only as told by your doctor.  If you are having surgery, make sure to tell your doctor you have sleep apnea. You may need to bring your device with you.  Keep all follow-up visits.  Contact a doctor if:  The machine that you were  given to use during sleep bothers you or does not seem to be working.  You do not get better.  You get worse.  Get help right away if:  Your chest hurts.  You have trouble breathing in enough air.  You have an uncomfortable feeling in your back, arms, or stomach.  You have trouble talking.  One side of your body feels weak.  A part of your face is hanging down.  These symptoms may be an emergency. Get help right away. Call your local emergency services (911 in the [place]  Do not wait to see if the symptoms will go away.  Do not drive yourself to the hospital.  Summary  This condition affects breathing during sleep.  The most common cause is a collapsed or blocked airway.  The goal of treatment is to help you breathe normally while you sleep.  This information is not intended to replace advice given to you by your health care provider. Make sure you discuss any questions you have with your health care provider.  Document Revised: 07/27/2022 Document Reviewed: 11/26/2021  [employer] Patient Education © 2024 [employer]

## 2024-05-31 NOTE — PROCEDURES
Spirometry with Diffusion Capacity    Performed by: Deepa Beck, RRT  Authorized by: Ilene Oliveros APRN     Pre Drug % Predicted    FVC: 104%   FEV1: 129%   FEF 25-75%: 243%   FEV1/FVC: 91%   DLCO: 99%   D/VAsb: 88%    Interpretation   Spirometry   Spirometry shows normal results. midflow is normal.  Review of FVL curve   Patient's effort is normal.   Diffusion Capacity  The patient's diffusion capacity is normal.  Diffusion capacity is normal when corrected for alveolar volume.

## 2024-05-31 NOTE — PROGRESS NOTES
" Ilene Oliveros, OLEG  [facility]  [address]            [place] [address]  Phone: [phone]  Fax: [phone]          Chief Complaint  Bronchiectasis without complication    Subjective    History of Present Illness  Riley Foote presents to [facility] for appointment.  The patient has known bronchiectasis (CPT, mucinex), chronic respiratory failure with hypoxia-nocturnal O2, GGO, SIMÓN (PAP), lung nodules, and emphysema.  The patient is using Trelegy, albuterol HFA/neb as needed. He follows with Dr. Piña for cardiology services.  The patient has just returned from [place].  He is doing well from a pulmonary standpoint.  He denies respiratory infection or hospitalization since last office visit.  He is due for follow-up CT of the chest regarding lung nodules in November 2024.  Order will be placed. No increasing shortness of breath, no fever, no chills, no cough with purulent sputum.       Objective   Vital Signs:   /72   Pulse 58   Ht 175.3 cm (69\")   Wt 79.8 kg (176 lb)   SpO2 95% Comment: RA  BMI 25.99 kg/m²        Physical Exam  Vitals reviewed.   Constitutional:       General: He is not in acute distress.     Appearance: He is well-developed.   HENT:      Head: Normocephalic and atraumatic.   Eyes:      General: No scleral icterus.     Conjunctiva/sclera: Conjunctivae normal.      Pupils: Pupils are equal, round, and reactive to light.   Cardiovascular:      Rate and Rhythm: Normal rate.   Pulmonary:      Effort: Pulmonary effort is normal. No respiratory distress.      Breath sounds: Normal breath sounds. No wheezing or rales.   Musculoskeletal:         General: Normal range of motion.      Cervical back: Normal range of motion and neck supple.   Skin:     General: Skin is warm and dry.   Neurological:      Mental Status: He is alert and oriented to person, place, and time.   Psychiatric:         Behavior: Behavior " normal.         Thought Content: Thought content normal.         Judgment: Judgment normal.        Result Review :  The following data was reviewed by: OLEG Bermeo on 05/31/2024:    CT Chest Without Contrast Diagnostic (11/02/2023 14:11)   IMPRESSION:  1. No acute bony abnormality. No infiltrate or significant  consolidation.  2. Tiny nodules in the right lung. No features of malignancy.  3. Chronic emphysematous lung changes. Chronic significant  bronchiectasis more pronounced in the lower lobes, left more than the right.  4. Cardiomegaly.    Rest/Exercise Pulse Ox Values          3/27/2023    13:30   Rest/Exercise Pulse Ox Results   Rest room air SAT % 98   Exercise room air SAT % 98     PFT Values          5/31/2024    10:00   Pre Drug PFT Results      FEV1 129   FEF 25-75% 243   FEV1/FVC 91   Other Tests PFT Results   DLCO 99   D/VAsb 88       Results for orders placed in visit on 05/31/24    Spirometry with Diffusion Capacity    Narrative  Spirometry with Diffusion Capacity    Performed by: Deepa Beck, RRT  Authorized by: Ilene Oliveros APRN  Pre Drug % Predicted  FVC: 104%  FEV1: 129%  FEF 25-75%: 243%  FEV1/FVC: 91%  DLCO: 99%  D/VAsb: 88%    Interpretation  Spirometry  Spirometry shows normal results. midflow is normal.  Review of FVL curve  Patient's effort is normal.  Diffusion Capacity  The patient's diffusion capacity is normal.  Diffusion capacity is normal when corrected for alveolar volume.      Results for orders placed during the hospital encounter of 04/11/23    Pulmonary Function Test    Narrative  [facility] - Pulmonary Function Test    [address] [place] [address]  [place]  [address]  [phone]    Patient : Riley Foote  MRN : [id number]  CSN : [id number]  Pulmonologist : Antoine Mayes MD  Date : 4/11/2023    ______________________________________________________________________    Interpretation :  1.  Spirometry is within normal limits and in  fact midflows are supranormal.  2.  Lung volumes reveal a decrease in expiratory reserve volume with a coexisting increase in inspiratory capacity and otherwise are within normal limits.  3.  There is a mild diffusion impairment even when corrected for alveolar volume.      Antoine Mayes MD           Assessment and Plan  Diagnoses and all orders for this visit:    1. Bronchiectasis without complication (Primary)  Overview:  CT chest 2/15/23 - Similar bronchiectasis with peribronchial thickening.    3/27/23 elevated IgM, decreased IgG subclass 2  3/27/23 IgA, IgE, IgG, IgG subclass 1, IgG subclass 3, IgG subclass 4 WNL  5/12/23 VICK, PE, FLC, Serum - no M spike, Kappa/Lambda ratio WNL     Trelegy, albuterol neb    CPT, Mucinex    Assessment & Plan:  Continue current treatment regimen.        2. Lung nodules  Overview:  CT chest 2/15/23 - Persistent patchy left lower lobe opacities with new scattered groundglass opacities with bilateral upper and right lower lobes worrisome for multifocal pneumonia.    CT Chest Without Contrast Diagnostic (11/02/2023 14:11)  Tiny nodule is seen in the right upper lobe, image #36 and series 4,  measuring less than 3 mm in maximum dimension. Another ill-defined  subpleural nodule is seen in the right lower lobe, image #142 in series  4, measuring 4 mm in maximum dimension.  Scattered pneumatoceles are seen particularly in the right middle lobe  posteriorly, suggesting a prior inflammatory/infectious process.    Assessment & Plan:  Obtain f/u CT chest in November 2024.     Orders:  -     CT Chest Without Contrast; Future    3. Allergic rhinitis, unspecified seasonality, unspecified trigger  Overview:  Astelin, Flonase    Assessment & Plan:  Continue current treatment regimen.        4. Chronic respiratory failure with hypoxia  Overview:  Noc O2 2L    Assessment & Plan:  Continue chronic oxygen therapy.  The patient is benefiting from it and wishes to continue it.        5.  Obstructive sleep apnea  Overview:  PAP+2L     Assessment & Plan:  Continue home PAP device. The patient is benefiting from it and wishes to continue it.   Continue chronic oxygen therapy.  The patient is benefiting from it and wishes to continue it.        6. Other emphysema  Overview:  3/27/23 alpha 1 MM    Trelegy-100, albuterol HFA/neb    Assessment & Plan:  Stable.   Continue bronchodilator therapy.   Continue to monitor DLCO via PFT.         Orders:  -     Spirometry with Diffusion Capacity        Follow Up  Ilene Oliveros, APRN  5/31/2024  12:03 CDT    Return in about 5 months (around 11/11/2024) for CT.    Patient was given instructions and counseling regarding his condition or for health maintenance advice. Please see specific information pulled into the AVS if appropriate.     Please note that portions of this note were completed with a voice recognition program.

## 2024-06-19 ENCOUNTER — OFFICE VISIT (OUTPATIENT)
Dept: CARDIOLOGY | Facility: CLINIC | Age: 84
End: 2024-06-19
Payer: MEDICARE

## 2024-06-19 VITALS
HEART RATE: 82 BPM | BODY MASS INDEX: 26.48 KG/M2 | DIASTOLIC BLOOD PRESSURE: 66 MMHG | OXYGEN SATURATION: 96 % | WEIGHT: 178.8 LBS | SYSTOLIC BLOOD PRESSURE: 104 MMHG | HEIGHT: 69 IN

## 2024-06-19 DIAGNOSIS — I50.22 CHRONIC HFREF (HEART FAILURE WITH REDUCED EJECTION FRACTION): ICD-10-CM

## 2024-06-19 DIAGNOSIS — I34.0 NONRHEUMATIC MITRAL VALVE REGURGITATION: Primary | Chronic | ICD-10-CM

## 2024-06-19 DIAGNOSIS — I48.21 PERMANENT ATRIAL FIBRILLATION: Chronic | ICD-10-CM

## 2024-06-19 PROCEDURE — 3078F DIAST BP <80 MM HG: CPT | Performed by: NURSE PRACTITIONER

## 2024-06-19 PROCEDURE — 1160F RVW MEDS BY RX/DR IN RCRD: CPT | Performed by: NURSE PRACTITIONER

## 2024-06-19 PROCEDURE — 3074F SYST BP LT 130 MM HG: CPT | Performed by: NURSE PRACTITIONER

## 2024-06-19 PROCEDURE — 93000 ELECTROCARDIOGRAM COMPLETE: CPT | Performed by: NURSE PRACTITIONER

## 2024-06-19 PROCEDURE — 1159F MED LIST DOCD IN RCRD: CPT | Performed by: NURSE PRACTITIONER

## 2024-06-19 PROCEDURE — 99214 OFFICE O/P EST MOD 30 MIN: CPT | Performed by: NURSE PRACTITIONER

## 2024-06-19 NOTE — PROGRESS NOTES
"     Subjective:     Encounter Date:6/19/2024      Patient ID: Riley Foote is a 83 y.o. male.    Chief Complaint: Follow-up CHF, and mitral regurgitation    History of Present Illness    The patient presents to follow-up regarding his chronic systolic CHF and severe mitral regurgitation.  After initially meeting Dr. Piña he had guideline directed medical therapy implemented for his heart failure.  However, he has remained NYHA class I and has not manifested signs of acute congestive heart failure.  He also has permanent atrial fibrillation.  He did undergo right and left cardiac catheterization in October.  This revealed normal coronary arteries and confirmed severe mitral regurgitation.  See results below.    When he came back to see Dr. Piña he was doing well and still playing golf.  He was not having any trouble breathing.  He was not having orthopnea or swelling.  At that visit, Dr. Piña transitioned him from warfarin to Eliquis for anticoagulation.  It appears he was tolerating Farxiga, Aldactone and middle dose Entresto at that point.  Prior notes indicate he had not been on beta-blockers due to prior slow ventricular response.    The patient presents today for follow-up.  Since his last visit due to near syncopal spells and signs of dehydration with systolic blood pressures in the 80s to 90s as well as gynecomastia, he is now off of Farxiga and Aldactone and on 24/26 mg of Entresto in the morning and 49/51 mg of Entresto in the evening.  He states his blood pressure on average is in the 120s.  He states he feels better and his near syncopal spells are now rare.  He states that sometimes he is \"staggery.\"  He has rare shortness of breath with certain activities such as \"picking up limbs.\"  He is still golfing without breathing issues.  He occasionally has palpitations at night that improves when he rolls over on his side.  He states this is chronic over the past few years and not worsening.  He denies " rapid weight gain, chest pain, orthopnea, PND.    The following portions of the patient's history were reviewed and updated as appropriate: allergies, current medications, past family history, past medical history, past social history, past surgical history, and problem list.    Review of Systems   Constitutional: Negative for malaise/fatigue.   Cardiovascular:  Positive for dyspnea on exertion, near-syncope and palpitations. Negative for chest pain, claudication, leg swelling, orthopnea, paroxysmal nocturnal dyspnea and syncope.   Respiratory:  Negative for cough.    Hematologic/Lymphatic: Does not bruise/bleed easily.   Musculoskeletal:  Negative for falls.   Gastrointestinal:  Negative for bloating.           Current Outpatient Medications:     albuterol (PROVENTIL) (2.5 MG/3ML) 0.083% nebulizer solution, Take 2.5 mg by nebulization Every 4 (Four) Hours As Needed for Wheezing or Shortness of Air., Disp: 120 each, Rfl: 11    apixaban (ELIQUIS) 5 MG tablet tablet, Take 1 tablet by mouth 2 (Two) Times a Day. *CAN START TAKING THIS TOMORROW NIGHT (11/23 PM), AND TAKE IT TWICE A DAY EVERY DAY THEREAFTER, Disp: 60 tablet, Rfl: 11    Apoaequorin (PREVAGEN PO), Take  by mouth., Disp: , Rfl:     aspirin 81 MG chewable tablet, Chew 1 tablet Daily., Disp: , Rfl:     azelastine (ASTELIN) 0.1 % nasal spray, 2 sprays into the nostril(s) as directed by provider 2 (Two) Times a Day. Use in each nostril as directed, Disp: , Rfl:     esomeprazole (nexIUM) 40 MG capsule, Take 1 capsule by mouth Every Morning Before Breakfast., Disp: , Rfl:     FLUoxetine (PROzac) 20 MG capsule, Take 1 capsule by mouth Daily., Disp: , Rfl:     fluticasone (FLONASE) 50 MCG/ACT nasal spray, 2 sprays into the nostril(s) as directed by provider Daily., Disp: , Rfl:     Fluticasone-Umeclidin-Vilant (Trelegy Ellipta) 200-62.5-25 MCG/ACT aerosol powder , 1 puff Daily., Disp: , Rfl:     gabapentin (NEURONTIN) 300 MG capsule, Take 2 capsules by mouth Daily.  At bedtime, Disp: , Rfl:     guaiFENesin (MUCINEX) 600 MG 12 hr tablet, Take 2 tablets by mouth 2 (Two) Times a Day., Disp: , Rfl:     Misc Natural Products (OSTEO BI-FLEX ADV JOINT SHIELD PO), Take 1 tablet by mouth Daily., Disp: , Rfl:     Multiple Vitamins-Minerals (CENTRUM SILVER) tablet, 1 tablet Daily., Disp: , Rfl:     NON FORMULARY, CPAP and 02 gets this from [facility], Disp: , Rfl:     sacubitril-valsartan (Entresto) 49-51 MG tablet, Take 1 tablet by mouth 2 (Two) Times a Day., Disp: 60 tablet, Rfl: 11    tamsulosin (FLOMAX) 0.4 MG capsule 24 hr capsule, Take 1 capsule by mouth 2 (Two) Times a Day., Disp: , Rfl:     vitamin B-12 (CYANOCOBALAMIN) 1000 MCG tablet, Take 1 tablet by mouth Daily., Disp: , Rfl:     vitamin C (ASCORBIC ACID) 500 MG tablet, Take 1 tablet by mouth Daily., Disp: , Rfl:        Objective:      Vitals:    06/19/24 1504   BP: 104/66   Pulse: 82   SpO2: 96%   Weight - 178 lbs    Vitals and nursing note reviewed.   Constitutional:       General: Not in acute distress.     Appearance: Well-developed and not in distress. Not diaphoretic.   Neck:      Vascular: No JVD or JVR. JVD normal.   Pulmonary:      Effort: Pulmonary effort is normal. No respiratory distress.      Breath sounds: Normal breath sounds.   Cardiovascular:      Normal rate. Irregularly irregular rhythm.      Murmurs: There is a grade 2/6 systolic murmur.   Edema:     Peripheral edema absent.   Abdominal:      Tenderness: There is no abdominal tenderness.   Skin:     General: Skin is warm and dry.   Neurological:      Mental Status: Alert, oriented to person, place, and time and oriented to person, place and time.       Lab Review:   Lab Results   Component Value Date    GLUCOSE 116 (H) 10/11/2023    BUN 18 10/11/2023    CREATININE 0.97 10/11/2023    EGFRRESULT 77 03/27/2023    EGFR 77.5 10/11/2023    BCR 18.6 10/11/2023    K 3.9 10/11/2023    CO2 24.0 10/11/2023    CALCIUM 9.6 10/11/2023    PROTENTOTREF 7.1 05/12/2023     ALBUMIN 4.1 06/29/2023    BILITOT 0.7 06/29/2023    AST 20 06/29/2023    ALT 22 06/29/2023            ECG 12 Lead    Date/Time: 6/19/2024 3:52 PM  Performed by: Blank Russo APRN    Authorized by: Blank Russo APRN  Comparison: compared with previous ECG from 11/22/2023  Similar to previous ECG  Rhythm: atrial fibrillation  Ectopy: infrequent PVCs  BPM: 83  Other findings: non-specific ST-T wave changes    Clinical impression: abnormal EKG      10/2023 cath report:    Impression:  1. Near normal coronary arteries  2. Moderate LV global systolic dysfunction, with LVEF calculated at 38.4% based on left ventriculography  3. Severe mitral regurgitation on left ventriculography (4+) with severely dilated left atrium  4. Normal right-left-sided filling pressures, with normal cardiac output and normal pulmonary vascular resistance     Plan:   -Continue guideline directed medical therapy for systolic heart failure. Today's findings suggest patient is well compensated with normal cardiac output despite depressed LVEF, which is likely secondary to longstanding (unfortunately, previously unrecognized by prior providers) mitral regurgitation.  -We will review his case with structural heart committee, but do not anticipate imminent need for valvular intervention      Kannan Piña MD     Assessment/Plan:     Problem List Items Addressed This Visit (all established, stable)         Cardiac and Vasculature    Essential hypertension (Chronic)    Atrial fibrillation (Chronic)    Chronic combined systolic and diastolic CHF (congestive heart failure) - Primary (Chronic)    Nonrheumatic mitral valve regurgitation (Chronic)    Overview     Eccentric (posteriorly directed) and therefore severe            Coag and Thromboembolic    Chronic anticoagulation    Overview     Has been on coumadin for CVA prophylaxis with AF - never switched to DOAC because, as family states, he was told his AF was valvular.  This is actually not the case  - as 'valvular af' implies rheumatic mitral stenosis (and not regurgitation, which is what he has)              Recommendations/plans:    As previously recommended by Dr. Piña, I will refer him to structural heart clinic for consideration of possible MitraClip.  Unfortunately, he is now off of much of his guideline directed medical therapy for his cardiomyopathy due to hypovolemia and hypotension.  Luckily, this has not caused him to develop heart failure symptoms or signs or volume overload.  No changes in medical therapy at this time.  Continue Entresto 24/26 mg in the a.m. and 49/51 mg in the p.m.  Continue Eliquis for stroke prophylaxis in the setting of permanent atrial fibrillation.  He is rate controlled without rate lowering medications.  We reviewed signs and symptoms of acute CHF and what to report including rapid weight gain and shortness of breath/orthopnea/PND.  Daily weights.  Heart healthy low-sodium diet.    Next follow-up to be determined by Dr. Piña/structural heart clinic.    I spent 34 minutes caring for Riley on this date of service. This time includes time spent by me in the following activities: preparing for the visit, reviewing tests, performing a medically appropriate examination and/or evaluation, counseling and educating the patient/family/caregiver, referring and communicating with other health care professionals, and care coordination

## 2024-06-20 DIAGNOSIS — I50.42 CHRONIC COMBINED SYSTOLIC AND DIASTOLIC CHF (CONGESTIVE HEART FAILURE): Primary | Chronic | ICD-10-CM

## 2024-06-20 DIAGNOSIS — I50.22 CHRONIC HFREF (HEART FAILURE WITH REDUCED EJECTION FRACTION): ICD-10-CM

## 2024-06-20 RX ORDER — SACUBITRIL AND VALSARTAN 24; 26 MG/1; MG/1
1 TABLET, FILM COATED ORAL 2 TIMES DAILY
Qty: 60 TABLET | Refills: 11 | Status: SHIPPED | OUTPATIENT
Start: 2024-06-20

## 2024-08-19 ENCOUNTER — OFFICE VISIT (OUTPATIENT)
Dept: CARDIOLOGY | Facility: CLINIC | Age: 84
End: 2024-08-19
Payer: MEDICARE

## 2024-08-19 VITALS
SYSTOLIC BLOOD PRESSURE: 136 MMHG | HEIGHT: 69 IN | BODY MASS INDEX: 27.11 KG/M2 | DIASTOLIC BLOOD PRESSURE: 76 MMHG | HEART RATE: 67 BPM | OXYGEN SATURATION: 98 % | WEIGHT: 183 LBS

## 2024-08-19 DIAGNOSIS — I34.0 NONRHEUMATIC MITRAL VALVE REGURGITATION: Primary | Chronic | ICD-10-CM

## 2024-08-19 DIAGNOSIS — I95.1 ORTHOSTATIC HYPOTENSION: ICD-10-CM

## 2024-08-19 DIAGNOSIS — I48.21 PERMANENT ATRIAL FIBRILLATION: Chronic | ICD-10-CM

## 2024-08-19 DIAGNOSIS — Z79.01 CHRONIC ANTICOAGULATION: ICD-10-CM

## 2024-08-19 DIAGNOSIS — I50.42 CHRONIC COMBINED SYSTOLIC AND DIASTOLIC CHF (CONGESTIVE HEART FAILURE): Chronic | ICD-10-CM

## 2024-08-19 PROCEDURE — 1160F RVW MEDS BY RX/DR IN RCRD: CPT | Performed by: INTERNAL MEDICINE

## 2024-08-19 PROCEDURE — 93000 ELECTROCARDIOGRAM COMPLETE: CPT | Performed by: INTERNAL MEDICINE

## 2024-08-19 PROCEDURE — 1159F MED LIST DOCD IN RCRD: CPT | Performed by: INTERNAL MEDICINE

## 2024-08-19 PROCEDURE — 3075F SYST BP GE 130 - 139MM HG: CPT | Performed by: INTERNAL MEDICINE

## 2024-08-19 PROCEDURE — 99214 OFFICE O/P EST MOD 30 MIN: CPT | Performed by: INTERNAL MEDICINE

## 2024-08-19 PROCEDURE — 3078F DIAST BP <80 MM HG: CPT | Performed by: INTERNAL MEDICINE

## 2024-08-19 RX ORDER — SACUBITRIL AND VALSARTAN 24; 26 MG/1; MG/1
1 TABLET, FILM COATED ORAL 2 TIMES DAILY
COMMUNITY

## 2024-08-20 NOTE — PROGRESS NOTES
Subjective:     Encounter Date:08/19/2024      Patient ID: Riley Foote is a 83 y.o. male.    Chief Complaint: Follow-up mitral regurgitation, CHF  History of Present Illness  The patient is an 83-year-old male who presents for follow-up of mitral regurgitation. He is accompanied by daughter and granddaughter (OLEG Bettencourt).    He has been followed for chronic systolic heart failure with severe mitral regurgitation. When initially diagnosed, guideline-directed medical therapy for systolic heart failure was implemented and titrated. He has remained NYHA Class I and has not shown any signs of heart failure on examination. He also has permanent atrial fibrillation. Right and left heart catheterization performed in 10/2023 showed normal coronary arteries with an LVEF of 38% and severe mitral regurgitation.    Since his last visit on 06/19/2024 with [facility] Russo, he experienced issues with hypovolemia and hypotension, requiring the cessation of most heart failure therapies. Fortunately, he did not develop heart failure as a result. He was on a low dose of Entresto in the morning and a moderate dose at night during his last visit, with no other changes made at that time. He remains on Eliquis for stroke protection. He has been referred to the structural heart clinic for transcatheter-based microtherapy.    He experienced a drop in blood pressure after increasing his Entresto dosage, leading to episodes of dizziness upon standing. Despite attempts to gradually increase the dosage, he did not tolerate it well. He is currently on a small dose of Entresto twice daily, which he mostly tolerates, although he still experiences occasional dizziness. For example, he felt woozy during a recent baseball game but did not faint.    He is also on Flomax twice daily for urinary tract infections, which worsen when he stops taking it. Previous use of Lanoxin reportedly led to pyelonephritis due to urinary retention, so it  was discontinued. Hytrin was also tried but was less effective than Flomax. He is under the care of a specialist in [place], [place], for this.    He experiences shortness of breath during physical activity, forcing him to stop, but this is not a new symptom. His breathing has not worsened compared to 3 or 6 months ago. He does not experience shortness of breath while lying down, nor does he have swelling or weight gain. He occasionally experiences chest pain, which resolves with deep breaths. He continues to take Eliquis without any issues.    His family inquires about the potential benefits of mitral valve treatment for his shortness of breath and whether it could improve his heart function. They are considering whether to proceed with the treatment now or wait. His daughter and granddaughter both note that his condition improved after starting Entresto and expressed concern about discontinuing it.    He developed gynecomastia from spironolactone, so it was discontinued. He also stopped taking Farxiga due to recurrent urinary tract infections and episodes of syncope or low blood pressure.         The following portions of the patient's history were reviewed and updated as appropriate: allergies, current medications, past family history, past medical history, past social history, past surgical history, and problem list.    Review of Systems   Constitutional: Negative for malaise/fatigue.   Cardiovascular:  Positive for dyspnea on exertion. Negative for chest pain, claudication, leg swelling, near-syncope, orthopnea, palpitations, paroxysmal nocturnal dyspnea and syncope.   Respiratory:  Negative for shortness of breath.    Hematologic/Lymphatic: Does not bruise/bleed easily.           Current Outpatient Medications:     albuterol (PROVENTIL) (2.5 MG/3ML) 0.083% nebulizer solution, Take 2.5 mg by nebulization Every 4 (Four) Hours As Needed for Wheezing or Shortness of Air., Disp: 120 each, Rfl: 11    apixaban  (ELIQUIS) 5 MG tablet tablet, Take 1 tablet by mouth 2 (Two) Times a Day. *CAN START TAKING THIS TOMORROW NIGHT (11/23 PM), AND TAKE IT TWICE A DAY EVERY DAY THEREAFTER, Disp: 60 tablet, Rfl: 11    Apoaequorin (PREVAGEN PO), Take  by mouth., Disp: , Rfl:     aspirin 81 MG chewable tablet, Chew 1 tablet Daily., Disp: , Rfl:     azelastine (ASTELIN) 0.1 % nasal spray, 2 sprays into the nostril(s) as directed by provider 2 (Two) Times a Day. Use in each nostril as directed, Disp: , Rfl:     esomeprazole (nexIUM) 40 MG capsule, Take 1 capsule by mouth Every Morning Before Breakfast., Disp: , Rfl:     FLUoxetine (PROzac) 20 MG capsule, Take 1 capsule by mouth Daily., Disp: , Rfl:     fluticasone (FLONASE) 50 MCG/ACT nasal spray, 2 sprays into the nostril(s) as directed by provider Daily., Disp: , Rfl:     Fluticasone-Umeclidin-Vilant (Trelegy Ellipta) 200-62.5-25 MCG/ACT aerosol powder , 1 puff Daily., Disp: , Rfl:     gabapentin (NEURONTIN) 300 MG capsule, Take 2 capsules by mouth Daily. At bedtime, Disp: , Rfl:     guaiFENesin (MUCINEX) 600 MG 12 hr tablet, Take 2 tablets by mouth 2 (Two) Times a Day., Disp: , Rfl:     Misc Natural Products (OSTEO BI-FLEX ADV JOINT SHIELD PO), Take 1 tablet by mouth Daily., Disp: , Rfl:     Multiple Vitamins-Minerals (CENTRUM SILVER) tablet, 1 tablet Daily., Disp: , Rfl:     NON FORMULARY, CPAP and 02 gets this from [facility], Disp: , Rfl:     sacubitril-valsartan (Entresto) 24-26 MG tablet, Take 1 tablet by mouth 2 (Two) Times a Day., Disp: , Rfl:     tamsulosin (FLOMAX) 0.4 MG capsule 24 hr capsule, Take 1 capsule by mouth 2 (Two) Times a Day., Disp: , Rfl:     vitamin B-12 (CYANOCOBALAMIN) 1000 MCG tablet, Take 1 tablet by mouth Daily., Disp: , Rfl:     vitamin C (ASCORBIC ACID) 500 MG tablet, Take 1 tablet by mouth Daily., Disp: , Rfl:        Objective:      Vitals:    08/19/24 1423   BP: 136/76   Pulse: 67   SpO2: 98%     Vitals and nursing note reviewed.   Constitutional:        General: Not in acute distress.     Appearance: Not in distress.   Neck:      Vascular: No JVD or JVR. JVD normal.   Pulmonary:      Effort: Pulmonary effort is normal.      Breath sounds: Normal breath sounds.   Cardiovascular:      Normal rate. Irregularly irregular rhythm.      Murmurs: There is a grade 2/6 holosystolic murmur. Located at the apex and left axilla.      No gallop.  No rub.   Pulses:     Intact distal pulses.   Edema:     Peripheral edema absent.   Skin:     General: Skin is warm and dry.   Neurological:      Mental Status: Alert, oriented to person, place, and time and oriented to person, place and time.       Physical Exam      Lab Review:         ECG 12 Lead    Date/Time: 8/19/2024 2:32 PM  Performed by: Kannan Piña MD    Authorized by: Kannan Piña MD  Comparison: compared with previous ECG from 6/19/2024  Similar to previous ECG  Rhythm: atrial fibrillation  Rate: normal  BPM: 67  QRS axis: left  Other findings comments: Cannot rule out anterior infarct, age undetermined    Clinical impression: abnormal EKG        Results  Imaging  Right and left heart catheterization showed normal coronary arteries with an LVEF of 38% with severe mitral regurgitation.      Results for orders placed during the hospital encounter of 08/24/23    Adult Transthoracic Echo Complete w/ Color, Spectral and Contrast if necessary per protocol    Interpretation Summary    Left ventricular systolic function is moderately decreased. Left ventricular ejection fraction appears to be 36 - 40%.    The left ventricular cavity is mildly dilated (LVIDd 6.0cm).    Severe mitral valve regurgitation is present with a posteriorly-directed jet noted.    Moderate aortic valve regurgitation is present.    Estimated right ventricular systolic pressure from tricuspid regurgitation is normal (<35 mmHg).    Normal size right ventricle, with mildly reduced systolic function noted.    Compared to prior exam from 3/10/2023, no  significant change.  The mitral regurgitation, by definition, severe due to its eccentric nature (posteriorly directed).        Assessment/Plan:     Problem List Items Addressed This Visit          Cardiac and Vasculature    Atrial fibrillation (Chronic)    Chronic combined systolic and diastolic CHF (congestive heart failure) (Chronic)    Relevant Medications    sacubitril-valsartan (Entresto) 24-26 MG tablet    Nonrheumatic mitral valve regurgitation - Primary (Chronic)    Overview     Eccentric (posteriorly directed) and therefore severe         Relevant Medications    sacubitril-valsartan (Entresto) 24-26 MG tablet    Other Relevant Orders    Adult Transthoracic Echo Complete w/ Color, Spectral and Contrast if necessary per protocol    ECG 12 Lead       Coag and Thromboembolic    Chronic anticoagulation    Overview     Has been on coumadin for CVA prophylaxis with AF - never switched to DOAC because, as family states, he was told his AF was valvular.  This is actually not the case - as 'valvular af' implies rheumatic mitral stenosis (and not regurgitation, which is what he has)          Other Visit Diagnoses       Orthostatic hypotension                  Recommendations/plans:    Assessment & Plan  1. Chronic Systolic and diastolic congestive heart Failure (non-ischemic): established, stable.  He has remained NYHA I and has not manifested any signs of heart failure on exam.   -He will continue on low dose Entresto twice daily for now, but other therapies previously tried have resulted and other side effects or worsened symptomatic orthostatic hypotension.  -SGLT2 inhibitor was discontinued due to recurrent UTIs  -Higher doses of Entresto worsened orthostasis  -Continue to monitor for seen signs of volume overload; has never required diuretic therapy and does not appear to need any at present      2. Severe Mitral Regurgitation: Established, stable.  He does have some chronic and stable exertional dyspnea this  "certainly could be a symptom from this condition.  Previously, given his advanced age but clinically stable status, we have not considered open heart surgery for valve repair/replacement.  Now, more minimally invasive options are locally so the patient presents with family today to discuss them.  -Transcatheter [id number] repair (with either the MitraClip or Hanna Tahira) was discussed in great detail, and education materials were provided to the family and the patient.   -We did discuss the fact that the primary proven benefit of transcatheter [id number] repair comes from the coapt to trial, demonstrating a significant reduction in recurrent hospitalizations for decompensated heart failure.  The Mr. Foote does have a dilated cardiomyopathy with a last known EF of 35 to 40%, he has never been volume overloaded or required hospitalization for decompensated CHF.  This potentially needs he would not experience the best proven benefit of these therapies, though certainly we discussed that many patients do experience lessened exertional dyspnea when the mitral regurgitation is decreased.  -We also discussed the usual postoperative course the patient's with cardiomyopathy and mitral regurgitation, whether undergoing valvular intervention via surgical or transcatheter based approach.  Oftentimes, because the left ventricle has been \"compensating\" for the regurgitant volume, taking away the \"pop-off valve\" that is the mitral regurgitation ultimately allows the ventricle to stop overcompensating and reveal it is true intrinsic function, which often is actually more depressed than it currently appears.  This can sometimes lead to more clinically significant symptoms of heart failure, though typically these can be managed with diuretic therapy and close outpatient management for period of a few months until ventricular function can actually recover.    For now, I advised the patient and his family that we will review his " case at our weekly structural heart meeting and will also repeat a transthoracic echocardiogram (at it has been almost a year since his last 1) to reassess ventricular size, function, and regurgitation of both the mitral and aortic valves  -Once that has been assessed and multidisciplinary discussions have been had, we may call and offer him a MAURISIO for better anatomic assessment of feasibility for transcatheter [id number] repair, in conjunction or followed by visit with one of our CT surgeons      3. Permanent Atrial Fibrillation: Stable, rate controlled.  He will continue taking Eliquis for stroke protection. No issues with Eliquis were reported during the visit.    4. Orthostasis.  Orthostasis is likely related to the current medication regimen, particularly Entresto and Flomax.  Before discontinuing Entresto, patient and family prefer to discuss again with his urologist to see if there are other options he could try in place of Flomax.  If not, certainly I would be willing to discontinue his Entresto and replace it with other GDMT (like valsartan or losartan) that would be less likely to induce clinically significant orthostatic hypotension.          Kannan Piña MD  08/19/2024  15:28 CDT    Transcribed from ambient dictation for Kannan Piña MD by Kannan Piña MD.  08/20/24   13:07 CDT    Patient or patient representative verbalized consent to the visit recording.

## 2024-09-06 ENCOUNTER — HOSPITAL ENCOUNTER (OUTPATIENT)
Dept: CARDIOLOGY | Facility: HOSPITAL | Age: 84
Discharge: HOME | End: 2024-09-06
Payer: MEDICARE

## 2024-09-06 VITALS
WEIGHT: 183 LBS | BODY MASS INDEX: 27.11 KG/M2 | SYSTOLIC BLOOD PRESSURE: 128 MMHG | DIASTOLIC BLOOD PRESSURE: 74 MMHG | HEIGHT: 69 IN

## 2024-09-06 DIAGNOSIS — I34.0 NONRHEUMATIC MITRAL VALVE REGURGITATION: Chronic | ICD-10-CM

## 2024-09-06 LAB
BH CV ECHO MEAS - AO MAX PG: 13 MMHG
BH CV ECHO MEAS - AO MEAN PG: 7 MMHG
BH CV ECHO MEAS - AO V2 MAX: 180 CM/SEC
BH CV ECHO MEAS - AO V2 VTI: 39.8 CM
BH CV ECHO MEAS - AVA(I,D): 2.5 CM2
BH CV ECHO MEAS - EDV(CUBED): 151.4 ML
BH CV ECHO MEAS - EDV(MOD-SP2): 102 ML
BH CV ECHO MEAS - EDV(MOD-SP4): 101 ML
BH CV ECHO MEAS - EF(MOD-BP): 37.2 %
BH CV ECHO MEAS - EF(MOD-SP2): 26.2 %
BH CV ECHO MEAS - EF(MOD-SP4): 44.3 %
BH CV ECHO MEAS - ESV(CUBED): 78.4 ML
BH CV ECHO MEAS - ESV(MOD-SP2): 75.3 ML
BH CV ECHO MEAS - ESV(MOD-SP4): 56.3 ML
BH CV ECHO MEAS - FS: 19.7 %
BH CV ECHO MEAS - IVS/LVPW: 1.05 CM
BH CV ECHO MEAS - IVSD: 1.1 CM
BH CV ECHO MEAS - LA DIMENSION: 6.8 CM
BH CV ECHO MEAS - LAT PEAK E' VEL: 13.2 CM/SEC
BH CV ECHO MEAS - LV DIASTOLIC VOL/BSA (35-75): 50.8 CM2
BH CV ECHO MEAS - LV MASS(C)D: 222.8 GRAMS
BH CV ECHO MEAS - LV MAX PG: 6.1 MMHG
BH CV ECHO MEAS - LV MEAN PG: 3 MMHG
BH CV ECHO MEAS - LV SYSTOLIC VOL/BSA (12-30): 28.3 CM2
BH CV ECHO MEAS - LV V1 MAX: 123 CM/SEC
BH CV ECHO MEAS - LV V1 VTI: 26.3 CM
BH CV ECHO MEAS - LVIDD: 5.3 CM
BH CV ECHO MEAS - LVIDS: 4.3 CM
BH CV ECHO MEAS - LVOT AREA: 3.8 CM2
BH CV ECHO MEAS - LVOT DIAM: 2.2 CM
BH CV ECHO MEAS - LVPWD: 1.05 CM
BH CV ECHO MEAS - MED PEAK E' VEL: 9.9 CM/SEC
BH CV ECHO MEAS - MR MAX PG: 101.8 MMHG
BH CV ECHO MEAS - MR MAX VEL: 504.2 CM/SEC
BH CV ECHO MEAS - MV DEC SLOPE: 567.5 CM/SEC2
BH CV ECHO MEAS - MV DEC TIME: 0.26 SEC
BH CV ECHO MEAS - MV E MAX VEL: 134 CM/SEC
BH CV ECHO MEAS - MV MAX PG: 7.2 MMHG
BH CV ECHO MEAS - MV MEAN PG: 2 MMHG
BH CV ECHO MEAS - MV P1/2T: 77.4 MSEC
BH CV ECHO MEAS - MV V2 VTI: 36.6 CM
BH CV ECHO MEAS - MVA(P1/2T): 2.8 CM2
BH CV ECHO MEAS - MVA(VTI): 2.7 CM2
BH CV ECHO MEAS - PA V2 MAX: 91.7 CM/SEC
BH CV ECHO MEAS - RAP SYSTOLE: 3 MMHG
BH CV ECHO MEAS - RV MAX PG: 1.83 MMHG
BH CV ECHO MEAS - RV V1 MAX: 67.6 CM/SEC
BH CV ECHO MEAS - RVDD: 3.9 CM
BH CV ECHO MEAS - RVSP: 20.8 MMHG
BH CV ECHO MEAS - SV(LVOT): 100 ML
BH CV ECHO MEAS - SV(MOD-SP2): 26.7 ML
BH CV ECHO MEAS - SV(MOD-SP4): 44.7 ML
BH CV ECHO MEAS - SVI(LVOT): 50.3 ML/M2
BH CV ECHO MEAS - SVI(MOD-SP2): 13.4 ML/M2
BH CV ECHO MEAS - SVI(MOD-SP4): 22.5 ML/M2
BH CV ECHO MEAS - TAPSE (>1.6): 1.97 CM
BH CV ECHO MEAS - TR MAX PG: 17.8 MMHG
BH CV ECHO MEAS - TR MAX VEL: 211 CM/SEC
BH CV ECHO MEASUREMENTS AVERAGE E/E' RATIO: 11.6
BH CV XLRA - RV BASE: 4.3 CM
LEFT ATRIUM VOLUME INDEX: 136.7 ML/M2
LEFT ATRIUM VOLUME: 272 ML
SINUS: 3.1 CM
STJ: 2.6 CM

## 2024-09-06 PROCEDURE — 93306 TTE W/DOPPLER COMPLETE: CPT

## 2024-09-10 LAB
BH CV ECHO MEAS - AO MAX PG: 13 MMHG
BH CV ECHO MEAS - AO MEAN PG: 7 MMHG
BH CV ECHO MEAS - AO V2 MAX: 180 CM/SEC
BH CV ECHO MEAS - AO V2 VTI: 39.8 CM
BH CV ECHO MEAS - AVA(I,D): 2.5 CM2
BH CV ECHO MEAS - EDV(CUBED): 151.4 ML
BH CV ECHO MEAS - EDV(MOD-SP2): 102 ML
BH CV ECHO MEAS - EDV(MOD-SP4): 101 ML
BH CV ECHO MEAS - EF(MOD-SP4): 44.3 %
BH CV ECHO MEAS - ESV(CUBED): 78.4 ML
BH CV ECHO MEAS - ESV(MOD-SP2): 75.3 ML
BH CV ECHO MEAS - ESV(MOD-SP4): 56.3 ML
BH CV ECHO MEAS - FS: 19.7 %
BH CV ECHO MEAS - IVS/LVPW: 1.05 CM
BH CV ECHO MEAS - IVSD: 1.1 CM
BH CV ECHO MEAS - LA DIMENSION: 6.8 CM
BH CV ECHO MEAS - LAT PEAK E' VEL: 13.2 CM/SEC
BH CV ECHO MEAS - LV DIASTOLIC VOL/BSA (35-75): 50.8 CM2
BH CV ECHO MEAS - LV MASS(C)D: 222.8 GRAMS
BH CV ECHO MEAS - LV MAX PG: 6.1 MMHG
BH CV ECHO MEAS - LV MEAN PG: 3 MMHG
BH CV ECHO MEAS - LV SYSTOLIC VOL/BSA (12-30): 28.3 CM2
BH CV ECHO MEAS - LV V1 MAX: 123 CM/SEC
BH CV ECHO MEAS - LV V1 VTI: 26.3 CM
BH CV ECHO MEAS - LVIDD: 5.3 CM
BH CV ECHO MEAS - LVIDS: 4.3 CM
BH CV ECHO MEAS - LVOT AREA: 3.8 CM2
BH CV ECHO MEAS - LVOT DIAM: 2.2 CM
BH CV ECHO MEAS - LVPWD: 1.05 CM
BH CV ECHO MEAS - MED PEAK E' VEL: 9.9 CM/SEC
BH CV ECHO MEAS - MR MAX PG: 101.8 MMHG
BH CV ECHO MEAS - MR MAX VEL: 504.2 CM/SEC
BH CV ECHO MEAS - MV DEC SLOPE: 567.5 CM/SEC2
BH CV ECHO MEAS - MV DEC TIME: 0.26 SEC
BH CV ECHO MEAS - MV E MAX VEL: 134 CM/SEC
BH CV ECHO MEAS - MV MAX PG: 7.2 MMHG
BH CV ECHO MEAS - MV MEAN PG: 2 MMHG
BH CV ECHO MEAS - MV P1/2T: 77.4 MSEC
BH CV ECHO MEAS - MV V2 VTI: 36.6 CM
BH CV ECHO MEAS - MVA(P1/2T): 2.8 CM2
BH CV ECHO MEAS - MVA(VTI): 2.7 CM2
BH CV ECHO MEAS - PA V2 MAX: 91.7 CM/SEC
BH CV ECHO MEAS - RAP SYSTOLE: 3 MMHG
BH CV ECHO MEAS - RV MAX PG: 1.83 MMHG
BH CV ECHO MEAS - RV V1 MAX: 67.6 CM/SEC
BH CV ECHO MEAS - RVDD: 3.9 CM
BH CV ECHO MEAS - RVSP: 20.8 MMHG
BH CV ECHO MEAS - SV(LVOT): 100 ML
BH CV ECHO MEAS - SV(MOD-SP2): 26.7 ML
BH CV ECHO MEAS - SV(MOD-SP4): 44.7 ML
BH CV ECHO MEAS - SVI(LVOT): 50.3 ML/M2
BH CV ECHO MEAS - SVI(MOD-SP2): 13.4 ML/M2
BH CV ECHO MEAS - SVI(MOD-SP4): 22.5 ML/M2
BH CV ECHO MEAS - TAPSE (>1.6): 1.97 CM
BH CV ECHO MEAS - TR MAX PG: 17.8 MMHG
BH CV ECHO MEAS - TR MAX VEL: 211 CM/SEC
BH CV ECHO MEASUREMENTS AVERAGE E/E' RATIO: 11.6
BH CV XLRA - RV BASE: 4.3 CM
LEFT ATRIUM VOLUME INDEX: 136.7 ML/M2
LEFT ATRIUM VOLUME: 272 ML
SINUS: 3.1 CM
STJ: 2.6 CM

## 2024-09-13 ENCOUNTER — TELEPHONE (OUTPATIENT)
Dept: CARDIOLOGY | Facility: CLINIC | Age: 84
End: 2024-09-13
Payer: MEDICARE

## 2024-09-13 NOTE — TELEPHONE ENCOUNTER
Call to the pt to let him know that the heart team met this morning and would like him to see one of the CT surgeons. He verbalized understanding and is agreeable.

## 2024-09-30 ENCOUNTER — OFFICE VISIT (OUTPATIENT)
Dept: CARDIAC SURGERY | Facility: CLINIC | Age: 84
End: 2024-09-30
Payer: MEDICARE

## 2024-09-30 VITALS
SYSTOLIC BLOOD PRESSURE: 130 MMHG | WEIGHT: 184.4 LBS | HEIGHT: 69 IN | DIASTOLIC BLOOD PRESSURE: 68 MMHG | HEART RATE: 66 BPM | OXYGEN SATURATION: 95 % | BODY MASS INDEX: 27.31 KG/M2

## 2024-09-30 DIAGNOSIS — I34.0 NONRHEUMATIC MITRAL VALVE REGURGITATION: Primary | Chronic | ICD-10-CM

## 2024-09-30 DIAGNOSIS — I50.42 CHRONIC COMBINED SYSTOLIC AND DIASTOLIC CHF (CONGESTIVE HEART FAILURE): Chronic | ICD-10-CM

## 2024-09-30 NOTE — PROGRESS NOTES
Cardiac Surgery Consultation    Referring Physician: Dr. Kannan Piña    Primary Care Physician: Dr. Stefano Sun    Chief Complaint   Patient presents with    Mitral Valve Regurgitation     New TAVR patient         Subjective     History of Present Illness  The patient is an 83-year-old male who presents for evaluation of mitral valve regurgitation. He is accompanied by his daughter.    He has been aware of a leaky valve since he was 14 years old, which he believes has progressively worsened. He also has a history of rheumatic fever and a heart murmur. Despite these conditions, he has not experienced significant issues.     He has atrial fibrillation that is constant and occasionally experiences supraventricular tachycardia (SVT), which seems to subside when he lies on his left side. He occasionally experiences shortness of breath during light activities such as picking up sticks and more recently has been with daily activities including his frequent coughing.. He has never taken blood pressure medication but has found Entresto beneficial. He underwent a heart catheterization in October 2023.    He maintains an active lifestyle, playing golf three to four times a week previously without experiencing shortness of breath or fatigue, more recently he has required breaks and can only play if not cart path only. However, he requires oxygen at night and uses a percussion vest a few times daily due to a persistent cough.     He has no history of surgery on his heart, lungs, chest, or blood vessels, and reports no trauma to his chest or broken ribs.      Review of Systems   Constitutional:  Positive for fatigue. Negative for activity change and unexpected weight change.   Respiratory:  Positive for cough and shortness of breath. Negative for chest tightness.    Cardiovascular:  Negative for chest pain and leg swelling.        A complete review of systems was performed, is negative except stated above.    Past Medical  History:   Diagnosis Date    Anxiety     Arrhythmia     Arthritis     Atrial fibrillation     Bladder cancer     BPH (benign prostatic hyperplasia)     CHF (congestive heart failure)     COPD (chronic obstructive pulmonary disease)     COVID-19     GERD (gastroesophageal reflux disease)     Heart murmur     HLD (hyperlipidemia)     Hypertension     Mild aortic insufficiency     Mild mitral regurgitation by prior echocardiogram     Mitral valve prolapse     Multinodular non-toxic goiter 12/15/2016    Peptic ulcer     Peripheral neuropathy     Pneumonia     Rheumatic fever     Sleep apnea     cpap at hs     Past Surgical History:   Procedure Laterality Date    APPENDECTOMY      BLADDER SURGERY      CARDIAC CATHETERIZATION N/A 10/11/2023    Procedure: Coronary angiography;  Surgeon: Kannan Piña MD;  Location:  [facility] CATH [facility]VASIVE LOCATION;  Service: Cardiology;  Laterality: N/A;    CARDIAC CATHETERIZATION N/A 10/11/2023    Procedure: Percutaneous Coronary Intervention;  Surgeon: Kannan Piña MD;  Location:  [facility];  Service: Cardiology;  Laterality: N/A;    CARDIAC CATHETERIZATION N/A 10/11/2023    Procedure: Right Heart Cath;  Surgeon: Kannan Piña MD;  Location:  [facility]ATH [facility];  Service: Cardiology;  Laterality: N/A;    COLONOSCOPY  01/26/2016    hyperplastic polyp, diverticulosis    COLONOSCOPY N/A 02/01/2021    Procedure: COLONOSCOPY WITH ANESTHESIA;  Surgeon: Frederick Adamson MD;  Location: [facility] ENDOSCOPY;  Service: Gastroenterology;  Laterality: N/A;  preop; hx of polyps  postop; diverticulosis  PCP Stefano Sun     TONSILLECTOMY      TOTAL KNEE ARTHROPLASTY Left     VASECTOMY       Family History   Problem Relation Age of Onset    Heart disease Mother     No Known Problems Father     Colon cancer Neg Hx      Social History     Tobacco Use    Smoking status: Former     Current packs/day: 0.00     Average packs/day: 1 pack/day for 15.0 years (15.0 ttl pk-yrs)      Types: Cigarettes     Start date:      Quit date:      Years since quittin.7     Passive exposure: Past    Smokeless tobacco: Never   Vaping Use    Vaping status: Never Used   Substance Use Topics    Alcohol use: Yes     Comment: occ    Drug use: Not Currently     Current Outpatient Medications   Medication Sig Dispense Refill    albuterol (PROVENTIL) (2.5 MG/3ML) 0.083% nebulizer solution Take 2.5 mg by nebulization Every 4 (Four) Hours As Needed for Wheezing or Shortness of Air. 120 each 11    apixaban (ELIQUIS) 5 MG tablet tablet Take 1 tablet by mouth 2 (Two) Times a Day. *CAN START TAKING THIS TOMORROW NIGHT ( PM), AND TAKE IT TWICE A DAY EVERY DAY THEREAFTER 60 tablet 11    Apoaequorin (PREVAGEN PO) Take  by mouth.      aspirin 81 MG chewable tablet Chew 1 tablet Daily.      azelastine (ASTELIN) 0.1 % nasal spray Administer 2 sprays into the nostril(s) as directed by provider 2 (Two) Times a Day. Use in each nostril as directed      esomeprazole (nexIUM) 40 MG capsule Take 1 capsule by mouth Every Morning Before Breakfast.      FLUoxetine (PROzac) 20 MG capsule Take 1 capsule by mouth Daily.      fluticasone (FLONASE) 50 MCG/ACT nasal spray Administer 2 sprays into the nostril(s) as directed by provider Daily.      Fluticasone-Umeclidin-Vilant (Trelegy Ellipta) 200-62.5-25 MCG/ACT aerosol powder  1 puff Daily.      gabapentin (NEURONTIN) 300 MG capsule Take 2 capsules by mouth Daily. At bedtime      guaiFENesin (MUCINEX) 600 MG 12 hr tablet Take 2 tablets by mouth 2 (Two) Times a Day.      Misc Natural Products (OSTEO BI-FLEX ADV JOINT SHIELD PO) Take 1 tablet by mouth Daily.      Multiple Vitamins-Minerals (CENTRUM SILVER) tablet 1 tablet Daily.      O2 (OXYGEN) Inhale Every Night.      sacubitril-valsartan (Entresto) 24-26 MG tablet Take 1 tablet by mouth 2 (Two) Times a Day.      tamsulosin (FLOMAX) 0.4 MG capsule 24 hr capsule Take 1 capsule by mouth 2 (Two) Times a Day.      vitamin  "B-12 (CYANOCOBALAMIN) 1000 MCG tablet Take 1 tablet by mouth Daily.      vitamin C (ASCORBIC ACID) 500 MG tablet Take 1 tablet by mouth Daily.       No current facility-administered medications for this visit.     Allergies:  Ciprofloxacin, Farxiga [dapagliflozin], and Spironolactone    Objective      Vital Signs  Visit Vitals  /68 (BP Location: Right arm, Patient Position: Sitting, Cuff Size: Adult)   Pulse 66   Ht 175.3 cm (69\")   Wt 83.6 kg (184 lb 6.4 oz)   SpO2 95%   BMI 27.23 kg/m²         Physical Exam  Constitutional:       General: He is not in acute distress.     Appearance: He is well-developed. He is not diaphoretic.   HENT:      Head: Normocephalic and atraumatic.      Right Ear: External ear normal.      Left Ear: External ear normal.   Eyes:      General:         Right eye: No discharge.         Left eye: No discharge.      Pupils: Pupils are equal, round, and reactive to light.   Neck:      Vascular: No JVD.      Trachea: No tracheal deviation.   Cardiovascular:      Rate and Rhythm: Normal rate. Rhythm irregular.      Heart sounds: Normal heart sounds. No murmur heard.  Pulmonary:      Effort: Pulmonary effort is normal. No respiratory distress.      Breath sounds: Normal breath sounds. No stridor. No wheezing.   Abdominal:      General: There is no distension.      Palpations: Abdomen is soft.      Tenderness: There is no abdominal tenderness. There is no guarding.   Musculoskeletal:         General: No tenderness or deformity. Normal range of motion.      Cervical back: Normal range of motion and neck supple.   Skin:     General: Skin is warm and dry.      Capillary Refill: Capillary refill takes less than 2 seconds.      Coloration: Skin is not pale.      Findings: No erythema or rash.   Neurological:      Mental Status: He is alert and oriented to person, place, and time.      Motor: No abnormal muscle tone.      Coordination: Coordination normal.   Psychiatric:         Behavior: Behavior " normal.         Thought Content: Thought content normal.         Judgment: Judgment normal.        Physical Exam      Results Review:     WBC   Date Value Ref Range Status   10/11/2023 7.12 3.40 - 10.80 10*3/mm3 Final   03/27/2023 9.0 3.4 - 10.8 x10E3/uL Final   05/02/2022 9.5 4.8 - 10.8 K/uL Final     RBC   Date Value Ref Range Status   10/11/2023 5.09 4.14 - 5.80 10*6/mm3 Final   03/27/2023 4.80 4.14 - 5.80 x10E6/uL Final     Hemoglobin   Date Value Ref Range Status   10/11/2023 15.3 13.0 - 17.7 g/dL Final   05/20/2022 11.5 (L) 14.0 - 18.0 g/dL Final     Hematocrit   Date Value Ref Range Status   10/11/2023 47.6 37.5 - 51.0 % Final   05/20/2022 35.0 (L) 42.0 - 52.0 % Final     MCV   Date Value Ref Range Status   10/11/2023 93.5 79.0 - 97.0 fL Final   05/02/2022 93.9 80.0 - 94.0 fL Final     MCH   Date Value Ref Range Status   10/11/2023 30.1 26.6 - 33.0 pg Final   05/02/2022 30.5 27.0 - 31.0 pg Final     MCHC   Date Value Ref Range Status   10/11/2023 32.1 31.5 - 35.7 g/dL Final   05/02/2022 32.5 (L) 33.0 - 37.0 g/dL Final     RDW   Date Value Ref Range Status   10/11/2023 13.3 12.3 - 15.4 % Final   05/02/2022 12.7 11.5 - 14.5 % Final     RDW-SD   Date Value Ref Range Status   10/11/2023 45.8 37.0 - 54.0 fl Final     MPV   Date Value Ref Range Status   10/11/2023 10.2 6.0 - 12.0 fL Final   05/02/2022 10.5 9.4 - 12.4 fL Final     Platelets   Date Value Ref Range Status   10/11/2023 153 140 - 450 10*3/mm3 Final   05/02/2022 164 130 - 400 K/uL Final     Neutrophil Rel %   Date Value Ref Range Status   05/02/2022 63.8 50.0 - 65.0 % Final     Neutrophil %   Date Value Ref Range Status   05/12/2023 68.8 42.7 - 76.0 % Final     Lymphocyte Rel %   Date Value Ref Range Status   05/02/2022 20.3 20.0 - 40.0 % Final     Lymphocyte %   Date Value Ref Range Status   05/12/2023 16.8 (L) 19.6 - 45.3 % Final     Monocyte Rel %   Date Value Ref Range Status   05/02/2022 11.2 (H) 0.0 - 10.0 % Final     Monocyte %   Date Value Ref  Range Status   05/12/2023 9.4 5.0 - 12.0 % Final     Eosinophil Rel %   Date Value Ref Range Status   05/02/2022 3.2 0.0 - 5.0 % Final     Eosinophil %   Date Value Ref Range Status   05/12/2023 3.6 0.3 - 6.2 % Final     Basophil Rel %   Date Value Ref Range Status   05/02/2022 0.7 0.0 - 1.0 % Final     Basophil %   Date Value Ref Range Status   05/12/2023 0.8 0.0 - 1.5 % Final     Immature Grans %   Date Value Ref Range Status   05/12/2023 0.6 (H) 0.0 - 0.5 % Final     Neutrophils Absolute   Date Value Ref Range Status   05/02/2022 6.1 1.5 - 7.5 K/uL Final     Neutrophils, Absolute   Date Value Ref Range Status   05/12/2023 5.41 1.70 - 7.00 10*3/mm3 Final     Lymphocytes Absolute   Date Value Ref Range Status   05/02/2022 1.9 1.1 - 4.5 K/uL Final     Lymphocytes, Absolute   Date Value Ref Range Status   05/12/2023 1.32 0.70 - 3.10 10*3/mm3 Final     Monocytes Absolute   Date Value Ref Range Status   05/02/2022 1.10 (H) 0.00 - 0.90 K/uL Final     Monocytes, Absolute   Date Value Ref Range Status   05/12/2023 0.74 0.10 - 0.90 10*3/mm3 Final     Eosinophils Absolute   Date Value Ref Range Status   05/02/2022 0.30 0.00 - 0.60 K/uL Final     Eosinophils, Absolute   Date Value Ref Range Status   05/12/2023 0.28 0.00 - 0.40 10*3/mm3 Final     Basophils Absolute   Date Value Ref Range Status   05/02/2022 0.10 0.00 - 0.20 K/uL Final     Basophils, Absolute   Date Value Ref Range Status   05/12/2023 0.06 0.00 - 0.20 10*3/mm3 Final     Immature Grans, Absolute   Date Value Ref Range Status   05/12/2023 0.05 0.00 - 0.05 10*3/mm3 Final   05/02/2022 0.1 K/uL Final     nRBC   Date Value Ref Range Status   05/12/2023 0.0 0.0 - 0.2 /100 WBC Final     Glucose   Date Value Ref Range Status   10/11/2023 116 (H) 65 - 99 mg/dL Final   05/02/2022 93 74 - 109 mg/dL Final     Sodium   Date Value Ref Range Status   10/11/2023 138 136 - 145 mmol/L Final   05/02/2022 142 136 - 145 mmol/L Final     Sodium, Arterial   Date Value Ref Range  Status   10/11/2023 140 136 - 145 mmol/L Final     Sodium, Venous   Date Value Ref Range Status   10/11/2023 140 136 - 145 mmol/L Final     Potassium   Date Value Ref Range Status   10/11/2023 4.2 3.5 - 5.2 mmol/L Final     Comment:     Slight hemolysis detected by analyzer. Results may be affected.   05/02/2022 5.1 (H) 3.5 - 5.0 mmol/L Final     Potassium, Venous   Date Value Ref Range Status   10/11/2023 3.9 3.5 - 5.2 mmol/L Final     CO2   Date Value Ref Range Status   10/11/2023 24.0 22.0 - 29.0 mmol/L Final   05/02/2022 25 22 - 29 mmol/L Final     Chloride   Date Value Ref Range Status   10/11/2023 104 98 - 107 mmol/L Final   05/02/2022 104 98 - 111 mmol/L Final     Anion Gap   Date Value Ref Range Status   10/11/2023 10.0 5.0 - 15.0 mmol/L Final   05/02/2022 13 7 - 19 mmol/L Final     Creatinine   Date Value Ref Range Status   10/11/2023 0.97 0.76 - 1.27 mg/dL Final   05/02/2022 1 0.5 - 1.2 mg/dL Final     BUN   Date Value Ref Range Status   10/11/2023 18 8 - 23 mg/dL Final   05/02/2022 17 8 - 23 mg/dL Final     BUN/Creatinine Ratio   Date Value Ref Range Status   10/11/2023 18.6 7.0 - 25.0 Final     Calcium   Date Value Ref Range Status   10/11/2023 9.6 8.6 - 10.5 mg/dL Final   05/02/2022 10.4 (H) 8.8 - 10.2 mg/dL Final     eGFR Non  Am   Date Value Ref Range Status   05/02/2022 >60 >60 Final     Comment:     This calculation may be inaccurate for patients under the age of 18 years.  For ages 18 and older, a GFR >60 mL/min/1.73m2 (not corrected for weight) is  valid for stable renal function.     Alkaline Phosphatase   Date Value Ref Range Status   06/29/2023 78 39 - 117 U/L Final   05/02/2022 56 40 - 130 U/L Final     Total Protein   Date Value Ref Range Status   06/29/2023 7.4 6.0 - 8.5 g/dL Final   05/02/2022 7.7 6.6 - 8.7 g/dL Final     ALT (SGPT)   Date Value Ref Range Status   06/29/2023 22 1 - 41 U/L Final   05/02/2022 25 5 - 41 U/L Final     AST (SGOT)   Date Value Ref Range Status   06/29/2023  20 1 - 40 U/L Final   05/02/2022 20 5 - 40 U/L Final     Total Bilirubin   Date Value Ref Range Status   06/29/2023 0.7 0.0 - 1.2 mg/dL Final   05/02/2022 0.8 0.2 - 1.2 mg/dL Final     Albumin   Date Value Ref Range Status   06/29/2023 4.1 3.5 - 5.2 g/dL Final   05/02/2022 4.6 3.5 - 5.2 g/dL Final     Globulin   Date Value Ref Range Status   06/29/2023 3.3 gm/dL Final     A/G Ratio   Date Value Ref Range Status   05/12/2023 1.1 0.7 - 1.7 Final        I reviewed the patient's clinical results and discussed with patient.    Results  I personally reviewed transthoracic echo performed on 9/10/2024, the following is my interpretation:  Mildly reduced LV function, there is severe posteriorly directed mitral regurgitation with likely a prolapsing or flail small segment of A2, there is mild AI pressure half-time is greater than 800 and on cross-section this appears to be central, left atrium is severely enlarged greater than 9 cm      Assessment & Plan       Assessment & Plan    Mr. Foote is a 83-year-old male who presents with severe degenerative mitral regurgitation, he has a posteriorly directed jet likely from prolapse of the anterior leaflet.  He has a severely dilated left atrium 9 cm, longstanding persistent atrial fibrillation.  He has decreased LVEF previously around 35% but more recently improved to around 45% after initiation of GDMT.  He also has moderate central aortic insufficiency on previous TTE's, on most recent this was improved to mild with quantitative measurements of all being clearly within the mild category.  He has class II NYHA heart failure symptoms with shortness of breath while playing golf and sometimes when doing work around his yard such as picking up sticks.  He is otherwise very healthy and active for his age.    Today I discussed with Mr. Foote and his family members the natural history of mitral regurgitation especially in the setting of decreased LVEF and atrial fibrillation.  He has 2  indications for mitral valve surgery given his decreased EF and A-fib.  His PA pressures were not elevated on the right heart cath in October 2023.  I discussed with him treatment options and I have recommended surgical repair.  We discussed surgical repair versus transcatheter and given his degenerative nature of MR he is unlikely to qualify for transcatheter [id number] repair as he is not prohibitive risk.  We discussed this at length and the rationale behind this.  I would recommend surgical repair versus replacement.  I have offered a minimally invasive approach and discussed my experience with this with them.  We discussed possibility of conversion sternotomy they understand and want to proceed with possible minimally invasive approach.  Given the previous AI I would like to better assess this with a MAURISIO as well as the etiology of the mitral regurgitation.  I will discuss that further with Dr. Piña.  I discussed preoperative operative postoperative expectations of both minimally invasive and sternotomy mitral valve repair versus replacement, they understand and agree to proceed with surgery.  Given the extensive dilation of the left atrium I do not believe it is prudent to proceed with Maze procedure but will close left atrial appendage at time of procedure.  We discussed the risk of surgery including but not limited to bleeding, infection, injury to major organs or vessels, chronic heart failure, arrhythmias, need for pacemaker, prolonged ventilation, renal failure, stroke, risk of anesthesia, risk of sternal wound or bone healing problems, reoperation, and/or death.  I calculate his patient-specific STS risk or discussed this with him as well, he understands and agrees to proceed with surgery.    Will need to complete his workup with CTA to assess for access for peripheral bypass, MAURISIO to assess AI and etiology of MR and a dental clearance.  Will plan on surgery soon thereafter.  Thank you for trusting with  the care of Mr. Foote.  Please do not hesitate to call questions or concerns.        Braydon Thacker MD   Cardiothoracic Surgeon        Patient or patient representative verbalized consent for the use of Ambient Listening during the visit with  Braydon Thacker MD for chart documentation. 9/30/2024  14:41 CDT

## 2024-09-30 NOTE — LETTER
October 4, 2024       No Recipients    Patient: Riley Foote   YOB: 1940   Date of Visit: 9/30/2024       Dear Cirilo Sun MD,    Riley Foote was in my office today. Below are the relevant portions of my assessment and plan of care.    Mr. Foote is a 83-year-old male who presents with severe degenerative mitral regurgitation, he has a posteriorly directed jet likely from prolapse of the anterior leaflet.  He has a severely dilated left atrium 9 cm, longstanding persistent atrial fibrillation.  He has decreased LVEF previously around 35% but more recently improved to around 45% after initiation of GDMT.  He also has moderate central aortic insufficiency on previous TTE's, on most recent this was improved to mild with quantitative measurements of all being clearly within the mild category.  He has class II NYHA heart failure symptoms with shortness of breath while playing golf and sometimes when doing work around his yard such as picking up sticks.  He is otherwise very healthy and active for his age.    Today I discussed with Mr. Foote and his family members the natural history of mitral regurgitation especially in the setting of decreased LVEF and atrial fibrillation.  He has 2 indications for mitral valve surgery given his decreased EF and A-fib.  His PA pressures were not elevated on the right heart cath in October 2023.  I discussed with him treatment options and I have recommended surgical repair.  We discussed surgical repair versus transcatheter and given his degenerative nature of MR he is unlikely to qualify for transcatheter [id number] repair as he is not prohibitive risk.  We discussed this at length and the rationale behind this.  I would recommend surgical repair versus replacement.  I have offered a minimally invasive approach and discussed my experience with this with them.  We discussed possibility of conversion sternotomy they understand and want to proceed with possible  minimally invasive approach.  Given the previous AI I would like to better assess this with a MAURISIO as well as the etiology of the mitral regurgitation.  I will discuss that further with Dr. Piña.  I discussed preoperative operative postoperative expectations of both minimally invasive and sternotomy mitral valve repair versus replacement, they understand and agree to proceed with surgery.  Given the extensive dilation of the left atrium I do not believe it is prudent to proceed with Maze procedure but will close left atrial appendage at time of procedure.  We discussed the risk of surgery including but not limited to bleeding, infection, injury to major organs or vessels, chronic heart failure, arrhythmias, need for pacemaker, prolonged ventilation, renal failure, stroke, risk of anesthesia, risk of sternal wound or bone healing problems, reoperation, and/or death.  I calculate his patient-specific STS risk or discussed this with him as well, he understands and agrees to proceed with surgery.    Will need to complete his workup with CTA to assess for access for peripheral bypass, MAURISIO to assess AI and etiology of MR and a dental clearance.  Will plan on surgery soon thereafter.  Thank you for trusting with the care of Mr. Foote.  Please do not hesitate to call questions or concerns.         Sincerely,        Braydon Thacker MD        CC:   No Recipients

## 2024-10-04 ENCOUNTER — TELEPHONE (OUTPATIENT)
Dept: CARDIOLOGY | Facility: CLINIC | Age: 84
End: 2024-10-04
Payer: MEDICARE

## 2024-10-04 ENCOUNTER — PREP FOR SURGERY (OUTPATIENT)
Dept: OTHER | Facility: HOSPITAL | Age: 84
End: 2024-10-04
Payer: MEDICARE

## 2024-10-04 DIAGNOSIS — I34.0 SEVERE MITRAL REGURGITATION: Primary | ICD-10-CM

## 2024-10-04 RX ORDER — SODIUM CHLORIDE 9 MG/ML
40 INJECTION, SOLUTION INTRAVENOUS AS NEEDED
OUTPATIENT
Start: 2024-10-04

## 2024-10-04 RX ORDER — SODIUM CHLORIDE 9 MG/ML
20 INJECTION, SOLUTION INTRAVENOUS CONTINUOUS
OUTPATIENT
Start: 2024-10-04

## 2024-10-04 RX ORDER — SODIUM CHLORIDE 0.9 % (FLUSH) 0.9 %
10 SYRINGE (ML) INJECTION EVERY 12 HOURS SCHEDULED
OUTPATIENT
Start: 2024-10-04

## 2024-10-04 RX ORDER — SODIUM CHLORIDE 0.9 % (FLUSH) 0.9 %
10 SYRINGE (ML) INJECTION AS NEEDED
OUTPATIENT
Start: 2024-10-04

## 2024-10-04 NOTE — TELEPHONE ENCOUNTER
Call to the pt to let him know that Dr. Piña would like to do a MAURISIO. He is agreeable to proceed.

## 2024-10-15 ENCOUNTER — HOSPITAL ENCOUNTER (OUTPATIENT)
Dept: CT IMAGING | Facility: HOSPITAL | Age: 84
Discharge: HOME | End: 2024-10-15
Payer: MEDICARE

## 2024-10-15 DIAGNOSIS — I34.0 NONRHEUMATIC MITRAL VALVE REGURGITATION: Chronic | ICD-10-CM

## 2024-10-15 PROCEDURE — 71275 CT ANGIOGRAPHY CHEST: CPT

## 2024-10-15 PROCEDURE — 74174 CTA ABD&PLVS W/CONTRAST: CPT

## 2024-10-15 PROCEDURE — 25510000001 IOPAMIDOL PER 1 ML: Performed by: NURSE PRACTITIONER

## 2024-10-15 RX ORDER — IOPAMIDOL 755 MG/ML
100 INJECTION, SOLUTION INTRAVASCULAR
Status: COMPLETED | OUTPATIENT
Start: 2024-10-15 | End: 2024-10-15

## 2024-10-15 RX ADMIN — IOPAMIDOL 100 ML: 755 INJECTION, SOLUTION INTRAVENOUS at 15:31

## 2024-10-18 ENCOUNTER — ANESTHESIA (OUTPATIENT)
Dept: CARDIOLOGY | Facility: HOSPITAL | Age: 84
End: 2024-10-18
Payer: MEDICARE

## 2024-10-18 ENCOUNTER — HOSPITAL ENCOUNTER (OUTPATIENT)
Dept: CARDIOLOGY | Facility: HOSPITAL | Age: 84
Discharge: HOME | End: 2024-10-18
Payer: MEDICARE

## 2024-10-18 ENCOUNTER — ANESTHESIA EVENT (OUTPATIENT)
Dept: CARDIOLOGY | Facility: HOSPITAL | Age: 84
End: 2024-10-18
Payer: MEDICARE

## 2024-10-18 VITALS
DIASTOLIC BLOOD PRESSURE: 76 MMHG | WEIGHT: 186.8 LBS | OXYGEN SATURATION: 97 % | HEART RATE: 71 BPM | BODY MASS INDEX: 26.74 KG/M2 | SYSTOLIC BLOOD PRESSURE: 131 MMHG | RESPIRATION RATE: 14 BRPM | TEMPERATURE: 97.1 F | HEIGHT: 70 IN

## 2024-10-18 DIAGNOSIS — I34.0 SEVERE MITRAL REGURGITATION: ICD-10-CM

## 2024-10-18 LAB
BH CV ECHO MEAS - AO MAX PG: 5.6 MMHG
BH CV ECHO MEAS - AO MEAN PG: 3 MMHG
BH CV ECHO MEAS - AO V2 MAX: 118 CM/SEC
BH CV ECHO MEAS - AO V2 VTI: 24.5 CM
BH CV ECHO MEAS - AVA(I,D): 3.2 CM2
BH CV ECHO MEAS - LV MAX PG: 5.1 MMHG
BH CV ECHO MEAS - LV MEAN PG: 3 MMHG
BH CV ECHO MEAS - LV V1 MAX: 113 CM/SEC
BH CV ECHO MEAS - LV V1 VTI: 22.9 CM
BH CV ECHO MEAS - LVOT AREA: 3.5 CM2
BH CV ECHO MEAS - LVOT DIAM: 2.1 CM
BH CV ECHO MEAS - MR MAX PG: 98 MMHG
BH CV ECHO MEAS - MR MAX VEL: 495 CM/SEC
BH CV ECHO MEAS - MR MEAN PG: 57 MMHG
BH CV ECHO MEAS - MR MEAN VEL: 345 CM/SEC
BH CV ECHO MEAS - MR VTI: 161 CM
BH CV ECHO MEAS - MV MAX PG: 3.2 MMHG
BH CV ECHO MEAS - MV MEAN PG: 1.5 MMHG
BH CV ECHO MEAS - MV V2 VTI: 20.2 CM
BH CV ECHO MEAS - MVA(VTI): 3.9 CM2
BH CV ECHO MEAS - RF(MV,LVOT)(1DIAM): 0.44 CM
BH CV ECHO MEAS - SV(LVOT): 79.3 ML
BH CV ECHO MEAS - SVI(LVOT): 39.2 ML/M2
BH CV ECHO MEAS - TR MAX PG: 20.1 MMHG
BH CV ECHO MEAS - TR MAX VEL: 224 CM/SEC

## 2024-10-18 PROCEDURE — 93325 DOPPLER ECHO COLOR FLOW MAPG: CPT

## 2024-10-18 PROCEDURE — 25010000002 PROPOFOL 1000 MG/100ML EMULSION: Performed by: NURSE ANESTHETIST, CERTIFIED REGISTERED

## 2024-10-18 PROCEDURE — 93320 DOPPLER ECHO COMPLETE: CPT

## 2024-10-18 PROCEDURE — 25010000002 LIDOCAINE PF 2% 2 % SOLUTION: Performed by: NURSE ANESTHETIST, CERTIFIED REGISTERED

## 2024-10-18 RX ORDER — SODIUM CHLORIDE 9 MG/ML
20 INJECTION, SOLUTION INTRAVENOUS CONTINUOUS
Status: DISCONTINUED | OUTPATIENT
Start: 2024-10-18 | End: 2024-10-19 | Stop reason: HOSPADM

## 2024-10-18 RX ORDER — SODIUM CHLORIDE 0.9 % (FLUSH) 0.9 %
10 SYRINGE (ML) INJECTION EVERY 12 HOURS SCHEDULED
Status: DISCONTINUED | OUTPATIENT
Start: 2024-10-18 | End: 2024-10-19 | Stop reason: HOSPADM

## 2024-10-18 RX ORDER — SODIUM CHLORIDE 0.9 % (FLUSH) 0.9 %
10 SYRINGE (ML) INJECTION AS NEEDED
Status: DISCONTINUED | OUTPATIENT
Start: 2024-10-18 | End: 2024-10-19 | Stop reason: HOSPADM

## 2024-10-18 RX ORDER — SODIUM CHLORIDE 0.9 % (FLUSH) 0.9 %
10 SYRINGE (ML) INJECTION EVERY 12 HOURS SCHEDULED
OUTPATIENT
Start: 2024-10-18

## 2024-10-18 RX ORDER — SODIUM CHLORIDE 0.9 % (FLUSH) 0.9 %
10 SYRINGE (ML) INJECTION AS NEEDED
OUTPATIENT
Start: 2024-10-18

## 2024-10-18 RX ORDER — SODIUM CHLORIDE 9 MG/ML
40 INJECTION, SOLUTION INTRAVENOUS AS NEEDED
Status: DISCONTINUED | OUTPATIENT
Start: 2024-10-18 | End: 2024-10-19 | Stop reason: HOSPADM

## 2024-10-18 RX ORDER — PROPOFOL 10 MG/ML
INJECTION, EMULSION INTRAVENOUS AS NEEDED
Status: DISCONTINUED | OUTPATIENT
Start: 2024-10-18 | End: 2024-10-18 | Stop reason: SURG

## 2024-10-18 RX ORDER — LIDOCAINE HYDROCHLORIDE 20 MG/ML
INJECTION, SOLUTION EPIDURAL; INFILTRATION; INTRACAUDAL; PERINEURAL AS NEEDED
Status: DISCONTINUED | OUTPATIENT
Start: 2024-10-18 | End: 2024-10-18 | Stop reason: SURG

## 2024-10-18 RX ADMIN — PROPOFOL 75 MG: 10 INJECTION, EMULSION INTRAVENOUS at 11:12

## 2024-10-18 RX ADMIN — PROPOFOL 25 MG: 10 INJECTION, EMULSION INTRAVENOUS at 11:28

## 2024-10-18 RX ADMIN — PROPOFOL 25 MG: 10 INJECTION, EMULSION INTRAVENOUS at 11:25

## 2024-10-18 RX ADMIN — PROPOFOL 25 MG: 10 INJECTION, EMULSION INTRAVENOUS at 11:18

## 2024-10-18 RX ADMIN — PROPOFOL 25 MG: 10 INJECTION, EMULSION INTRAVENOUS at 11:16

## 2024-10-18 RX ADMIN — PROPOFOL 25 MG: 10 INJECTION, EMULSION INTRAVENOUS at 11:15

## 2024-10-18 RX ADMIN — PROPOFOL 25 MG: 10 INJECTION, EMULSION INTRAVENOUS at 11:21

## 2024-10-18 RX ADMIN — PROPOFOL 75 MG: 10 INJECTION, EMULSION INTRAVENOUS at 11:10

## 2024-10-18 RX ADMIN — LIDOCAINE HYDROCHLORIDE 100 MG: 20 INJECTION, SOLUTION EPIDURAL; INFILTRATION; INTRACAUDAL; PERINEURAL at 11:10

## 2024-10-18 NOTE — ANESTHESIA POSTPROCEDURE EVALUATION
Patient: Riley Foote    Procedure Summary       Date: 10/18/24 Room / Location: [facility] CATH LAB; [facility] CARDIOLOGY    Anesthesia Start: 1106 Anesthesia Stop: 1143    Procedure: ADULT TRANSESOPHAGEAL ECHO (MAURISIO) W/ CONT IF NECESSARY PER PROTOCOL Diagnosis:       Severe mitral regurgitation      (Valvular Disease)      (Mitral Valve Assessment)    Scheduled Providers: Kannan Piña MD Provider: Rene Zurita CRNA    Anesthesia Type: MAC ASA Status: 3            Anesthesia Type: MAC    Vitals  Vitals Value Taken Time   /77 10/18/24 1107   Temp     Pulse 57 10/18/24 1107   Resp     SpO2 99 % 10/18/24 1107           Post Anesthesia Care and Evaluation    Patient location during evaluation: PHASE II  Patient participation: complete - patient participated  Level of consciousness: awake and sleepy but conscious  Pain score: 0  Pain management: adequate    Airway patency: patent  Anesthetic complications: No anesthetic complications    Cardiovascular status: acceptable  Respiratory status: acceptable  Hydration status: acceptable

## 2024-10-18 NOTE — ANESTHESIA PREPROCEDURE EVALUATION
Anesthesia Evaluation     Patient summary reviewed and Nursing notes reviewed   no history of anesthetic complications:   NPO Solid Status: > 8 hours  NPO Liquid Status: > 4 hours           Airway   Mallampati: II  No difficulty expected  Dental - normal exam     Pulmonary    (+) COPD,sleep apnea on CPAP  (-) pneumonia  Cardiovascular   Exercise tolerance: good (4-7 METS)    Patient on routine beta blocker and Beta blocker given within 24 hours of surgery    (+) hypertension, valvular problems/murmurs MVP, dysrhythmias Atrial Fib, hyperlipidemia  (-) angina, HOWARD      Neuro/Psych  (+) numbness, psychiatric history  (-) seizures, TIA, CVA  GI/Hepatic/Renal/Endo    (+) GERD, PUD  (-) liver disease, no renal disease, diabetes    Musculoskeletal (-) negative ROS    Abdominal    Substance History - negative use     OB/GYN negative ob/gyn ROS         Other                      Anesthesia Plan    ASA 3     MAC     intravenous induction     Anesthetic plan, risks, benefits, and alternatives have been provided, discussed and informed consent has been obtained with: patient.

## 2024-10-24 ENCOUNTER — TELEPHONE (OUTPATIENT)
Dept: PULMONOLOGY | Facility: CLINIC | Age: 84
End: 2024-10-24
Payer: MEDICARE

## 2024-10-24 NOTE — TELEPHONE ENCOUNTER
oK to cancel upcoming CT of the chest for pulmonary purposes.  Lung nodules were noted to be stable.  It appears that he already follows with cardiology and CT surgery.    Please fax results to PCP as well.

## 2024-10-24 NOTE — TELEPHONE ENCOUNTER
----- Message from Alanis LARIOS sent at 10/24/2024  8:30 AM CDT -----  Please see CTA Chest report and let me know if I can cancel scheduled CT Chest in Nov. Thanks, Alanis

## 2024-10-29 ENCOUNTER — TELEPHONE (OUTPATIENT)
Dept: CARDIAC SURGERY | Facility: CLINIC | Age: 84
End: 2024-10-29
Payer: MEDICARE

## 2024-10-29 NOTE — TELEPHONE ENCOUNTER
Called to offer patient OR date of 11/20/2024.  I spoke with his daughter Tiarra and she states she would like to discuss it with the patient and her family and call back and let us know if this OR date works for them.  We also discussed dental clearance letter and she states she will discuss this with the patient as well.  He does have some of his own native teeth therefore we will need a letter from his dentist.  If patient or his daughter returns call and they are okay with this date, I will put in surgery orders.

## 2024-10-29 NOTE — TELEPHONE ENCOUNTER
Patient's daughter called back and states they are still discussing proceeding with surgery and would like to hold off on tentative surgery date for now.  I did advise her that if that date does not work for her we will be happy to find a new date.  She verbalizes understanding and states she will return call after family has discussed surgery further.

## 2024-11-05 ENCOUNTER — TRANSCRIBE ORDERS (OUTPATIENT)
Dept: ADMINISTRATIVE | Facility: HOSPITAL | Age: 84
End: 2024-11-05
Payer: MEDICARE

## 2024-11-05 DIAGNOSIS — E04.1 THYROID NODULE: Primary | ICD-10-CM

## 2024-11-07 ENCOUNTER — OFFICE VISIT (OUTPATIENT)
Dept: PULMONOLOGY | Facility: CLINIC | Age: 84
End: 2024-11-07
Payer: MEDICARE

## 2024-11-07 VITALS
WEIGHT: 185 LBS | OXYGEN SATURATION: 97 % | SYSTOLIC BLOOD PRESSURE: 124 MMHG | HEART RATE: 64 BPM | DIASTOLIC BLOOD PRESSURE: 72 MMHG | BODY MASS INDEX: 26.48 KG/M2 | HEIGHT: 70 IN

## 2024-11-07 DIAGNOSIS — J43.8 OTHER EMPHYSEMA: Chronic | ICD-10-CM

## 2024-11-07 DIAGNOSIS — J47.9 BRONCHIECTASIS WITHOUT COMPLICATION: Primary | Chronic | ICD-10-CM

## 2024-11-07 DIAGNOSIS — G47.33 OBSTRUCTIVE SLEEP APNEA: Chronic | ICD-10-CM

## 2024-11-07 DIAGNOSIS — J96.11 CHRONIC RESPIRATORY FAILURE WITH HYPOXIA: Chronic | ICD-10-CM

## 2024-11-07 DIAGNOSIS — J30.9 ALLERGIC RHINITIS, UNSPECIFIED SEASONALITY, UNSPECIFIED TRIGGER: Chronic | ICD-10-CM

## 2024-11-07 DIAGNOSIS — K21.9 GASTROESOPHAGEAL REFLUX DISEASE, UNSPECIFIED WHETHER ESOPHAGITIS PRESENT: Chronic | ICD-10-CM

## 2024-11-07 PROCEDURE — 3074F SYST BP LT 130 MM HG: CPT | Performed by: NURSE PRACTITIONER

## 2024-11-07 PROCEDURE — 1159F MED LIST DOCD IN RCRD: CPT | Performed by: NURSE PRACTITIONER

## 2024-11-07 PROCEDURE — 1160F RVW MEDS BY RX/DR IN RCRD: CPT | Performed by: NURSE PRACTITIONER

## 2024-11-07 PROCEDURE — 99214 OFFICE O/P EST MOD 30 MIN: CPT | Performed by: NURSE PRACTITIONER

## 2024-11-07 PROCEDURE — 3078F DIAST BP <80 MM HG: CPT | Performed by: NURSE PRACTITIONER

## 2024-11-07 NOTE — PROGRESS NOTES
" OLEG Granda  [facility]  [address]            [place] [address]  Phone: [phone]  Fax: [phone]          Chief Complaint  Bronchiectasis without complication    Subjective     Riley Foote presents to [facility] for appointment.  History of Present Illness  The patient has known bronchiectasis (CPT, mucinex), chronic respiratory failure with hypoxia-nocturnal O2, GGO, SIMÓN, and emphysema.  The patient is using Trelegy, albuterol HFA/neb as needed.     He reports no new health concerns and has not experienced any illness or required hospitalization since his last visit. He has been using a chest vest and Mucinex as needed, and continues to use his Trelegy inhaler. He does not require any medication refills at this time. He is due for a breathing test during his next visit. He continues to use PAP device and remains active, engaging in golf. He reports no swelling in his legs.    He had a visit with the cardiothoracic surgery team.  He is being considered for mitral valve surgery.  He states that he is planning to get a second opinion.    Objective   Vital Signs:   /72   Pulse 64   Ht 177.8 cm (70\")   Wt 83.9 kg (185 lb)   SpO2 97% Comment: RA  BMI 26.54 kg/m²        Physical Exam  Vitals reviewed.   Constitutional:       General: He is not in acute distress.     Appearance: He is well-developed and overweight.   HENT:      Head: Normocephalic and atraumatic.   Eyes:      General: No scleral icterus.     Conjunctiva/sclera: Conjunctivae normal.      Pupils: Pupils are equal, round, and reactive to light.   Cardiovascular:      Rate and Rhythm: Normal rate.   Pulmonary:      Effort: Pulmonary effort is normal. No respiratory distress.      Breath sounds: Normal breath sounds. No wheezing or rales.   Musculoskeletal:         General: Normal range of motion.      Cervical back: Normal range of motion and neck " supple.   Skin:     General: Skin is warm and dry.   Neurological:      Mental Status: He is alert and oriented to person, place, and time.   Psychiatric:         Behavior: Behavior normal.         Thought Content: Thought content normal.         Judgment: Judgment normal.        Result Review :  The following data was reviewed by: OLEG Bermeo on 11/07/2024:  CT Angiogram Chest (10/15/2024 15:31)   IMPRESSION:  1. Marked dilation of the left atrium and the heart is enlarged.  Follow-up with cardiac echo is recommended. Reflux of contrast into the  intrahepatic IVC and hepatic veins indicating elevated right heart  pressures. No evidence of pulmonary edema or overt CHF.  2. Diffuse mixed changes of centrilobular and paraseptal emphysema. A  few tiny stable pulmonary nodules are noted. Mild interstitial fibrosis  in the lung bases, greatest at the periphery of the left lung base,  where there is also appears to be mild subpleural scarring.  3. Asymmetric bilateral left greater than right gynecomastia.  4. Heterogeneous appearance of the thyroid gland particular right lobe, with a at least 2 nodules or cysts within the right lobe. The largest measures 1.2 cm.  Rest/Exercise Pulse Ox Values          3/27/2023    13:30   Rest/Exercise Pulse Ox Results   Rest room air SAT % 98   Exercise room air SAT % 98     PFT Values          5/31/2024    10:00   Pre Drug PFT Results      FEV1 129   FEF 25-75% 243   FEV1/FVC 91   Other Tests PFT Results   DLCO 99   D/VAsb 88       Results for orders placed in visit on 05/31/24    Spirometry with Diffusion Capacity    Narrative  Spirometry with Diffusion Capacity    Performed by: Deepa Beck, RRT  Authorized by: Ilene Oliveros APRN  Pre Drug % Predicted  FVC: 104%  FEV1: 129%  FEF 25-75%: 243%  FEV1/FVC: 91%  DLCO: 99%  D/VAsb: 88%    Interpretation  Spirometry  Spirometry shows normal results. midflow is normal.  Review of FVL curve  Patient's  effort is normal.  Diffusion Capacity  The patient's diffusion capacity is normal.  Diffusion capacity is normal when corrected for alveolar volume.      Results for orders placed during the hospital encounter of 04/11/23    Pulmonary Function Test    [facility] - Pulmonary Function Test    [address] [place]  [place]  [address]  [phone]    Patient : Riley Foote  MRN : [id number]  CSN : [id number]  Pulmonologist : Antoine Mayes MD  Date : 4/11/2023    ______________________________________________________________________    Interpretation :  1.  Spirometry is within normal limits and in fact midflows are supranormal.  2.  Lung volumes reveal a decrease in expiratory reserve volume with a coexisting increase in inspiratory capacity and otherwise are within normal limits.  3.  There is a mild diffusion impairment even when corrected for alveolar volume.      Antoine Mayes MD             Assessment and Plan  Diagnoses and all orders for this visit:    1. Bronchiectasis without complication (Primary)  Overview:  CT chest 2/15/23 - Similar bronchiectasis with peribronchial thickening.    3/27/23 elevated IgM, decreased IgG subclass 2  3/27/23 IgA, IgE, IgG, IgG subclass 1, IgG subclass 3, IgG subclass 4 WNL  5/12/23 VICK, PE, FLC, Serum - no M spike, Kappa/Lambda ratio WNL     Trelegy, albuterol neb    CPT, Mucinex    Assessment & Plan:  Continue current treatment regimen.        2. Allergic rhinitis, unspecified seasonality, unspecified trigger  Overview:  Astelin, Flonase    Assessment & Plan:  Continue current treatment regimen.        3. Chronic respiratory failure with hypoxia  Overview:  Noc O2 2L    Assessment & Plan:  Continue chronic oxygen therapy.  The patient is benefiting from it and wishes to continue it.        4. Obstructive sleep apnea  Overview:  PAP+2L     Assessment & Plan:  Continue home PAP device. The patient is benefiting from it and wishes to continue it.    Continue chronic oxygen therapy.  The patient is benefiting from it and wishes to continue it.        5. Other emphysema  Overview:  3/27/23 alpha 1 MM    Trelegy-100, albuterol HFA/neb    Assessment & Plan:  Stable.   Continue bronchodilator therapy.   Continue to monitor DLCO via PFT.           6. Gastroesophageal reflux disease, unspecified whether esophagitis present  Overview:  Nexium     Assessment & Plan:  Continue current treatment regimen.            Follow Up  OLEG Bermeo  11/8/2024  16:22 CST    Return in about 6 months (around 5/7/2025) for FVL+DLCO, f/u with Christi .    Patient was given instructions and counseling regarding his condition or for health maintenance advice. Please see specific information pulled into the AVS if appropriate.     Please note that portions of this note were completed with a voice recognition program.    Patient or patient representative verbalized consent for the use of Ambient Listening during the visit with  OLEG Bermeo for chart documentation. 11/8/2024  16:17 CST

## 2024-11-12 LAB
BH CV ECHO MEAS - AO MAX PG: 5.6 MMHG
BH CV ECHO MEAS - AO MEAN PG: 3 MMHG
BH CV ECHO MEAS - AO V2 MAX: 118 CM/SEC
BH CV ECHO MEAS - AO V2 VTI: 24.5 CM
BH CV ECHO MEAS - AVA(I,D): 3.2 CM2
BH CV ECHO MEAS - LV MAX PG: 5.1 MMHG
BH CV ECHO MEAS - LV MEAN PG: 3 MMHG
BH CV ECHO MEAS - LV V1 MAX: 113 CM/SEC
BH CV ECHO MEAS - LV V1 VTI: 22.9 CM
BH CV ECHO MEAS - LVOT AREA: 3.5 CM2
BH CV ECHO MEAS - LVOT DIAM: 2.1 CM
BH CV ECHO MEAS - MR MAX PG: 98 MMHG
BH CV ECHO MEAS - MR MAX VEL: 495 CM/SEC
BH CV ECHO MEAS - MR MEAN PG: 57 MMHG
BH CV ECHO MEAS - MR MEAN VEL: 345 CM/SEC
BH CV ECHO MEAS - MR VTI: 161 CM
BH CV ECHO MEAS - MV MAX PG: 3.2 MMHG
BH CV ECHO MEAS - MV MEAN PG: 1.5 MMHG
BH CV ECHO MEAS - MV V2 VTI: 20.2 CM
BH CV ECHO MEAS - MVA(VTI): 3.9 CM2
BH CV ECHO MEAS - RF(MV,LVOT)(1DIAM): 0.44 CM
BH CV ECHO MEAS - SV(LVOT): 79.3 ML
BH CV ECHO MEAS - SVI(LVOT): 39.2 ML/M2
BH CV ECHO MEAS - TR MAX PG: 20.1 MMHG
BH CV ECHO MEAS - TR MAX VEL: 224 CM/SEC
LV EF 2D ECHO EST: 40 %
MR PISA EROA: 0.32 CM2
MV REGURGITANT FRACTION: 33 %
MV REGURGITANT VOLUME: 52 CC
PISA ALIASING VEL: 38.5 M/S
PISA RADIUS: 0.8 CM

## 2024-12-02 ENCOUNTER — HOSPITAL ENCOUNTER (OUTPATIENT)
Dept: NUCLEAR MEDICINE | Facility: HOSPITAL | Age: 84
Discharge: HOME | End: 2024-12-02
Payer: MEDICARE

## 2024-12-13 ENCOUNTER — TELEPHONE (OUTPATIENT)
Dept: CARDIOLOGY | Facility: CLINIC | Age: 84
End: 2024-12-13
Payer: MEDICARE

## 2024-12-13 NOTE — TELEPHONE ENCOUNTER
Call to the pt as Dr. Thacker requested to follow up on his second opinion. They have not seen anyone for a second opinion yet, but plan too. No plans made at this time.

## 2024-12-17 ENCOUNTER — HOSPITAL ENCOUNTER (OUTPATIENT)
Dept: NUCLEAR MEDICINE | Facility: HOSPITAL | Age: 84
Discharge: HOME | End: 2024-12-17
Payer: MEDICARE

## 2024-12-17 DIAGNOSIS — E04.1 THYROID NODULE: ICD-10-CM

## 2024-12-17 PROCEDURE — 34310000005 SODIUM IODIDE 3.7 MBQ CAPSULE: Performed by: FAMILY MEDICINE

## 2024-12-17 PROCEDURE — A9516 IODINE I-123 SOD IODIDE MIC: HCPCS | Performed by: FAMILY MEDICINE

## 2024-12-17 PROCEDURE — 78014 THYROID IMAGING W/BLOOD FLOW: CPT

## 2024-12-17 RX ORDER — SODIUM IODIDE I 123 100 UCI/1
1 CAPSULE, GELATIN COATED ORAL
Status: COMPLETED | OUTPATIENT
Start: 2024-12-17 | End: 2024-12-17

## 2024-12-17 RX ADMIN — SODIUM IODIDE I 123 1 CAPSULE: 100 CAPSULE, GELATIN COATED ORAL at 07:15

## 2024-12-18 ENCOUNTER — HOSPITAL ENCOUNTER (OUTPATIENT)
Dept: NUCLEAR MEDICINE | Facility: HOSPITAL | Age: 84
Discharge: HOME | End: 2024-12-18
Payer: MEDICARE

## 2024-12-27 ENCOUNTER — TELEMEDICINE (OUTPATIENT)
Dept: FAMILY MEDICINE CLINIC | Facility: CLINIC | Age: 84
End: 2024-12-27
Payer: MEDICARE

## 2024-12-27 DIAGNOSIS — R39.14 BENIGN PROSTATIC HYPERPLASIA WITH INCOMPLETE BLADDER EMPTYING: ICD-10-CM

## 2024-12-27 DIAGNOSIS — J06.9 UPPER RESPIRATORY TRACT INFECTION, UNSPECIFIED TYPE: Primary | ICD-10-CM

## 2024-12-27 DIAGNOSIS — J43.8 OTHER EMPHYSEMA: Chronic | ICD-10-CM

## 2024-12-27 DIAGNOSIS — N40.1 BENIGN PROSTATIC HYPERPLASIA WITH INCOMPLETE BLADDER EMPTYING: ICD-10-CM

## 2024-12-27 PROCEDURE — 99213 OFFICE O/P EST LOW 20 MIN: CPT | Performed by: NURSE PRACTITIONER

## 2024-12-27 RX ORDER — METHYLPREDNISOLONE 4 MG/1
TABLET ORAL
Qty: 21 TABLET | Refills: 0 | Status: SHIPPED | OUTPATIENT
Start: 2024-12-27

## 2024-12-27 RX ORDER — DOXYCYCLINE 100 MG/1
100 CAPSULE ORAL 2 TIMES DAILY
Qty: 20 CAPSULE | Refills: 0 | Status: SHIPPED | OUTPATIENT
Start: 2024-12-27

## 2024-12-27 RX ORDER — FINASTERIDE 5 MG/1
5 TABLET, FILM COATED ORAL DAILY
Qty: 90 TABLET | Refills: 4 | Status: SHIPPED | OUTPATIENT
Start: 2024-12-27

## 2024-12-27 NOTE — PROGRESS NOTES
OLEG Campbell  [facility]   Family Medicine  [address]  [place] [address]  Phone: [phone]  Fax: [phone]     Riley Foote is a 84 y.o. male.   : 1940    You have chosen to receive care through a telehealth visit.  Do you consent to use a video/audio connection for your medical care today? Yes    Pt located at Home and provider located at home office.     CC:   Chief Complaint   Patient presents with    URI        HPI: Riley Foote is a 84 y.o. male.    History of Present Illness    Previously treated [place] at [facility]. Copd, cough congestion, for 3 days. Unable to contact his PCP. Request treatment.       The following portions of the patient's history were reviewed and updated as appropriate: allergies, current medications, past family history, past medical history, past social history, past surgical history, and problem list.  Past Medical History:   Diagnosis Date    Anxiety     Arrhythmia     Arthritis     Atrial fibrillation     Bladder cancer     BPH (benign prostatic hyperplasia)     CHF (congestive heart failure)     COPD (chronic obstructive pulmonary disease)     COVID-19     GERD (gastroesophageal reflux disease)     Heart murmur     HLD (hyperlipidemia)     Hypertension     Mild aortic insufficiency     Mild mitral regurgitation by prior echocardiogram     Mitral valve prolapse     Multinodular non-toxic goiter 12/15/2016    Peptic ulcer     Peripheral neuropathy     Pneumonia     Rheumatic fever     Sleep apnea     cpap at hs     Family History   Problem Relation Age of Onset    Heart disease Mother     No Known Problems Father     Colon cancer Neg Hx      Social History     Socioeconomic History    Marital status:    Tobacco Use    Smoking status: Former     Current packs/day: 0.00     Average packs/day: 1 pack/day for 15.0 years (15.0 ttl pk-yrs)     Types: Cigarettes     Start date:      Quit date:      Years since quitting:  43.0     Passive exposure: Past    Smokeless tobacco: Never   Vaping Use    Vaping status: Never Used   Substance and Sexual Activity    Alcohol use: Yes     Comment: occ    Drug use: Not Currently    Sexual activity: Defer     Review of Systems   Constitutional:  Negative for appetite change, diaphoresis, fatigue and fever.   HENT:  Negative for ear pain, hearing loss, mouth sores, sinus pressure, sneezing, sore throat and voice change.    Eyes:  Negative for discharge, itching and visual disturbance.   Respiratory:  Positive for cough and wheezing. Negative for shortness of breath.    Cardiovascular:  Negative for chest pain and palpitations.   Gastrointestinal:  Negative for abdominal pain, diarrhea and vomiting.   Musculoskeletal:  Negative for arthralgias, back pain and myalgias.   Skin:  Negative for rash and wound.   Neurological:  Negative for dizziness, seizures, weakness, numbness and headache.   Hematological:  Negative for adenopathy. Does not bruise/bleed easily.   Psychiatric/Behavioral:  Negative for agitation, dysphoric mood, sleep disturbance and depressed mood. The patient is not nervous/anxious.      There were no vitals taken for this visit.  Physical Exam  Vitals and nursing note reviewed.   Constitutional:       Appearance: Normal appearance. He is well-developed.   HENT:      Head: Normocephalic and atraumatic.      Mouth/Throat:      Lips: Pink. No lesions.   Eyes:      General: Lids are normal. Vision grossly intact.      Conjunctiva/sclera: Conjunctivae normal.      Right eye: Right conjunctiva is not injected.      Left eye: Left conjunctiva is not injected.   Pulmonary:      Effort: Pulmonary effort is normal.      Breath sounds: Wheezing present.   Musculoskeletal:         General: Normal range of motion.      Cervical back: Full passive range of motion without pain, normal range of motion and neck supple.   Skin:     General: Skin is dry.   Neurological:      Mental Status: He is alert  and oriented to person, place, and time.      Motor: Motor function is intact.   Psychiatric:         Mood and Affect: Mood and affect normal.         Judgment: Judgment normal.       Physical Exam      Results      Assessment and Plan:   Diagnoses and all orders for this visit:    1. Upper respiratory tract infection, unspecified type (Primary)  -     methylPREDNISolone (MEDROL) 4 MG dose pack; Take as directed on package instructions.  Dispense: 21 tablet; Refill: 0  -     doxycycline (VIBRAMYCIN) 100 MG capsule; Take 1 capsule by mouth 2 (Two) Times a Day.  Dispense: 20 capsule; Refill: 0    2. Other emphysema    3. Benign prostatic hyperplasia with incomplete bladder emptying  -     finasteride (PROSCAR) 5 MG tablet; Take 1 tablet by mouth Daily.  Dispense: 90 tablet; Refill: 4          Assessment & Plan      I spent 25 minutes caring for Riley on this date of service. This time includes time spent by me in the following activities: preparing for the visit, reviewing tests, performing a medically appropriate examination and/or evaluation, counseling and educating the patient/family/caregiver, documenting information in the medical record, independently interpreting results and communicating that information with the patient/family/caregiver, care coordination, and ordering medications       OLEG Campbell   12/27/2024  15:39 CST    Patient or patient representative verbalized consent for the use of Ambient Listening during the visit with  OLEG Campbell for chart documentation. 12/27/2024  15:35 CST

## 2025-02-04 DIAGNOSIS — Z20.828 EXPOSURE TO INFLUENZA: Primary | ICD-10-CM

## 2025-02-04 RX ORDER — OSELTAMIVIR PHOSPHATE 75 MG/1
75 CAPSULE ORAL DAILY
Qty: 10 CAPSULE | Refills: 0 | Status: SHIPPED | OUTPATIENT
Start: 2025-02-04 | End: 2025-02-06

## 2025-02-06 ENCOUNTER — OFFICE VISIT (OUTPATIENT)
Dept: CARDIOLOGY | Facility: CLINIC | Age: 85
End: 2025-02-06
Payer: MEDICARE

## 2025-02-06 VITALS
BODY MASS INDEX: 26.05 KG/M2 | HEART RATE: 62 BPM | OXYGEN SATURATION: 97 % | DIASTOLIC BLOOD PRESSURE: 62 MMHG | SYSTOLIC BLOOD PRESSURE: 102 MMHG | WEIGHT: 182 LBS | HEIGHT: 70 IN

## 2025-02-06 DIAGNOSIS — I10 ESSENTIAL HYPERTENSION: Chronic | ICD-10-CM

## 2025-02-06 DIAGNOSIS — I48.21 PERMANENT ATRIAL FIBRILLATION: Chronic | ICD-10-CM

## 2025-02-06 DIAGNOSIS — Z01.810 PREOP CARDIOVASCULAR EXAM: ICD-10-CM

## 2025-02-06 DIAGNOSIS — I34.0 NONRHEUMATIC MITRAL VALVE REGURGITATION: Chronic | ICD-10-CM

## 2025-02-06 DIAGNOSIS — Z79.01 CHRONIC ANTICOAGULATION: ICD-10-CM

## 2025-02-06 DIAGNOSIS — I50.42 CHRONIC COMBINED SYSTOLIC AND DIASTOLIC CHF (CONGESTIVE HEART FAILURE): Primary | Chronic | ICD-10-CM

## 2025-02-06 PROBLEM — I50.22 CHRONIC HFREF (HEART FAILURE WITH REDUCED EJECTION FRACTION): Status: RESOLVED | Noted: 2023-10-12 | Resolved: 2025-02-06

## 2025-02-06 NOTE — PROGRESS NOTES
Subjective:     Encounter Date:02/06/2025      Patient ID: Riley Foote is a 84 y.o. male.    Chief Complaint: Follow-up mitral regurgitation, CHF, atrial fibrillation  History of Present Illness  84-year-old male returns today for follow-up of the above, accompanied by his granddaughter (Dayami Johnson, OLEG).     He has been followed for chronic systolic heart failure with severe mitral regurgitation. When initially diagnosed, guideline-directed medical therapy for systolic heart failure was implemented and titrated. He has remained NYHA Class I and has not shown any signs of heart failure on examination. He also has permanent atrial fibrillation. Right and left heart catheterization performed in 10/2023 showed normal coronary arteries with an LVEF of 38% and severe mitral regurgitation.    At his last visit with me on 9/19/2024, it was noted that he had issues with hypovolemia and hypotension, requiring the cessation of most of his GDMT for chronic systolic heart failure.  Thankfully, he had not manifested any other signs of volume overload since then.  There is also discussed then how he had been previously on Flomax twice a day for UTIs, which worsened whenever he would stop taking it.  At that visit, he did endorse shortness of breath requiring him to stop at a moderate degree of activity, but stated it was no worse than it had been 3 to 6 months prior.  And no orthopnea, swelling, rapid weight gains.    At that visit, the local availability of MitraClip therapy was discussed extensively.  A follow-up echocardiogram showed mild improvement in his ejection fraction, to 41 to 45%.  Is also noted that his left ventricular dimensions were in the normal range.  His case was discussed in a multidisciplinary fashion in structural heart conference, and he saw Dr. Thacker with CT surgery.  A subsequent MAURISIO was performed by myself.  Dr. Thacker offered the patient mitral valve repair, but that has not yet occurred  "as the patient and his family take some time to think about it as well as address other ongoing medical concerns first.    Returning today routine follow-up, unaccompanied by Dayami Johnson (APRN and patient's granddaughter), he states he is doing \"about the same.\"  They mention to me that he has been under the long-term care of Dr. Farr in [place], [place], for a bladder tumor. A TURP procedure is planned to manage recurrent infections. Reportedly, he has a history of pyelonephritis, which occurred during the summer. He has a history of bladder cancer, for which he underwent BCG installations. He is currently on finasteride. He is scheduled for a TURP procedure on 02/20/2025, and Dr. Farr has advised discontinuing Eliquis until the following Monday. He has previously been on Lovenox while taking Coumadin.    He reports experiencing shortness of breath, which he does not perceive as worsening. He has not engaged in golf since Halloween. He experiences rapid onset of breathlessness during activities such as digging or picking up sticks but does not experience this during routine household tasks. 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 He experienced syncopal episodes when his Entresto dosage was increased, leading to a reduction in his Flomax dosage. However, this resulted in another urinary tract infection. He was able to see Dr. Farr last week or the week before, who recommended discontinuing Flomax and increasing the finasteride dosage. Since reducing and discontinuing Flomax, his near syncopal episodes have significantly improved. He reports no new symptoms, chest pain, tightness, or pressure, except for a brief episode of chest pressure lasting 2 to 3 minutes this morning while in bed. He did not experience any radiation of the pain, increased shortness of breath, or palpitations during this episode. He reports no " recent syncopal episodes. He has never been hospitalized for congestive heart failure. He reports no new swelling or difficulty breathing while lying down.    Supplemental Information  His hearing aid battery is out, and the other one was eaten by the dog.    MEDICATIONS  Current: Entresto, finasteride, Eliquis  Discontinued: Flomax      The following portions of the patient's history were reviewed and updated as appropriate: allergies, current medications, past family history, past medical history, past social history, past surgical history, and problem list.    Review of Systems   Constitutional: Negative for malaise/fatigue.   Cardiovascular:  Positive for dyspnea on exertion. Negative for chest pain, claudication, leg swelling, near-syncope, orthopnea, palpitations, paroxysmal nocturnal dyspnea and syncope.   Respiratory:  Negative for shortness of breath.    Hematologic/Lymphatic: Does not bruise/bleed easily.           Current Outpatient Medications:     albuterol (PROVENTIL) (2.5 MG/3ML) 0.083% nebulizer solution, Take 2.5 mg by nebulization Every 4 (Four) Hours As Needed for Wheezing or Shortness of Air., Disp: 120 each, Rfl: 11    apixaban (ELIQUIS) 5 MG tablet tablet, Take 1 tablet by mouth 2 (Two) Times a Day. *CAN START TAKING THIS TOMORROW NIGHT (11/23 PM), AND TAKE IT TWICE A DAY EVERY DAY THEREAFTER, Disp: 60 tablet, Rfl: 11    Apoaequorin (PREVAGEN PO), Take  by mouth., Disp: , Rfl:     aspirin 81 MG chewable tablet, Chew 1 tablet Daily., Disp: , Rfl:     azelastine (ASTELIN) 0.1 % nasal spray, Administer 2 sprays into the nostril(s) as directed by provider 2 (Two) Times a Day. Use in each nostril as directed, Disp: , Rfl:     esomeprazole (nexIUM) 40 MG capsule, Take 1 capsule by mouth Every Morning Before Breakfast., Disp: , Rfl:     finasteride (PROSCAR) 5 MG tablet, Take 1 tablet by mouth Daily. (Patient taking differently: Take 1 tablet by mouth 2 (Two) Times a Day.), Disp: 90 tablet, Rfl: 4     FLUoxetine (PROzac) 20 MG capsule, Take 1 capsule by mouth Daily., Disp: , Rfl:     fluticasone (FLONASE) 50 MCG/ACT nasal spray, Administer 2 sprays into the nostril(s) as directed by provider Daily., Disp: , Rfl:     Fluticasone-Umeclidin-Vilant (Trelegy Ellipta) 200-62.5-25 MCG/ACT aerosol powder , 1 puff Daily., Disp: , Rfl:     gabapentin (NEURONTIN) 300 MG capsule, Take 2 capsules by mouth Daily. At bedtime, Disp: , Rfl:     guaiFENesin (MUCINEX) 600 MG 12 hr tablet, Take 2 tablets by mouth 2 (Two) Times a Day., Disp: , Rfl:     Misc Natural Products (OSTEO BI-FLEX ADV JOINT SHIELD PO), Take 1 tablet by mouth Daily., Disp: , Rfl:     Multiple Vitamins-Minerals (CENTRUM SILVER) tablet, 1 tablet Daily., Disp: , Rfl:     O2 (OXYGEN), Inhale 2 L/min Every Night., Disp: , Rfl:     sacubitril-valsartan (Entresto) 24-26 MG tablet, Take 1 tablet by mouth 2 (Two) Times a Day., Disp: , Rfl:     vitamin B-12 (CYANOCOBALAMIN) 1000 MCG tablet, Take 1 tablet by mouth Daily., Disp: , Rfl:     vitamin C (ASCORBIC ACID) 500 MG tablet, Take 1 tablet by mouth Daily., Disp: , Rfl:        Objective:      Vitals:    02/06/25 0833   BP: 102/62   Pulse: 62   SpO2: 97%     Vitals and nursing note reviewed.   Constitutional:       General: Not in acute distress.     Appearance: Not in distress.   Neck:      Vascular: No JVD or JVR. JVD normal.   Pulmonary:      Effort: Pulmonary effort is normal.      Breath sounds: Normal breath sounds.   Cardiovascular:      Normal rate. Irregularly irregular rhythm.      Murmurs: There is a grade 3/6 high frequency blowing holosystolic murmur at the apex.      No gallop.  No rub.   Pulses:     Intact distal pulses.   Edema:     Peripheral edema absent.   Skin:     General: Skin is warm and dry.   Neurological:      Mental Status: Alert, oriented to person, place, and time and oriented to person, place and time.       Physical Exam      Lab Review:         ECG 12 Lead    Date/Time: 2/6/2025 8:48  "AM  Performed by: Kannan Piña MD    Authorized by: Kannan Piña MD  Comparison: compared with previous ECG from 8/19/2024  Comparison to previous ECG: Lateral Q waves are now present (due to limb lead reversal on current ECG); otherwise, no changes  Rhythm: atrial fibrillation  Rate: normal  BPM: 62  Q waves: V1, V2, V3, I and aVL    Other findings: low voltage    Clinical impression: abnormal EKG        Results        Results for orders placed during the hospital encounter of 10/18/24    Adult Transesophageal Echo (MAURISIO) W/ Cont if Necessary Per Protocol    Interpretation Summary    Left ventricular systolic function is mildly decreased, with global hypokinesis. Estimated left ventricular EF = 40%    Moderate to severe mitral valve regurgitation is present with a posteriorly-directed jet noted.  This appears to be of mixed etiology; primarily \"functional\" from annular dilatation, but also with a mild component of prolapse of the anterior leaflet.    Severe biatrial enlargement.        Assessment/Plan:     Problem List Items Addressed This Visit          Cardiac and Vasculature    Essential hypertension (Chronic)    Atrial fibrillation (Chronic)    Chronic combined systolic and diastolic CHF (congestive heart failure) - Primary (Chronic)    Nonrheumatic mitral valve regurgitation (Chronic)    Overview     Eccentric (posteriorly directed) and therefore severe            Coag and Thromboembolic    Chronic anticoagulation    Overview     Has been on coumadin for CVA prophylaxis with AF - never switched to DOAC because, as family states, he was told his AF was valvular.  This is actually not the case - as 'valvular af' implies rheumatic mitral stenosis (and not regurgitation, which is what he has)          Other Visit Diagnoses       Preop cardiovascular exam                  Recommendations/plans:    Assessment & Plan  1.  Severe mitral regurgitation: Stable.  Has been severe for numerous years.  Has been " evaluated by Dr. Thacker (CT surgery) who is recommended mitral valve repair/replacement, which certainly may be considered in the future but patient is currently stable I would like to have his current genitourinary issues addressed first, which is certainly reasonable as frequent urinary tract infections have been an ongoing issue for the patient, and certainly would heighten any risk of a surgical valve prosthesis (in terms of causing infection after surgery)  -Continue to monitor volume status and symptoms carefully; is euvolemic today      2. Chronic combined (systolic and diastolic) heart failure: He remains clinically stable with NYHA II status and euvolemia, still never having required hospitalization for CHF. He is on his maximum tolerated version of goal-directed therapy for chronic systolic failure currently as previous medications, which actually had helped his breathing, had to subsequently be stopped due to orthostasis and near syncopal symptoms. These included higher doses of Entresto. SGLT2 inhibitors had to be stopped because of his UTIs; since discontinuing Flomax, the near-syncopal episodes have improved to the point of resolution.   -After upcoming surgery, it would be worthwhile to try uptitrating and resuming goal-directed therapies for systolic failure  -In the meantime, continue low-dose Entresto  -No beta-blocker due to bradycardia  -No SGLT2 inhibitor given frequent previous UTIs  -No spironolactone as this previously caused gynecomastia    3.  Permanent atrial fibrillation: Stable.  Remains rate controlled.  -Continue Eliquis for CVA prophylaxis  -As noted below, this can safely be held for surgery and patient would not generally need Lovenox for therapeutic anticoagulation while off Eliquis.  We did discuss his daily risk of stroke, and being as low as that is, therapeutic anticoagulation is okay to be held prior to surgery    4.  Preoperative risk assessment: He is scheduled to undergo a  TURP procedure and tumor removal by Dr. Farr on 02/20/2025.   By Revised Cardiac Risk index, this patient is low risk for MACE (major adverse cardiac event) with this procedure.  Since he has no active cardiac conditions (acute coronary syndrome, acutely decompensated heart failure, severe valvular disease, uncontrolled arrhythmia), this risk is non-modifiable. Therefore, proceed with surgery as planned with following changes to medical regimen:  -Patient and Dayami tell me that Dr. Farr has advised discontinuing Eliquis until the following Monday after the procedure.  They tell me that has previously been on Lovenox while taking Coumadin and having to come off therapy for other procedures.  He has never had a stroke when off anticoagulation.  -I informed them that my recommendation to hold Eliquis as per Dr. Farr's instructions. If the duration off Eliquis extends and there is concern, bridging with Lovenox can be considered, but that is not generally recommended for patient simply in the setting of atrial fibrillation      5.  Essential hypertension: Well-controlled.  Continue Entresto.      Follow-up  The patient will follow up in 4 weeks to assess post-surgical recovery and blood pressure status, at which point CHF therapies may be adjusted.              Kannan Piña MD  02/06/2025  09:19 CST    Transcribed from ambient dictation for Kannan Piña MD by Kannan Piña MD.  02/06/25   07:25 CST    Patient or patient representative verbalized consent to the visit recording.

## 2025-02-12 ENCOUNTER — LAB (OUTPATIENT)
Dept: LAB | Facility: HOSPITAL | Age: 85
End: 2025-02-12
Payer: MEDICARE

## 2025-02-12 DIAGNOSIS — R39.14 BENIGN PROSTATIC HYPERPLASIA WITH INCOMPLETE BLADDER EMPTYING: ICD-10-CM

## 2025-02-12 DIAGNOSIS — Z01.818 PRE-OPERATIVE CLEARANCE: ICD-10-CM

## 2025-02-12 DIAGNOSIS — Z01.818 PRE-OPERATIVE CLEARANCE: Primary | ICD-10-CM

## 2025-02-12 DIAGNOSIS — N40.1 BENIGN PROSTATIC HYPERPLASIA WITH INCOMPLETE BLADDER EMPTYING: ICD-10-CM

## 2025-02-12 LAB
ALBUMIN SERPL-MCNC: 4.5 G/DL (ref 3.5–5)
ALBUMIN/GLOB SERPL: 1.2 G/DL (ref 1.1–2.5)
ALP SERPL-CCNC: 66 U/L (ref 24–120)
ALT SERPL W P-5'-P-CCNC: 20 U/L (ref 0–50)
ANION GAP SERPL CALCULATED.3IONS-SCNC: 11 MMOL/L (ref 4–13)
AST SERPL-CCNC: 24 U/L (ref 7–45)
AUTO MIXED CELLS #: 1 10*3/MM3 (ref 0.1–2.6)
AUTO MIXED CELLS %: 10.7 % (ref 0.1–24)
BILIRUB SERPL-MCNC: 0.8 MG/DL (ref 0.1–1)
BUN SERPL-MCNC: 20 MG/DL (ref 5–21)
BUN/CREAT SERPL: 16.7
CALCIUM SPEC-SCNC: 9.9 MG/DL (ref 8.6–10.5)
CHLORIDE SERPL-SCNC: 105 MMOL/L (ref 98–110)
CO2 SERPL-SCNC: 23 MMOL/L (ref 24–31)
CREAT SERPL-MCNC: 1.2 MG/DL (ref 0.5–1.4)
EGFRCR SERPLBLD CKD-EPI 2021: 59.3 ML/MIN/1.73
ERYTHROCYTE [DISTWIDTH] IN BLOOD BY AUTOMATED COUNT: 12.9 % (ref 12.3–15.4)
GLOBULIN UR ELPH-MCNC: 3.7 GM/DL
GLUCOSE SERPL-MCNC: 120 MG/DL (ref 65–99)
HCT VFR BLD AUTO: 43.7 % (ref 37.5–51)
HGB BLD-MCNC: 14.1 G/DL (ref 13–17.7)
LYMPHOCYTES # BLD AUTO: 1.4 10*3/MM3 (ref 0.7–3.1)
LYMPHOCYTES NFR BLD AUTO: 15.4 % (ref 19.6–45.3)
MCH RBC QN AUTO: 30 PG (ref 26.6–33)
MCHC RBC AUTO-ENTMCNC: 32.3 G/DL (ref 31.5–35.7)
MCV RBC AUTO: 93 FL (ref 79–97)
NEUTROPHILS NFR BLD AUTO: 6.6 10*3/MM3 (ref 1.7–7)
NEUTROPHILS NFR BLD AUTO: 73.9 % (ref 42.7–76)
PLATELET # BLD AUTO: 153 10*3/MM3 (ref 140–450)
PMV BLD AUTO: 9.1 FL (ref 6–12)
POTASSIUM SERPL-SCNC: 4.6 MMOL/L (ref 3.5–5.3)
PROT SERPL-MCNC: 8.2 G/DL (ref 6.3–8.7)
RBC # BLD AUTO: 4.7 10*6/MM3 (ref 4.14–5.8)
SODIUM SERPL-SCNC: 139 MMOL/L (ref 135–145)
WBC NRBC COR # BLD AUTO: 9 10*3/MM3 (ref 3.4–10.8)

## 2025-02-12 PROCEDURE — 85025 COMPLETE CBC W/AUTO DIFF WBC: CPT | Performed by: NURSE PRACTITIONER

## 2025-02-12 PROCEDURE — 36415 COLL VENOUS BLD VENIPUNCTURE: CPT

## 2025-02-12 PROCEDURE — 80053 COMPREHEN METABOLIC PANEL: CPT

## 2025-02-12 RX ORDER — FINASTERIDE 5 MG/1
5 TABLET, FILM COATED ORAL 2 TIMES DAILY
Qty: 180 TABLET | Refills: 0 | Status: SHIPPED | OUTPATIENT
Start: 2025-02-12

## 2025-02-17 ENCOUNTER — TELEPHONE (OUTPATIENT)
Dept: FAMILY MEDICINE CLINIC | Facility: CLINIC | Age: 85
End: 2025-02-17
Payer: MEDICARE

## 2025-02-17 NOTE — TELEPHONE ENCOUNTER
Caller: [facility] - [place], [place] - [address] - [phone]  - [phone] [place]    Relationship to patient: Pharmacy    Best call back number:   SHAKIRA HERNANDEZ - [address] - [phone] [facility] [phone] [facility] (Pharmacy) [phone]       Patient is needing:           finasteride (PROSCAR) 5 MG tablet  THIS IS AN UNUSUAL HIGH DOSE. WAS THIS DONE IN ERROR OR IS THIS WHAT THE PATIENT SHOULD GET?

## 2025-03-17 DIAGNOSIS — J47.9 BRONCHIECTASIS WITHOUT COMPLICATION: ICD-10-CM

## 2025-03-17 RX ORDER — ALBUTEROL SULFATE 0.83 MG/ML
SOLUTION RESPIRATORY (INHALATION)
Qty: 180 ML | Refills: 0 | Status: SHIPPED | OUTPATIENT
Start: 2025-03-17

## 2025-03-17 NOTE — TELEPHONE ENCOUNTER
Rx Refill Note  Requested Prescriptions     Pending Prescriptions Disp Refills    albuterol (PROVENTIL) (2.5 MG/3ML) 0.083% nebulizer solution [Pharmacy Med Name: Albuterol Sulfate (2.5 MG/3ML) 0.083% Inhalation Nebulization Solution] 180 mL 0     Sig: USE 1 VIAL IN NEBULIZER EVERY 4 HOURS AS NEEDED FOR WHEEZING FOR SHORTNESS OF BREATH      Last office visit with prescribing clinician: 11/7/2024   Last telemedicine visit with prescribing clinician: Visit date not found   Next office visit with prescribing clinician: Visit date not found                         Would you like a call back once the refill request has been completed: [] Yes [] No    If the office needs to give you a call back, can they leave a voicemail: [] Yes [] No    Sylvia Drew MA  03/17/25, 15:43 CDT

## 2025-03-26 NOTE — PROGRESS NOTES
Subjective    Mr. Foote is 84 y.o. 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  This was a recurrence of a previous tumor and he is he has received BCG in the past approximately 20 years ago.Denies dysuria, hematuria.  Underwent TURP left stent placement which ureteral stent has been removed.    The following portions of the patient's history were reviewed and updated as appropriate: allergies, current medications, past family history, past medical history, past social history, past surgical history and problem list.    Review of Systems      Current Outpatient Medications:     apixaban (ELIQUIS) 5 MG tablet tablet, Take 1 tablet by mouth 2 (Two) Times a Day. *CAN START TAKING THIS TOMORROW NIGHT (11/23 PM), AND TAKE IT TWICE A DAY EVERY DAY THEREAFTER, Disp: 60 tablet, Rfl: 11    Apoaequorin (PREVAGEN PO), Take  by mouth., Disp: , Rfl:     aspirin 81 MG chewable tablet, Chew 1 tablet Daily., Disp: , Rfl:     esomeprazole (nexIUM) 40 MG capsule, Take 1 capsule by mouth Every Morning Before Breakfast., Disp: , Rfl:     FLUoxetine (PROzac) 20 MG capsule, Take 1 capsule by mouth Daily., Disp: , Rfl:     Fluticasone-Umeclidin-Vilant (Trelegy Ellipta) 200-62.5-25 MCG/ACT aerosol powder , 1 puff Daily., Disp: , Rfl:     gabapentin (NEURONTIN) 300 MG capsule, Take 2 capsules by mouth Daily. At bedtime, Disp: , Rfl:     guaiFENesin (MUCINEX) 600 MG 12 hr tablet, Take 2 tablets by mouth 2 (Two) Times a Day., Disp: , Rfl:     ipratropium-albuterol (DUO-NEB) 0.5-2.5 mg/3 ml nebulizer, USE 1 AMPULE IN NEBULIZER EVERY 6 HOURS AS NEEDED, Disp: , Rfl:     Misc Natural Products (OSTEO BI-FLEX ADV JOINT SHIELD PO), Take 1 tablet by mouth Daily., Disp: , Rfl:     Multiple Vitamins-Minerals (CENTRUM SILVER) tablet, 1 tablet Daily., Disp: , Rfl:      O2 (OXYGEN), Inhale 2 L/min Every Night., Disp: , Rfl:     sacubitril-valsartan (Entresto) 24-26 MG tablet, Take 1 tablet by mouth 2 (Two) Times a Day., Disp: , Rfl:     vitamin B-12 (CYANOCOBALAMIN) 1000 MCG tablet, Take 1 tablet by mouth Daily., Disp: , Rfl:     vitamin C (ASCORBIC ACID) 500 MG tablet, Take 1 tablet by mouth Daily. (Patient taking differently: Take 2 tablets by mouth Daily.), Disp: , Rfl:     albuterol (PROVENTIL) (2.5 MG/3ML) 0.083% nebulizer solution, USE 1 VIAL IN NEBULIZER EVERY 4 HOURS AS NEEDED FOR WHEEZING FOR SHORTNESS OF BREATH (Patient not taking: Reported on 4/2/2025), Disp: 180 mL, Rfl: 0    azelastine (ASTELIN) 0.1 % nasal spray, Administer 2 sprays into the nostril(s) as directed by provider 2 (Two) Times a Day. Use in each nostril as directed (Patient not taking: Reported on 4/2/2025), Disp: , Rfl:     fluticasone (FLONASE) 50 MCG/ACT nasal spray, Administer 2 sprays into the nostril(s) as directed by provider Daily. 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    Peptic ulcer     Peripheral neuropathy     Pneumonia     Rheumatic fever     Sleep apnea     cpap at hs       Past Surgical History:   Procedure Laterality Date    APPENDECTOMY      BLADDER SURGERY      CARDIAC CATHETERIZATION N/A 10/11/2023    Procedure: Coronary angiography;  Surgeon: Kannan Piña MD;  Location:  [facility];  Service: Cardiology;  Laterality: N/A;    CARDIAC  "CATHETERIZATION N/A 10/11/2023    Procedure: Percutaneous Coronary Intervention;  Surgeon: Kannan Piña MD;  Location:  [facility];  Service: Cardiology;  Laterality: N/A;    CARDIAC CATHETERIZATION N/A 10/11/2023    Procedure: Right Heart Cath;  Surgeon: Kannan Piña MD;  Location:  [facility];  Service: Cardiology;  Laterality: N/A;    COLONOSCOPY  2016    hyperplastic polyp, diverticulosis    COLONOSCOPY N/A 2021    Procedure: COLONOSCOPY WITH ANESTHESIA;  Surgeon: Frederick Adamson MD;  Location:  [facility] END[facility];  Service: Gastroenterology;  Laterality: N/A;  preop; hx of polyps  postop; diverticulosis  PCP Stefano Sun     TONSILLECTOMY      TOTAL KNEE ARTHROPLASTY Left     VASECTOMY         Social History     Socioeconomic History    Marital status:    Tobacco Use    Smoking status: Former     Current packs/day: 0.00     Average packs/day: 1 pack/day for 15.0 years (15.0 ttl pk-yrs)     Types: Cigarettes     Start date:      Quit date:      Years since quittin.2     Passive exposure: Past    Smokeless tobacco: Never   Vaping Use    Vaping status: Never Used   Substance and Sexual Activity    Alcohol use: Yes     Comment: occ    Drug use: Not Currently    Sexual activity: Defer       Family History   Problem Relation Age of Onset    Heart disease Mother     No Known Problems Father     Colon cancer Neg Hx        Objective    Temp 97.1 °F (36.2 °C)   Ht 175.3 cm (69\")   Wt 88.1 kg (194 lb 3.2 oz)   BMI 28.68 kg/m²     Physical Exam    Microscopic Urinalysis  I inspected the urine myself based on the clinical situation including the dipstick urine. The urine is spun in a centrifuge for three minutes. The spun urine shows  rbc/hpf, 20-50 wbc/hpf, none epi/hpf, 2+ bacteria, negative crystals, and negative casts.       Results for orders placed or performed in visit on 25   POC Urinalysis Dipstick, Multipro    Collection " Time: 04/02/25 12:19 PM    Specimen: Urine   Result Value Ref Range    Color Dark Yellow Yellow, Straw, Dark Yellow, [place]    Clarity, UA Slightly Cloudy (A) Clear    Glucose, UA Negative Negative mg/dL    Bilirubin Negative Negative    Ketones, UA Negative Negative    Specific Gravity  1.020 1.005 - 1.030    Blood, UA Large (A) Negative    pH, Urine 5.5 5.0 - 8.0    Protein,  mg/dL (A) Negative mg/dL    Urobilinogen, UA 0.2 E.U./dL Normal, 0.2 E.U./dL    Nitrite, UA Positive (A) Negative    Leukocytes Large (3+) (A) Negative   IPSS Questionnaire (AUA-7):  Incomplete emptying  Over the past month, how often have you had a sensation of not emptying your bladder completely after you finished urinating?: Not at all (04/02/25 1218)  Frequency  Over the past month, how often have you had to urinate again less than two hours after you finishied urinating ?: Less than 1 time in 5 (04/02/25 1218)  Intermittency  Over the past month, how often have you found you stopped and started again several time when you urinated ?: Not at all (04/02/25 1218)  Urgency  Over the last month, how often have you found it difficult  have you found it difficult to postpone urination ?: More than half the time (04/02/25 1218)  Weak Stream  Over the past month, how often have you had a weak urinary stream ?: Not at all (04/02/25 1218)  Straining  Over the past month, how often have you had to push or strain to begin urination ?: Not at all (04/02/25 1218)  Nocturia  Over the past month, how many times did you most typically get up to urinate from the time you went to bed until the time you got up in the morning ?: 1 time (04/02/25 1218)  Quality of life due to urinary symptoms  If you were to spend the rest of your life with your urinary condition the way it is now, how would feel about that?: Delighted (04/02/25 1218)    Scores  Total IPSS Score: 6 (04/02/25 1218)  Total Score = Symptomatic Level: Mildly symptomatic: 0-7 (04/02/25 1218)         Assessment and Plan    Diagnoses and all orders for this visit:    1. Malignant neoplasm of anterior wall of bladder (Primary)  -     POC Urinalysis Dipstick, Multipro  -     Urine Culture - Urine, Urine, Random Void    2. Bacteria in urine  -     Urine Culture - Urine, Urine, Random Void      Discussed BCG shortage with patient.  Discussed that I would recommend induction BCG and probably would forego maintenance based on the BCG shortage if we are able to give him maintenance he will get a third to half dose reduction.  Discussed surveillance cystoscopy as well.    ARAVIND finasteride.    Follow-up in 3 months with cystoscopy.    Will contact patient with urine culture results and appropriate antibiotics.

## 2025-04-02 ENCOUNTER — OFFICE VISIT (OUTPATIENT)
Dept: UROLOGY | Facility: CLINIC | Age: 85
End: 2025-04-02
Payer: MEDICARE

## 2025-04-02 VITALS — HEIGHT: 69 IN | WEIGHT: 194.2 LBS | TEMPERATURE: 97.1 F | BODY MASS INDEX: 28.76 KG/M2

## 2025-04-02 DIAGNOSIS — C67.3 MALIGNANT NEOPLASM OF ANTERIOR WALL OF BLADDER: Primary | ICD-10-CM

## 2025-04-02 DIAGNOSIS — R82.71 BACTERIA IN URINE: ICD-10-CM

## 2025-04-02 LAB
BILIRUB BLD-MCNC: NEGATIVE MG/DL
CLARITY, POC: ABNORMAL
COLOR UR: ABNORMAL
GLUCOSE UR STRIP-MCNC: NEGATIVE MG/DL
KETONES UR QL: NEGATIVE
LEUKOCYTE EST, POC: ABNORMAL
NITRITE UR-MCNC: POSITIVE MG/ML
PH UR: 5.5 [PH] (ref 5–8)
PROT UR STRIP-MCNC: ABNORMAL MG/DL
RBC # UR STRIP: ABNORMAL /UL
SP GR UR: 1.02 (ref 1–1.03)
UROBILINOGEN UR QL: ABNORMAL

## 2025-04-02 PROCEDURE — 87186 SC STD MICRODIL/AGAR DIL: CPT | Performed by: UROLOGY

## 2025-04-02 PROCEDURE — 87086 URINE CULTURE/COLONY COUNT: CPT | Performed by: UROLOGY

## 2025-04-02 PROCEDURE — 87077 CULTURE AEROBIC IDENTIFY: CPT | Performed by: UROLOGY

## 2025-04-02 RX ORDER — IPRATROPIUM BROMIDE AND ALBUTEROL SULFATE 2.5; .5 MG/3ML; MG/3ML
SOLUTION RESPIRATORY (INHALATION)
COMMUNITY

## 2025-04-04 ENCOUNTER — RESULTS FOLLOW-UP (OUTPATIENT)
Dept: UROLOGY | Facility: CLINIC | Age: 85
End: 2025-04-04
Payer: MEDICARE

## 2025-04-04 DIAGNOSIS — N30.00 ACUTE CYSTITIS WITHOUT HEMATURIA: Primary | ICD-10-CM

## 2025-04-04 LAB — BACTERIA SPEC AEROBE CULT: ABNORMAL

## 2025-04-04 RX ORDER — CEFDINIR 300 MG/1
300 CAPSULE ORAL 2 TIMES DAILY
Qty: 20 CAPSULE | Refills: 0 | Status: SHIPPED | OUTPATIENT
Start: 2025-04-04

## 2025-04-07 PROBLEM — C67.3 MALIGNANT NEOPLASM OF ANTERIOR WALL OF URINARY BLADDER: Status: ACTIVE | Noted: 2025-04-07

## 2025-04-09 ENCOUNTER — TELEPHONE (OUTPATIENT)
Dept: UROLOGY | Facility: CLINIC | Age: 85
End: 2025-04-09
Payer: MEDICARE

## 2025-04-09 NOTE — TELEPHONE ENCOUNTER
Pt. Daughter called and stated pt. Had misplaced his antibiotics. She believes he has had 4-5 doses but is unable to find the bottle now and missed his dose last night. Wanted to know if we would send in a replacement script. Cefdinir 300 mg 1 tablet BID #16 no refills called to [facility] in [place]. Advised pt. Daughter if they did find the other prescription to only complete one bottle and discard the rest. She v/u.

## 2025-04-22 ENCOUNTER — INFUSION (OUTPATIENT)
Dept: ONCOLOGY | Facility: HOSPITAL | Age: 85
End: 2025-04-22
Payer: MEDICARE

## 2025-04-22 ENCOUNTER — LAB (OUTPATIENT)
Dept: LAB | Facility: HOSPITAL | Age: 85
End: 2025-04-22
Payer: MEDICARE

## 2025-04-22 VITALS
OXYGEN SATURATION: 98 % | HEART RATE: 51 BPM | WEIGHT: 178 LBS | SYSTOLIC BLOOD PRESSURE: 113 MMHG | DIASTOLIC BLOOD PRESSURE: 61 MMHG | RESPIRATION RATE: 18 BRPM | BODY MASS INDEX: 26.36 KG/M2 | HEIGHT: 69 IN | TEMPERATURE: 97.7 F

## 2025-04-22 DIAGNOSIS — C67.3 MALIGNANT NEOPLASM OF ANTERIOR WALL OF BLADDER: ICD-10-CM

## 2025-04-22 DIAGNOSIS — C67.3 MALIGNANT NEOPLASM OF ANTERIOR WALL OF URINARY BLADDER: Primary | ICD-10-CM

## 2025-04-22 DIAGNOSIS — R82.90 URINE ABNORMALITY: ICD-10-CM

## 2025-04-22 DIAGNOSIS — C67.3 MALIGNANT NEOPLASM OF ANTERIOR WALL OF BLADDER: Primary | ICD-10-CM

## 2025-04-22 LAB
BACTERIA UR QL AUTO: ABNORMAL /HPF
BILIRUB UR QL STRIP: NEGATIVE
CLARITY UR: CLEAR
COLOR UR: YELLOW
GLUCOSE UR STRIP-MCNC: NEGATIVE MG/DL
HGB UR QL STRIP.AUTO: ABNORMAL
HYALINE CASTS UR QL AUTO: ABNORMAL /LPF
KETONES UR QL STRIP: ABNORMAL
LEUKOCYTE ESTERASE UR QL STRIP.AUTO: ABNORMAL
NITRITE UR QL STRIP: POSITIVE
PH UR STRIP.AUTO: 5.5 [PH] (ref 5–8)
PROT UR QL STRIP: ABNORMAL
RBC # UR STRIP: ABNORMAL /HPF
REF LAB TEST METHOD: ABNORMAL
SP GR UR STRIP: 1.03 (ref 1–1.03)
SQUAMOUS #/AREA URNS HPF: ABNORMAL /HPF
UROBILINOGEN UR QL STRIP: ABNORMAL
WBC # UR STRIP: ABNORMAL /HPF

## 2025-04-22 PROCEDURE — 87086 URINE CULTURE/COLONY COUNT: CPT

## 2025-04-22 PROCEDURE — 81001 URINALYSIS AUTO W/SCOPE: CPT

## 2025-04-22 PROCEDURE — G0463 HOSPITAL OUTPT CLINIC VISIT: HCPCS

## 2025-04-22 PROCEDURE — 87077 CULTURE AEROBIC IDENTIFY: CPT

## 2025-04-22 PROCEDURE — 87186 SC STD MICRODIL/AGAR DIL: CPT

## 2025-04-22 NOTE — PROGRESS NOTES
Urinalysis labs today reported via secure chat to Sun DEJESUS and was sent back instructions to push out treatment 10 days and put in order for urine culture. Patient was informed and rescheduled. Patient did report while leaving that he had just recently finished an antibiotic. I informed about cultures and that it may take a different antibiotic depending on  culture results. Patient is having no symptoms at this time. Patient reports understanding.  Danuta Ovalle RN

## 2025-04-24 ENCOUNTER — RESULTS FOLLOW-UP (OUTPATIENT)
Dept: ONCOLOGY | Facility: HOSPITAL | Age: 85
End: 2025-04-24
Payer: MEDICARE

## 2025-04-24 DIAGNOSIS — N30.00 ACUTE CYSTITIS WITHOUT HEMATURIA: Primary | ICD-10-CM

## 2025-04-24 LAB — BACTERIA SPEC AEROBE CULT: ABNORMAL

## 2025-04-24 RX ORDER — CEPHALEXIN 500 MG/1
500 CAPSULE ORAL 3 TIMES DAILY
Qty: 42 CAPSULE | Refills: 0 | Status: SHIPPED | OUTPATIENT
Start: 2025-04-24 | End: 2025-05-08

## 2025-04-24 NOTE — TELEPHONE ENCOUNTER
Called and spoke with daughter. She v/u    ----- Message from Germain Medina sent at 4/24/2025  1:24 PM CDT -----  Please inform patient of positive urine culture.  I sent in 2 weeks worth of antibiotics.  Needs to hold his BCG until his antibiotic treatment is completed.  ----- Message -----  From: Lab, Background User  Sent: 4/23/2025  10:19 AM CDT  To: Germain Medina MD

## 2025-05-06 ENCOUNTER — INFUSION (OUTPATIENT)
Dept: ONCOLOGY | Facility: HOSPITAL | Age: 85
End: 2025-05-06
Payer: MEDICARE

## 2025-05-06 ENCOUNTER — LAB (OUTPATIENT)
Dept: LAB | Facility: HOSPITAL | Age: 85
End: 2025-05-06
Payer: MEDICARE

## 2025-05-06 VITALS
HEIGHT: 69 IN | SYSTOLIC BLOOD PRESSURE: 134 MMHG | RESPIRATION RATE: 16 BRPM | WEIGHT: 177 LBS | OXYGEN SATURATION: 96 % | DIASTOLIC BLOOD PRESSURE: 50 MMHG | TEMPERATURE: 97.4 F | HEART RATE: 62 BPM | BODY MASS INDEX: 26.22 KG/M2

## 2025-05-06 DIAGNOSIS — C67.3 MALIGNANT NEOPLASM OF ANTERIOR WALL OF URINARY BLADDER: Primary | ICD-10-CM

## 2025-05-06 DIAGNOSIS — C67.3 MALIGNANT NEOPLASM OF ANTERIOR WALL OF BLADDER: ICD-10-CM

## 2025-05-06 LAB
BACTERIA UR QL AUTO: ABNORMAL /HPF
BILIRUB UR QL STRIP: NEGATIVE
CLARITY UR: CLEAR
COLOR UR: YELLOW
GLUCOSE UR STRIP-MCNC: NEGATIVE MG/DL
HGB UR QL STRIP.AUTO: ABNORMAL
HYALINE CASTS UR QL AUTO: ABNORMAL /LPF
KETONES UR QL STRIP: NEGATIVE
LEUKOCYTE ESTERASE UR QL STRIP.AUTO: ABNORMAL
NITRITE UR QL STRIP: NEGATIVE
PH UR STRIP.AUTO: 5.5 [PH] (ref 5–8)
PROT UR QL STRIP: ABNORMAL
RBC # UR STRIP: ABNORMAL /HPF
REF LAB TEST METHOD: ABNORMAL
SP GR UR STRIP: 1.02 (ref 1–1.03)
SQUAMOUS #/AREA URNS HPF: ABNORMAL /HPF
UROBILINOGEN UR QL STRIP: ABNORMAL
WBC # UR STRIP: ABNORMAL /HPF

## 2025-05-06 PROCEDURE — 25010000002 BCG PER INSTILLATION: Performed by: UROLOGY

## 2025-05-06 PROCEDURE — 81001 URINALYSIS AUTO W/SCOPE: CPT

## 2025-05-06 RX ADMIN — SODIUM CHLORIDE 50 MG: 9 INJECTION, SOLUTION INTRAMUSCULAR; INTRAVENOUS; SUBCUTANEOUS at 10:21

## 2025-05-06 NOTE — PROGRESS NOTES
OP ONCOLOGY - BCG DOSE TRACKING - PHARMACY NOTE:     Riley Foote  1940 - 84 y.o.  [address] [address]  MRN: [id number]       To inquire about specific dates and times of doses given, please contact pharmacist at [phone], or [phone] (inside line) between the hours of 7 AM and 5 PM Monday through Friday. Outside of these hours, please call [phone].       **FULL DOSE** means full vial of Philippe BCG (50 mg). Partial doses will be expressed as percentages of the full dose (ex. 50% = one-half vial).           BCG INDUCTION  Day 1: Patient received FULL DOSE (50 mg) on 05/06/25        Electronically signed 05/06/2025 09:49 CDT by:  Domonique Hartman, PharmD  Hematology & Oncology Specialty Pharmacist  [facility]  Phone: [phone]

## 2025-05-06 NOTE — PROGRESS NOTES
Per RAGHAV Baltazar after reviewing urine results pt ok to proceed with BCG today and continue abx for 2 more days until completed. Pt and family informed and agreeable to plan.

## 2025-05-07 ENCOUNTER — PROCEDURE VISIT (OUTPATIENT)
Dept: PULMONOLOGY | Facility: CLINIC | Age: 85
End: 2025-05-07
Payer: MEDICARE

## 2025-05-07 ENCOUNTER — OFFICE VISIT (OUTPATIENT)
Dept: PULMONOLOGY | Facility: CLINIC | Age: 85
End: 2025-05-07
Payer: MEDICARE

## 2025-05-07 VITALS
DIASTOLIC BLOOD PRESSURE: 70 MMHG | BODY MASS INDEX: 25.92 KG/M2 | WEIGHT: 175 LBS | SYSTOLIC BLOOD PRESSURE: 122 MMHG | HEART RATE: 66 BPM | OXYGEN SATURATION: 96 % | HEIGHT: 69 IN

## 2025-05-07 DIAGNOSIS — J43.8 OTHER EMPHYSEMA: Chronic | ICD-10-CM

## 2025-05-07 DIAGNOSIS — J47.9 BRONCHIECTASIS WITHOUT COMPLICATION: Primary | ICD-10-CM

## 2025-05-07 DIAGNOSIS — J47.9 BRONCHIECTASIS WITHOUT COMPLICATION: Primary | Chronic | ICD-10-CM

## 2025-05-07 DIAGNOSIS — G47.33 OBSTRUCTIVE SLEEP APNEA: Chronic | ICD-10-CM

## 2025-05-07 RX ORDER — FLUTICASONE FUROATE, UMECLIDINIUM BROMIDE AND VILANTEROL TRIFENATATE 200; 62.5; 25 UG/1; UG/1; UG/1
1 POWDER RESPIRATORY (INHALATION) EVERY 24 HOURS
Qty: 3 EACH | Refills: 3 | Status: SHIPPED | OUTPATIENT
Start: 2025-05-07

## 2025-05-07 NOTE — PROCEDURES
Spirometry with Diffusion Capacity    Performed by: Deepa Beck, RRT  Authorized by: Ismael Gamino APRN     Pre Drug % Predicted    FVC: 110%   FEV1: 133%   FEF 25-75%: 234%   FEV1/FVC: 88%   DLCO: 101%   D/VAsb: 92%    Interpretation   Spirometry   Spirometry shows normal results.

## 2025-05-07 NOTE — PROGRESS NOTES
"Chief Complaint  Bronchiectasis without complication    Subjective    History of Present Illness      Riley Foote presents to [facility] for:    History of Present Illness   Management of bronchiectasis, emphysema and nocturnal hypoxia with SIMÓN.  He reports good compliance on his PAP device with oxygen.  He has not had any recent bronchiectasis exacerbations.  This patient has historically been seen by OLEG Alcala and I am now assuming his pulmonary care.  He has reoccurring bladder cancer and is currently under BCG treatments for the next 6 weeks.  At some point he is going to need a valve replacement with Dr. Thacker.  He is on daily Trelegy, albuterol neb treatments 1-2 times a day, Mucinex and daily chest percussion treatment therapy.  He feels that his pulmonary status is very stable.  He had stable updated spirometry today.  He stays up-to-date on vaccines.  Objective   Vital Signs:   /70   Pulse 66   Ht 175.3 cm (69\")   Wt 79.4 kg (175 lb)   SpO2 96% Comment: RA  BMI 25.84 kg/m²     Physical Exam  Vitals reviewed.   Constitutional:       General: He is not in acute distress.     Appearance: He is well-developed and overweight.   HENT:      Head: Normocephalic and atraumatic.   Eyes:      General: No scleral icterus.  Cardiovascular:      Rate and Rhythm: Normal rate.   Pulmonary:      Effort: Pulmonary effort is normal. No respiratory distress.      Breath sounds: Normal breath sounds. No wheezing or rales.   Musculoskeletal:         General: Normal range of motion.      Cervical back: Normal range of motion and neck supple.   Skin:     General: Skin is warm and dry.   Neurological:      Mental Status: He is alert and oriented to person, place, and time.   Psychiatric:         Mood and Affect: Mood normal.         Behavior: Behavior normal.        Result Review :       Results for orders placed in visit on 05/07/25    Spirometry with " Diffusion Capacity    Narrative  Spirometry with Diffusion Capacity    Performed by: Deepa Beck, RRT  Authorized by: Ismael Gamino APRN  Pre Drug % Predicted  FVC: 110%  FEV1: 133%  FEF 25-75%: 234%  FEV1/FVC: 88%  DLCO: 101%  D/VAsb: 92%    Interpretation  Spirometry  Spirometry shows normal results.      Results for orders placed in visit on 05/31/24    Spirometry with Diffusion Capacity    Narrative  Spirometry with Diffusion Capacity    Performed by: Deepa Beck, RRT  Authorized by: Ilene Oliveros APRN  Pre Drug % Predicted  FVC: 104%  FEV1: 129%  FEF 25-75%: 243%  FEV1/FVC: 91%  DLCO: 99%  D/VAsb: 88%    Interpretation  Spirometry  Spirometry shows normal results. midflow is normal.  Review of FVL curve  Patient's effort is normal.  Diffusion Capacity  The patient's diffusion capacity is normal.  Diffusion capacity is normal when corrected for alveolar volume.      Results for orders placed during the hospital encounter of 04/11/23    Pulmonary Function Test    [facility] - Pulmonary Function Test    [facility]  [address]  [phone]    Patient : Riley Foote  MRN : [id number]  CSN : [id number]  Pulmonologist : Antoine Mayes MD  Date : 4/11/2023    ______________________________________________________________________    Interpretation :  1.  Spirometry is within normal limits and in fact midflows are supranormal.  2.  Lung volumes reveal a decrease in expiratory reserve volume with a coexisting increase in inspiratory capacity and otherwise are within normal limits.  3.  There is a mild diffusion impairment even when corrected for alveolar volume.      Antoine Mayes MD               Assessment and Plan      Diagnoses and all orders for this visit:    1. Bronchiectasis without complication (Primary)  Comments:  Stable.  No recent exacerbations.  Continue chest percussion therapy, Mucinex and pulmonary hygiene with  albuterol nebs.  Orders:  -     Spirometry with Diffusion Capacity    2. Other emphysema  Comments:  Stable.  No recent exacerbations.  Continue Trelegy.  Flow-volume loop on return.  Orders:  -     Fluticasone-Umeclidin-Vilant (Trelegy Ellipta) 200-62.5-25 MCG/ACT inhaler; Inhale 1 puff Daily.  Dispense: 3 each; Refill: 3    3. Obstructive sleep apnea on PAP and nocturnal oxygen  Comments:  Stable.  Continue PAP device and nocturnal oxygen.        Ismael Gamino, APRN  5/7/2025  12:00 CDT    Follow Up   Return in about 1 year (around 5/7/2026) for FVL.    Patient was given instructions and counseling regarding his condition or for health maintenance advice. Please see specific information pulled into the AVS if appropriate.

## 2025-05-13 ENCOUNTER — LAB (OUTPATIENT)
Dept: LAB | Facility: HOSPITAL | Age: 85
End: 2025-05-13
Payer: MEDICARE

## 2025-05-13 ENCOUNTER — INFUSION (OUTPATIENT)
Dept: ONCOLOGY | Facility: HOSPITAL | Age: 85
End: 2025-05-13
Payer: MEDICARE

## 2025-05-13 VITALS
BODY MASS INDEX: 26.22 KG/M2 | DIASTOLIC BLOOD PRESSURE: 70 MMHG | TEMPERATURE: 97.7 F | RESPIRATION RATE: 18 BRPM | OXYGEN SATURATION: 96 % | WEIGHT: 177 LBS | SYSTOLIC BLOOD PRESSURE: 127 MMHG | HEIGHT: 69 IN | HEART RATE: 57 BPM

## 2025-05-13 DIAGNOSIS — C67.3 MALIGNANT NEOPLASM OF ANTERIOR WALL OF URINARY BLADDER: Primary | ICD-10-CM

## 2025-05-13 DIAGNOSIS — C67.3 MALIGNANT NEOPLASM OF ANTERIOR WALL OF BLADDER: ICD-10-CM

## 2025-05-13 LAB
BACTERIA UR QL AUTO: ABNORMAL /HPF
BILIRUB UR QL STRIP: NEGATIVE
CLARITY UR: CLEAR
COLOR UR: YELLOW
GLUCOSE UR STRIP-MCNC: NEGATIVE MG/DL
HGB UR QL STRIP.AUTO: ABNORMAL
HYALINE CASTS UR QL AUTO: ABNORMAL /LPF
KETONES UR QL STRIP: NEGATIVE
LEUKOCYTE ESTERASE UR QL STRIP.AUTO: ABNORMAL
NITRITE UR QL STRIP: POSITIVE
PH UR STRIP.AUTO: 5.5 [PH] (ref 5–8)
PROT UR QL STRIP: ABNORMAL
RBC # UR STRIP: ABNORMAL /HPF
REF LAB TEST METHOD: ABNORMAL
SP GR UR STRIP: 1.02 (ref 1–1.03)
SQUAMOUS #/AREA URNS HPF: ABNORMAL /HPF
UROBILINOGEN UR QL STRIP: ABNORMAL
WBC # UR STRIP: ABNORMAL /HPF

## 2025-05-13 PROCEDURE — 87086 URINE CULTURE/COLONY COUNT: CPT

## 2025-05-13 PROCEDURE — G0463 HOSPITAL OUTPT CLINIC VISIT: HCPCS

## 2025-05-13 PROCEDURE — 87077 CULTURE AEROBIC IDENTIFY: CPT

## 2025-05-13 PROCEDURE — 87186 SC STD MICRODIL/AGAR DIL: CPT

## 2025-05-13 PROCEDURE — 81001 URINALYSIS AUTO W/SCOPE: CPT

## 2025-05-13 NOTE — PROGRESS NOTES
Msg to urology: I have Mr. Foote (pt of Dr. Medina) at the cancer center for BCG today. Looks like 3 wks ago (4/22) he was held due to 3+ bacteria and started on abx after culture came back positive. Last week he was able to receive his first BCG. Today he has 4+ bacteria and a few other abnormals on UA, can you please review and let me know if we need to proceed or reschedule him for later date.    From Sun Hook APRN: I recommend holding it at least this 1 more time and sending it for culture.  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     We will have the patient return on 5/27.    Patient and family updated and agreeable to plan. AVS provided, pt discharged in stable condition.

## 2025-05-15 ENCOUNTER — RESULTS FOLLOW-UP (OUTPATIENT)
Dept: ONCOLOGY | Facility: HOSPITAL | Age: 85
End: 2025-05-15
Payer: MEDICARE

## 2025-05-15 DIAGNOSIS — N30.00 ACUTE CYSTITIS WITHOUT HEMATURIA: Primary | ICD-10-CM

## 2025-05-15 LAB — BACTERIA SPEC AEROBE CULT: ABNORMAL

## 2025-05-15 NOTE — TELEPHONE ENCOUNTER
Placed a call to patient and informed of lab result. Spoke with daughter. All questions answered. Daughter v/u.

## 2025-06-03 ENCOUNTER — LAB (OUTPATIENT)
Dept: LAB | Facility: HOSPITAL | Age: 85
End: 2025-06-03
Payer: MEDICARE

## 2025-06-03 ENCOUNTER — INFUSION (OUTPATIENT)
Dept: ONCOLOGY | Facility: HOSPITAL | Age: 85
End: 2025-06-03
Payer: MEDICARE

## 2025-06-03 VITALS
TEMPERATURE: 97.2 F | SYSTOLIC BLOOD PRESSURE: 131 MMHG | RESPIRATION RATE: 20 BRPM | WEIGHT: 177 LBS | DIASTOLIC BLOOD PRESSURE: 75 MMHG | OXYGEN SATURATION: 96 % | HEIGHT: 69 IN | BODY MASS INDEX: 26.22 KG/M2 | HEART RATE: 58 BPM

## 2025-06-03 DIAGNOSIS — C67.3 MALIGNANT NEOPLASM OF ANTERIOR WALL OF URINARY BLADDER: Primary | ICD-10-CM

## 2025-06-03 DIAGNOSIS — C67.3 MALIGNANT NEOPLASM OF ANTERIOR WALL OF BLADDER: ICD-10-CM

## 2025-06-03 LAB
BACTERIA UR QL AUTO: ABNORMAL /HPF
BILIRUB UR QL STRIP: NEGATIVE
CLARITY UR: ABNORMAL
COLOR UR: YELLOW
GLUCOSE UR STRIP-MCNC: NEGATIVE MG/DL
HGB UR QL STRIP.AUTO: ABNORMAL
HYALINE CASTS UR QL AUTO: ABNORMAL /LPF
KETONES UR QL STRIP: NEGATIVE
LEUKOCYTE ESTERASE UR QL STRIP.AUTO: ABNORMAL
NITRITE UR QL STRIP: NEGATIVE
PH UR STRIP.AUTO: 5.5 [PH] (ref 5–8)
PROT UR QL STRIP: ABNORMAL
RBC # UR STRIP: ABNORMAL /HPF
REF LAB TEST METHOD: ABNORMAL
SP GR UR STRIP: 1.01 (ref 1–1.03)
SQUAMOUS #/AREA URNS HPF: ABNORMAL /HPF
UROBILINOGEN UR QL STRIP: ABNORMAL
WBC # UR STRIP: ABNORMAL /HPF

## 2025-06-03 PROCEDURE — 81001 URINALYSIS AUTO W/SCOPE: CPT

## 2025-06-03 PROCEDURE — 25010000002 BCG PER INSTILLATION: Performed by: UROLOGY

## 2025-06-03 RX ADMIN — SODIUM CHLORIDE 50 MG: 9 INJECTION, SOLUTION INTRAMUSCULAR; INTRAVENOUS; SUBCUTANEOUS at 10:41

## 2025-06-03 NOTE — PROGRESS NOTES
OP ONCOLOGY - BCG DOSE TRACKING - PHARMACY NOTE:     Riley Foote  1940 - 84 y.o.  [address] [address]  MRN: [id number]       To inquire about specific dates and times of doses given, please contact pharmacist at [phone], or [phone] (inside line) between the hours of 7 AM and 5 PM Monday through Friday. Outside of these hours, please call [phone].       **FULL DOSE** means full vial of Philippe BCG (50 mg). Partial doses will be expressed as percentages of the full dose (ex. 50% = one-half vial).           BCG INDUCTION  Day 1: Patient received FULL DOSE (50 mg) on 05/06/25  Day 8: Patient received FULL DOSE (50 mg) on 06/03/25      Cirilo Awad, PharmD  06/03/25  13:59 CDT

## 2025-06-03 NOTE — PROGRESS NOTES
Contacted Sun DEJESUS with MD office r/t 4+ bacteria in urine, per Sun ugarte for treatment as long as no s/s UTI, pt appears to be colonized. Pt verbalized no c/o anything, will proceed with treatment. VEGA Grimaldo RN

## 2025-06-10 ENCOUNTER — INFUSION (OUTPATIENT)
Dept: ONCOLOGY | Facility: HOSPITAL | Age: 85
End: 2025-06-10
Payer: MEDICARE

## 2025-06-10 ENCOUNTER — LAB (OUTPATIENT)
Dept: LAB | Facility: HOSPITAL | Age: 85
End: 2025-06-10
Payer: MEDICARE

## 2025-06-10 VITALS
TEMPERATURE: 97.4 F | SYSTOLIC BLOOD PRESSURE: 104 MMHG | WEIGHT: 176.6 LBS | HEART RATE: 50 BPM | BODY MASS INDEX: 26.16 KG/M2 | HEIGHT: 69 IN | OXYGEN SATURATION: 98 % | DIASTOLIC BLOOD PRESSURE: 66 MMHG | RESPIRATION RATE: 20 BRPM

## 2025-06-10 DIAGNOSIS — C67.3 MALIGNANT NEOPLASM OF ANTERIOR WALL OF BLADDER: ICD-10-CM

## 2025-06-10 DIAGNOSIS — C67.3 MALIGNANT NEOPLASM OF ANTERIOR WALL OF URINARY BLADDER: Primary | ICD-10-CM

## 2025-06-10 LAB
BACTERIA UR QL AUTO: ABNORMAL /HPF
BILIRUB UR QL STRIP: NEGATIVE
CLARITY UR: CLEAR
COLOR UR: YELLOW
GLUCOSE UR STRIP-MCNC: NEGATIVE MG/DL
HGB UR QL STRIP.AUTO: ABNORMAL
HYALINE CASTS UR QL AUTO: ABNORMAL /LPF
KETONES UR QL STRIP: ABNORMAL
LEUKOCYTE ESTERASE UR QL STRIP.AUTO: ABNORMAL
NITRITE UR QL STRIP: NEGATIVE
PH UR STRIP.AUTO: 5.5 [PH] (ref 5–8)
PROT UR QL STRIP: ABNORMAL
RBC # UR STRIP: ABNORMAL /HPF
REF LAB TEST METHOD: ABNORMAL
SP GR UR STRIP: 1.02 (ref 1–1.03)
SQUAMOUS #/AREA URNS HPF: ABNORMAL /HPF
UROBILINOGEN UR QL STRIP: ABNORMAL
WBC # UR STRIP: ABNORMAL /HPF

## 2025-06-10 PROCEDURE — 25010000002 BCG PER INSTILLATION: Performed by: UROLOGY

## 2025-06-10 PROCEDURE — 81001 URINALYSIS AUTO W/SCOPE: CPT

## 2025-06-10 RX ADMIN — SODIUM CHLORIDE 50 MG: 9 INJECTION, SOLUTION INTRAMUSCULAR; INTRAVENOUS; SUBCUTANEOUS at 09:45

## 2025-06-10 NOTE — PROGRESS NOTES
OP ONCOLOGY - BCG DOSE TRACKING - PHARMACY NOTE:     Riley Foote  1940 - 84 y.o.  [address] [address]  MRN: [id number]        To inquire about specific dates and times of doses given, please contact pharmacist at [phone], or [phone] (inside line) between the hours of 7 AM and 5 PM Monday through Friday. Outside of these hours, please call [phone].       **FULL DOSE** means full vial of Philippe BCG (50 mg). Partial doses will be expressed as percentages of the full dose (ex. 50% = one-half vial).            BCG INDUCTION  Day 1: Patient received FULL DOSE (50 mg) on 05/06/25  Day 8: Patient received FULL DOSE (50 mg) on 06/03/25  Day 15: Patient received FULL DOSE (50 mg) on 06/10/25      Electronically signed 06/10/2025 09:20 CDT by:  Kvng GrayD  Hematology & Oncology Specialty Pharmacist  [facility]  Phone: [phone]

## 2025-06-17 ENCOUNTER — INFUSION (OUTPATIENT)
Dept: ONCOLOGY | Facility: HOSPITAL | Age: 85
End: 2025-06-17
Payer: MEDICARE

## 2025-06-17 ENCOUNTER — LAB (OUTPATIENT)
Dept: LAB | Facility: HOSPITAL | Age: 85
End: 2025-06-17
Payer: MEDICARE

## 2025-06-17 VITALS
HEIGHT: 69 IN | RESPIRATION RATE: 20 BRPM | OXYGEN SATURATION: 96 % | BODY MASS INDEX: 26.19 KG/M2 | WEIGHT: 176.8 LBS | SYSTOLIC BLOOD PRESSURE: 126 MMHG | DIASTOLIC BLOOD PRESSURE: 76 MMHG | HEART RATE: 56 BPM | TEMPERATURE: 97.5 F

## 2025-06-17 DIAGNOSIS — C67.3 MALIGNANT NEOPLASM OF ANTERIOR WALL OF URINARY BLADDER: Primary | ICD-10-CM

## 2025-06-17 DIAGNOSIS — C67.3 MALIGNANT NEOPLASM OF ANTERIOR WALL OF BLADDER: ICD-10-CM

## 2025-06-17 LAB
BACTERIA UR QL AUTO: ABNORMAL /HPF
BILIRUB UR QL STRIP: NEGATIVE
CLARITY UR: ABNORMAL
COLOR UR: YELLOW
GLUCOSE UR STRIP-MCNC: NEGATIVE MG/DL
HGB UR QL STRIP.AUTO: ABNORMAL
HYALINE CASTS UR QL AUTO: ABNORMAL /LPF
KETONES UR QL STRIP: ABNORMAL
LEUKOCYTE ESTERASE UR QL STRIP.AUTO: ABNORMAL
NITRITE UR QL STRIP: NEGATIVE
PH UR STRIP.AUTO: 5.5 [PH] (ref 5–8)
PROT UR QL STRIP: ABNORMAL
RBC # UR STRIP: ABNORMAL /HPF
REF LAB TEST METHOD: ABNORMAL
SP GR UR STRIP: 1.02 (ref 1–1.03)
SQUAMOUS #/AREA URNS HPF: ABNORMAL /HPF
UROBILINOGEN UR QL STRIP: ABNORMAL
WBC # UR STRIP: ABNORMAL /HPF

## 2025-06-17 PROCEDURE — 25010000002 BCG PER INSTILLATION: Performed by: UROLOGY

## 2025-06-17 PROCEDURE — 81001 URINALYSIS AUTO W/SCOPE: CPT

## 2025-06-17 RX ADMIN — SODIUM CHLORIDE 50 MG: 9 INJECTION, SOLUTION INTRAMUSCULAR; INTRAVENOUS; SUBCUTANEOUS at 10:03

## 2025-06-18 NOTE — PROGRESS NOTES
OP ONCOLOGY - BCG DOSE TRACKING - PHARMACY NOTE:     Riley Foote  1940 - 84 y.o.  [address] [address]  MRN: [id number]        To inquire about specific dates and times of doses given, please contact pharmacist at [phone], or [phone] (inside line) between the hours of 7 AM and 5 PM Monday through Friday. Outside of these hours, please call [phone].       **FULL DOSE** means full vial of Philippe BCG (50 mg). Partial doses will be expressed as percentages of the full dose (ex. 50% = one-half vial).            BCG INDUCTION  Day 1: Patient received FULL DOSE (50 mg) on 05/06/25  Day 8: Patient received FULL DOSE (50 mg) on 06/03/25  Day 15: Patient received FULL DOSE (50 mg) on 06/10/25  Day 22: Patient received FULL DOSE (50 mg) on 06/17/25    Jim Holloway, PharmD  06/18/25

## 2025-06-24 ENCOUNTER — INFUSION (OUTPATIENT)
Dept: ONCOLOGY | Facility: HOSPITAL | Age: 85
End: 2025-06-24
Payer: MEDICARE

## 2025-06-24 ENCOUNTER — LAB (OUTPATIENT)
Dept: LAB | Facility: HOSPITAL | Age: 85
End: 2025-06-24
Payer: MEDICARE

## 2025-06-24 VITALS
TEMPERATURE: 97.3 F | HEART RATE: 60 BPM | BODY MASS INDEX: 26.07 KG/M2 | OXYGEN SATURATION: 98 % | WEIGHT: 176 LBS | DIASTOLIC BLOOD PRESSURE: 54 MMHG | HEIGHT: 69 IN | RESPIRATION RATE: 18 BRPM | SYSTOLIC BLOOD PRESSURE: 116 MMHG

## 2025-06-24 DIAGNOSIS — C67.3 MALIGNANT NEOPLASM OF ANTERIOR WALL OF BLADDER: Primary | ICD-10-CM

## 2025-06-24 DIAGNOSIS — C67.3 MALIGNANT NEOPLASM OF ANTERIOR WALL OF BLADDER: ICD-10-CM

## 2025-06-24 DIAGNOSIS — C67.3 MALIGNANT NEOPLASM OF ANTERIOR WALL OF URINARY BLADDER: Primary | ICD-10-CM

## 2025-06-24 PROCEDURE — 25010000002 BCG PER INSTILLATION: Performed by: UROLOGY

## 2025-06-24 PROCEDURE — 81001 URINALYSIS AUTO W/SCOPE: CPT

## 2025-06-24 RX ADMIN — SODIUM CHLORIDE 50 MG: 9 INJECTION, SOLUTION INTRAMUSCULAR; INTRAVENOUS; SUBCUTANEOUS at 10:18

## 2025-06-24 NOTE — PROGRESS NOTES
OP ONCOLOGY - BCG DOSE TRACKING - PHARMACY NOTE:     Riley Foote  1940 - 84 y.o.  [address] [address]  MRN: [id number]        To inquire about specific dates and times of doses given, please contact pharmacist at [phone], or [phone] (inside line) between the hours of 7 AM and 5 PM Monday through Friday. Outside of these hours, please call [phone].       **FULL DOSE** means full vial of Philippe BCG (50 mg). Partial doses will be expressed as percentages of the full dose (ex. 50% = one-half vial).            BCG INDUCTION  Day 1: Patient received FULL DOSE (50 mg) on 05/06/25  Day 8: Patient received FULL DOSE (50 mg) on 06/03/25  Day 15: Patient received FULL DOSE (50 mg) on 06/10/25  Day 22: Patient received FULL DOSE (50 mg) on 06/17/25  Day 29: Patient received FULL DOSE (50 mg) on 6/24/25          Kamaljit Lyman, PharmD  06/24/25  14:35 CDT

## 2025-06-25 RX ORDER — SACUBITRIL AND VALSARTAN 24; 26 MG/1; MG/1
1 TABLET, FILM COATED ORAL 2 TIMES DAILY
Qty: 180 TABLET | Refills: 4 | Status: SHIPPED | OUTPATIENT
Start: 2025-06-25

## 2025-07-01 ENCOUNTER — INFUSION (OUTPATIENT)
Dept: ONCOLOGY | Facility: HOSPITAL | Age: 85
End: 2025-07-01
Payer: MEDICARE

## 2025-07-01 ENCOUNTER — LAB (OUTPATIENT)
Dept: LAB | Facility: HOSPITAL | Age: 85
End: 2025-07-01
Payer: MEDICARE

## 2025-07-01 VITALS
HEART RATE: 75 BPM | HEIGHT: 69 IN | SYSTOLIC BLOOD PRESSURE: 133 MMHG | OXYGEN SATURATION: 96 % | TEMPERATURE: 96.4 F | RESPIRATION RATE: 8 BRPM | DIASTOLIC BLOOD PRESSURE: 76 MMHG | BODY MASS INDEX: 26.07 KG/M2 | WEIGHT: 176 LBS

## 2025-07-01 DIAGNOSIS — C67.3 MALIGNANT NEOPLASM OF ANTERIOR WALL OF BLADDER: ICD-10-CM

## 2025-07-01 DIAGNOSIS — C67.3 MALIGNANT NEOPLASM OF ANTERIOR WALL OF URINARY BLADDER: Primary | ICD-10-CM

## 2025-07-01 LAB
BACTERIA UR QL AUTO: ABNORMAL /HPF
BILIRUB UR QL STRIP: NEGATIVE
CLARITY UR: ABNORMAL
COLOR UR: YELLOW
GLUCOSE UR STRIP-MCNC: NEGATIVE MG/DL
HGB UR QL STRIP.AUTO: ABNORMAL
HYALINE CASTS UR QL AUTO: ABNORMAL /LPF
KETONES UR QL STRIP: NEGATIVE
LEUKOCYTE ESTERASE UR QL STRIP.AUTO: ABNORMAL
NITRITE UR QL STRIP: NEGATIVE
PH UR STRIP.AUTO: 5.5 [PH] (ref 5–8)
PROT UR QL STRIP: ABNORMAL
RBC # UR STRIP: ABNORMAL /HPF
REF LAB TEST METHOD: ABNORMAL
SP GR UR STRIP: 1.02 (ref 1–1.03)
SQUAMOUS #/AREA URNS HPF: ABNORMAL /HPF
UROBILINOGEN UR QL STRIP: ABNORMAL
WBC # UR STRIP: ABNORMAL /HPF

## 2025-07-01 PROCEDURE — 81001 URINALYSIS AUTO W/SCOPE: CPT

## 2025-07-01 PROCEDURE — 25010000002 BCG PER INSTILLATION: Performed by: UROLOGY

## 2025-07-01 RX ADMIN — SODIUM CHLORIDE 50 MG: 9 INJECTION, SOLUTION INTRAMUSCULAR; INTRAVENOUS; SUBCUTANEOUS at 09:50

## 2025-07-01 NOTE — PROGRESS NOTES
OP ONCOLOGY - BCG DOSE TRACKING - PHARMACY NOTE:     Riley ZOHRA Foote  1940 - 84 y.o.  [address] [address]  MRN: [id number]        To inquire about specific dates and times of doses given, please contact pharmacist at [phone], or [phone] (inside line) between the hours of 7 AM and 5 PM Monday through Friday. Outside of these hours, please call [phone].       **FULL DOSE** means full vial of Philippe BCG (50 mg). Partial doses will be expressed as percentages of the full dose (ex. 50% = one-half vial).            BCG INDUCTION  Day 1: Patient received FULL DOSE (50 mg) on 05/06/25  Day 8: Patient received FULL DOSE (50 mg) on 06/03/25  Day 15: Patient received FULL DOSE (50 mg) on 06/10/25  Day 22: Patient received FULL DOSE (50 mg) on 06/17/25  Day 29: Patient received FULL DOSE (50 mg) on 6/24/25  Day 36: Patient received FULL DOSE (50 mg) on 7/1/25         Kamaljit Lyman, PharmD  07/01/25  15:13 CDT

## 2025-07-02 RX ORDER — AZELASTINE HYDROCHLORIDE, FLUTICASONE PROPIONATE 137; 50 UG/1; UG/1
SPRAY, METERED NASAL
Qty: 23 G | Refills: 1 | Status: SHIPPED | OUTPATIENT
Start: 2025-07-02

## 2025-08-04 DIAGNOSIS — J32.9 SINUSITIS, UNSPECIFIED CHRONICITY, UNSPECIFIED LOCATION: Primary | ICD-10-CM

## 2025-08-13 ENCOUNTER — PROCEDURE VISIT (OUTPATIENT)
Dept: UROLOGY | Facility: CLINIC | Age: 85
End: 2025-08-13
Payer: MEDICARE

## 2025-08-13 DIAGNOSIS — C67.3 MALIGNANT NEOPLASM OF ANTERIOR WALL OF BLADDER: Primary | ICD-10-CM

## 2025-08-13 DIAGNOSIS — R82.71 BACTERIA IN URINE: ICD-10-CM

## 2025-08-13 LAB
BILIRUB BLD-MCNC: ABNORMAL MG/DL
CLARITY, POC: ABNORMAL
COLOR UR: ABNORMAL
GLUCOSE UR STRIP-MCNC: NEGATIVE MG/DL
KETONES UR QL: ABNORMAL
LEUKOCYTE EST, POC: ABNORMAL
NITRITE UR-MCNC: NEGATIVE MG/ML
PH UR: 5.5 [PH] (ref 5–8)
PROT UR STRIP-MCNC: ABNORMAL MG/DL
RBC # UR STRIP: ABNORMAL /UL
SP GR UR: 1.01 (ref 1–1.03)
UROBILINOGEN UR QL: NORMAL

## 2025-08-13 RX ORDER — CEPHALEXIN 500 MG/1
500 CAPSULE ORAL 3 TIMES DAILY
Qty: 9 CAPSULE | Refills: 0 | Status: SHIPPED | OUTPATIENT
Start: 2025-08-13 | End: 2025-08-16

## (undated) DEVICE — DRSNG SURESITE WNDW 4X4.5

## (undated) DEVICE — SOL IRR NACL 0.9PCT BT 1000ML

## (undated) DEVICE — RADIFOCUS OPTITORQUE ANGIOGRAPHIC CATHETER: Brand: OPTITORQUE

## (undated) DEVICE — PAD, DEFIB, ADULT, RADIOTRANS, PHYSIO: Brand: MEDLINE

## (undated) DEVICE — Device: Brand: MEDEX

## (undated) DEVICE — SOLIDIFIER LIQUI LOC PLUS 2000CC

## (undated) DEVICE — PINNACLE INTRODUCER SHEATH: Brand: PINNACLE

## (undated) DEVICE — GLIDESHEATH SLENDER STAINLESS STEEL KIT: Brand: GLIDESHEATH SLENDER

## (undated) DEVICE — SUP ARMBRD ART/LINE BLU

## (undated) DEVICE — CATH MONTR SWANGANZ WP 2L 7F110CM

## (undated) DEVICE — CATH F6INF PIG 145 110CM 6SH: Brand: INFINITI

## (undated) DEVICE — PK CATH CARD 30 CA/4

## (undated) DEVICE — TR BAND RADIAL ARTERY COMPRESSION DEVICE: Brand: TR BAND